# Patient Record
Sex: MALE | Race: BLACK OR AFRICAN AMERICAN | Employment: OTHER | URBAN - METROPOLITAN AREA
[De-identification: names, ages, dates, MRNs, and addresses within clinical notes are randomized per-mention and may not be internally consistent; named-entity substitution may affect disease eponyms.]

---

## 2018-03-27 ENCOUNTER — OFFICE VISIT (OUTPATIENT)
Dept: FAMILY MEDICINE CLINIC | Age: 73
End: 2018-03-27

## 2018-03-27 VITALS
WEIGHT: 157 LBS | SYSTOLIC BLOOD PRESSURE: 160 MMHG | BODY MASS INDEX: 25.23 KG/M2 | OXYGEN SATURATION: 98 % | TEMPERATURE: 98.2 F | RESPIRATION RATE: 16 BRPM | HEART RATE: 82 BPM | HEIGHT: 66 IN | DIASTOLIC BLOOD PRESSURE: 98 MMHG

## 2018-03-27 DIAGNOSIS — Z72.0 TOBACCO USE: ICD-10-CM

## 2018-03-27 DIAGNOSIS — I10 ESSENTIAL HYPERTENSION: Primary | ICD-10-CM

## 2018-03-27 DIAGNOSIS — M10.9 GOUT, UNSPECIFIED CAUSE, UNSPECIFIED CHRONICITY, UNSPECIFIED SITE: ICD-10-CM

## 2018-03-27 DIAGNOSIS — E03.9 ACQUIRED HYPOTHYROIDISM: ICD-10-CM

## 2018-03-27 DIAGNOSIS — Z11.59 ENCOUNTER FOR HEPATITIS C SCREENING TEST FOR LOW RISK PATIENT: ICD-10-CM

## 2018-03-27 RX ORDER — LISINOPRIL 10 MG/1
TABLET ORAL DAILY
COMMUNITY
End: 2019-01-22 | Stop reason: SDUPTHER

## 2018-03-27 RX ORDER — LEVOTHYROXINE SODIUM 125 UG/1
TABLET ORAL
COMMUNITY
End: 2018-04-17 | Stop reason: DRUGHIGH

## 2018-03-27 NOTE — PROGRESS NOTES
Shanita Jackson, 67 y.o.,  male    SUBJECTIVE  Establish care, switching PCP    HTN- long standing h/o, taking lisinopril, says prev PCP recently decreased lisinopril dose    He is a chronic smoker, used to be 2-3 ppd in his younger years, now about 5 cig/ day    H/o hypothyroidism, on lt4 replacement for years    H/o gout big toe flares abouts 2 times a year, takes nsaids with relief, does not know trigger. Remote h/o throat ca s/p radiation/chemotherapy 2003    Remote h/o etoh induced pancreatitis, medically managed. ROS:  See HPI, all others negative        Patient Active Problem List   Diagnosis Code    Essential hypertension I10    Acquired hypothyroidism E03.9    Tobacco use Z72.0       Current Outpatient Prescriptions   Medication Sig Dispense Refill    lisinopril (PRINIVIL, ZESTRIL) 10 mg tablet Take  by mouth daily.  levothyroxine (SYNTHROID) 125 mcg tablet Take  by mouth Daily (before breakfast). No Known Allergies    Past Medical History:   Diagnosis Date    Cancer ([facility] [address] 2012    throat    Gout     Hypertension     Pancreatitis 1989       Social History     Social History    Marital status:      Spouse name: N/A    Number of children: N/A    Years of education: N/A     Occupational History    Not on file.      Social History Main Topics    Smoking status: Current Every Day Smoker     Packs/day: 0.25     Years: 50.00    Smokeless tobacco: Never Used    Alcohol use Yes      Comment: 3    Drug use: No    Sexual activity: Yes     Partners: Female     Other Topics Concern    Not on file     Social History Narrative    No narrative on file       Family History   Problem Relation Age of Onset    Cancer Father          OBJECTIVE    Physical Exam:     Visit Vitals    BP (!) 160/98 (BP 1 Location: Left arm, BP Patient Position: Sitting)    Pulse 82    Temp 98.2 °F (36.8 °C) (Oral)    Resp 16    Ht 5' 6\" (1.676 m)    Wt 157 lb (71.2 kg)    SpO2 98%    BMI 25.34 kg/m2       General: alert, AA, in no apparent distress or pain  Head: atraumatic. Non-tender maxillary and frontal sinuses  Eyes: Lids with no discharge, no matting, conjunctivae clear and non injected, full EOMs, PERLLA  Ears: pinna non-tender, external auditory canal patent, TM intact  Mouth/throat:tonsils non enlarged, pharynx non erythematous and no lesion, nasal mucosa normal  Neck: supple, no adenopathy palpated  CVS: normal rate, regular rhythm, distinct S1 and S2  Lungs:clear to ausculation bilaterally, no crackles, wheezing or rhonchi noted  Abdomen: normoactive bowel sounds, soft, non-tender  Extremities: no edema, no cyanosis,  Skin: warm, no lesions, rashes noted  Psych:  mood and affect normal        ASSESSMENT/PLAN  Diagnoses and all orders for this visit:    1. Essential hypertension  Elevated today, on low dose lisinopril   For his age goal <667/06  -     METABOLIC PANEL, COMPREHENSIVE; Future  -     CBC WITH AUTOMATED DIFF; Future  -     LIPID PANEL; Future    2. Acquired hypothyroidism  On replacement   check-     TSH 3RD GENERATION; Future    3. Gout, unspecified cause, unspecified chronicity, unspecified site  2x a year flares  Check:  -     URIC ACID; Future    4. Tobacco use  Counseled on cessation  Plan to offer AAA screen/ LDCT lung screen if not done  Request records for vaccines    5. Encounter for hepatitis C screening test for low risk patient  -     HEPATITIS C AB; Future    Follow-up Disposition:  Return in about 3 weeks (around 4/17/2018), or if symptoms worsen or fail to improve. Patient understands plan of care. Patient has provided input and agrees with goals.

## 2018-03-27 NOTE — PATIENT INSTRUCTIONS

## 2018-03-27 NOTE — MR AVS SNAPSHOT
[facility] [address] [facility] 
[phone] Patient: Taco Allen MRN: [id number] :1945 Visit Information Date & Time Provider Department Dept. Phone Encounter #  
 3/27/2018  2:30 PM Bean Desai, [address] [place] [address] Road [id number] Follow-up Instructions Return in about 3 weeks (around 2018), or if symptoms worsen or fail to improve. Upcoming Health Maintenance Date Due Hepatitis C Screening 1945 DTaP/Tdap/Td series (1 - Tdap) 1966 FOBT Q 1 YEAR AGE 50-75 1995 ZOSTER VACCINE AGE 60> 2005 GLAUCOMA SCREENING Q2Y 2010 Pneumococcal 65+ Low/Medium Risk (1 of 2 - PCV13) 2010 Influenza Age 5 to Adult 2017 Allergies as of 3/27/2018  Review Complete On: 3/27/2018 By: Bean Desai MD  
 No Known Allergies Current Immunizations  Never Reviewed No immunizations on file. Not reviewed this visit You Were Diagnosed With   
  
 Codes Comments Essential hypertension    -  Primary ICD-10-CM: I10 
ICD-9-CM: 401.9 Acquired hypothyroidism     ICD-10-CM: E03.9 ICD-9-CM: 244.9 Gout, unspecified cause, unspecified chronicity, unspecified site     ICD-10-CM: M10.9 ICD-9-CM: 274.9 Tobacco use     ICD-10-CM: Z72.0 ICD-9-CM: 305.1 Encounter for hepatitis C screening test for low risk patient     ICD-10-CM: Z11.59 
ICD-9-CM: V73.89 Vitals BP Pulse Temp Resp Height(growth percentile) Weight(growth percentile) (!) 160/98 (BP 1 Location: Left arm, BP Patient Position: Sitting) 82 98.2 °F (36.8 °C) (Oral) 16 5' 6\" (1.676 m) 157 lb (71.2 kg) SpO2 BMI Smoking Status 98% 25.34 kg/m2 Current Every Day Smoker Vitals History BMI and BSA Data Body Mass Index Body Surface Area  
 25.34 kg/m 2 1.82 m 2 Preferred Pharmacy Pharmacy Name Phone [address], [address] [facility] Your Updated Medication List  
  
   
This list is accurate as of 3/27/18  3:34 PM.  Always use your most recent med list.  
  
  
  
  
 levothyroxine 125 mcg tablet Commonly known as:  SYNTHROID Take  by mouth Daily (before breakfast). lisinopril 10 mg tablet Commonly known as:  Sae Art Take  by mouth daily. Follow-up Instructions Return in about 3 weeks (around 4/17/2018), or if symptoms worsen or fail to improve. To-Do List   
 03/27/2018 Lab:  CBC WITH AUTOMATED DIFF   
  
 03/27/2018 Lab:  HEPATITIS C AB   
  
 03/27/2018 Lab:  LIPID PANEL   
  
 03/27/2018 Lab:  METABOLIC PANEL, COMPREHENSIVE   
  
 03/27/2018 Lab:  TSH 3RD GENERATION   
  
 03/27/2018 Lab:  URIC ACID Patient Instructions DASH Diet: Care Instructions Your Care Instructions The DASH diet is an eating plan that can help lower your blood pressure. DASH stands for Dietary Approaches to Stop Hypertension. Hypertension is high blood pressure. The DASH diet focuses on eating foods that are high in calcium, potassium, and magnesium. These nutrients can lower blood pressure. The foods that are highest in these nutrients are fruits, vegetables, low-fat dairy products, nuts, seeds, and legumes. But taking calcium, potassium, and magnesium supplements instead of eating foods that are high in those nutrients does not have the same effect. The DASH diet also includes whole grains, fish, and poultry. The DASH diet is one of several lifestyle changes your doctor may recommend to lower your high blood pressure. Your doctor may also want you to decrease the amount of sodium in your diet. Lowering sodium while following the DASH diet can lower blood pressure even further than just the DASH diet alone. Follow-up care is a key part of your treatment and safety.  Be sure to make and go to all appointments, and call your doctor if you are having problems. It's also a good idea to know your test results and keep a list of the medicines you take. How can you care for yourself at home? Following the DASH diet · Eat 4 to 5 servings of fruit each day. A serving is 1 medium-sized piece of fruit, ½ cup chopped or canned fruit, 1/4 cup dried fruit, or 4 ounces (½ cup) of fruit juice. Choose fruit more often than fruit juice. · Eat 4 to 5 servings of vegetables each day. A serving is 1 cup of lettuce or raw leafy vegetables, ½ cup of chopped or cooked vegetables, or 4 ounces (½ cup) of vegetable juice. Choose vegetables more often than vegetable juice. · Get 2 to 3 servings of low-fat and fat-free dairy each day. A serving is 8 ounces of milk, 1 cup of yogurt, or 1 ½ ounces of cheese. · Eat 6 to 8 servings of grains each day. A serving is 1 slice of bread, 1 ounce of dry cereal, or ½ cup of cooked rice, pasta, or cooked cereal. Try to choose whole-grain products as much as possible. · Limit lean meat, poultry, and fish to 2 servings each day. A serving is 3 ounces, about the size of a deck of cards. · Eat 4 to 5 servings of nuts, seeds, and legumes (cooked dried beans, lentils, and split peas) each week. A serving is 1/3 cup of nuts, 2 tablespoons of seeds, or ½ cup of cooked beans or peas. · Limit fats and oils to 2 to 3 servings each day. A serving is 1 teaspoon of vegetable oil or 2 tablespoons of salad dressing. · Limit sweets and added sugars to 5 servings or less a week. A serving is 1 tablespoon jelly or jam, ½ cup sorbet, or 1 cup of lemonade. · Eat less than 2,300 milligrams (mg) of sodium a day. If you limit your sodium to 1,500 mg a day, you can lower your blood pressure even more. Tips for success · Start small. Do not try to make dramatic changes to your diet all at once.  You might feel that you are missing out on your favorite foods and then be more likely to not follow the plan. Make small changes, and stick with them. Once those changes become habit, add a few more changes. · Try some of the following: ¨ Make it a goal to eat a fruit or vegetable at every meal and at snacks. This will make it easy to get the recommended amount of fruits and vegetables each day. ¨ Try yogurt topped with fruit and nuts for a snack or healthy dessert. ¨ Add lettuce, tomato, cucumber, and onion to sandwiches. ¨ Combine a ready-made pizza crust with low-fat mozzarella cheese and lots of vegetable toppings. Try using tomatoes, squash, spinach, broccoli, carrots, cauliflower, and onions. ¨ Have a variety of cut-up vegetables with a low-fat dip as an appetizer instead of chips and dip. ¨ Sprinkle sunflower seeds or chopped almonds over salads. Or try adding chopped walnuts or almonds to cooked vegetables. ¨ Try some vegetarian meals using beans and peas. Add garbanzo or kidney beans to salads. Make burritos and tacos with mashed rodriguez beans or black beans. Where can you learn more? Go to http://mike-marcos.info/. Enter Y180 in the search box to learn more about \"DASH Diet: Care Instructions. \" Current as of: September 21, 2016 Content Version: 11.4 © 9488-2433 [employer]. Care instructions adapted under license by [employer] (which disclaims liability or warranty for this information). If you have questions about a medical condition or this instruction, always ask your healthcare professional. [place] [address] any warranty or liability for your use of this information. Introducing [facility] [place] [facility]! [facility] introduces [employer] patient portal. Now you can access parts of your medical record, email your doctor's office, and request medication refills online. 1. In your internet browser, go to [email or web address] 2. Click on the First Time User? Click Here link in the Sign In box. You will see the New Member Sign Up page. 3. Enter your [employer] Access Code exactly as it appears below. You will not need to use this code after youve completed the sign-up process. If you do not sign up before the expiration date, you must request a new code. · [employer] Access Code: [email or web address] Expires: 6/25/2018  3:34 PM 
 
4. Enter the last four digits of your Social Security Number (xxxx) and Date of Birth (mm/dd/yyyy) as indicated and click Submit. You will be taken to the next sign-up page. 5. Create a CloudMade ID. This will be your CloudMade login ID and cannot be changed, so think of one that is secure and easy to remember. 6. Create a Cloud[employer] password. You can change your password at any time. 7. Enter your Password Reset Question and Answer. This can be used at a later time if you forget your password. 8. Enter your e-mail address. You will receive e-mail notification when new information is available in [address] 9. Click Sign Up. You can now view and download portions of your medical record. 10. Click the Download Summary menu link to download a portable copy of your medical information. If you have questions, please visit the Frequently Asked Questions section of the [employer] website. Remember, [employer] is NOT to be used for urgent needs. For medical emergencies, dial 911. Now available from your iPhone and Android! Please provide this summary of care documentation to your next provider. If you have any questions after today's visit, please call [phone].

## 2018-03-27 NOTE — PROGRESS NOTES
1. Have you been to the ER, urgent care clinic since your last visit? Hospitalized since your last visit? No    2. Have you seen or consulted any other health care providers outside of the [address] [place] [address] since your last visit? Include any pap smears or colon screening.  No

## 2018-04-09 ENCOUNTER — HOSPITAL ENCOUNTER (OUTPATIENT)
Dept: LAB | Age: 73
Discharge: HOME | End: 2018-04-09
Payer: MEDICARE

## 2018-04-09 LAB
ALBUMIN SERPL-MCNC: 3.3 G/DL (ref 3.4–5)
ALBUMIN/GLOB SERPL: 0.9 {RATIO} (ref 0.8–1.7)
ALP SERPL-CCNC: 105 U/L (ref 45–117)
ALT SERPL-CCNC: 16 U/L (ref 16–61)
ANION GAP SERPL CALC-SCNC: 7 MMOL/L (ref 3–18)
AST SERPL-CCNC: 16 U/L (ref 15–37)
BASOPHILS # BLD: 0 K/UL (ref 0–0.06)
BASOPHILS NFR BLD: 0 % (ref 0–2)
BILIRUB SERPL-MCNC: 0.4 MG/DL (ref 0.2–1)
BUN SERPL-MCNC: 15 MG/DL (ref 7–18)
BUN/CREAT SERPL: 10 (ref 12–20)
CALCIUM SERPL-MCNC: 8.9 MG/DL (ref 8.5–10.1)
CHLORIDE SERPL-SCNC: 106 MMOL/L (ref 100–108)
CHOLEST SERPL-MCNC: 184 MG/DL
CO2 SERPL-SCNC: 28 MMOL/L (ref 21–32)
CREAT SERPL-MCNC: 1.48 MG/DL (ref 0.6–1.3)
DIFFERENTIAL METHOD BLD: ABNORMAL
EOSINOPHIL # BLD: 0.4 K/UL (ref 0–0.4)
EOSINOPHIL NFR BLD: 8 % (ref 0–5)
ERYTHROCYTE [DISTWIDTH] IN BLOOD BY AUTOMATED COUNT: 13.5 % (ref 11.6–14.5)
GLOBULIN SER CALC-MCNC: 3.7 G/DL (ref 2–4)
GLUCOSE SERPL-MCNC: 99 MG/DL (ref 74–99)
HCT VFR BLD AUTO: 47 % (ref 36–48)
HCV AB SER IA-ACNC: 0.05 INDEX
HCV AB SERPL QL IA: NEGATIVE
HCV COMMENT,HCGAC: NORMAL
HDLC SERPL-MCNC: 63 MG/DL (ref 40–60)
HDLC SERPL: 2.9 {RATIO} (ref 0–5)
HGB BLD-MCNC: 16 G/DL (ref 13–16)
LDLC SERPL CALC-MCNC: 101.4 MG/DL (ref 0–100)
LIPID PROFILE,FLP: ABNORMAL
LYMPHOCYTES # BLD: 2 K/UL (ref 0.9–3.6)
LYMPHOCYTES NFR BLD: 40 % (ref 21–52)
MCH RBC QN AUTO: 29.6 PG (ref 24–34)
MCHC RBC AUTO-ENTMCNC: 34 G/DL (ref 31–37)
MCV RBC AUTO: 86.9 FL (ref 74–97)
MONOCYTES # BLD: 0.5 K/UL (ref 0.05–1.2)
MONOCYTES NFR BLD: 10 % (ref 3–10)
NEUTS SEG # BLD: 2 K/UL (ref 1.8–8)
NEUTS SEG NFR BLD: 42 % (ref 40–73)
PLATELET # BLD AUTO: 267 K/UL (ref 135–420)
PMV BLD AUTO: 10.3 FL (ref 9.2–11.8)
POTASSIUM SERPL-SCNC: 4.5 MMOL/L (ref 3.5–5.5)
PROT SERPL-MCNC: 7 G/DL (ref 6.4–8.2)
RBC # BLD AUTO: 5.41 M/UL (ref 4.7–5.5)
SODIUM SERPL-SCNC: 141 MMOL/L (ref 136–145)
TRIGL SERPL-MCNC: 98 MG/DL (ref ?–150)
TSH SERPL DL<=0.05 MIU/L-ACNC: 6.37 UIU/ML (ref 0.36–3.74)
URATE SERPL-MCNC: 6.7 MG/DL (ref 2.6–7.2)
VLDLC SERPL CALC-MCNC: 19.6 MG/DL
WBC # BLD AUTO: 5 K/UL (ref 4.6–13.2)

## 2018-04-09 PROCEDURE — 84550 ASSAY OF BLOOD/URIC ACID: CPT | Performed by: FAMILY MEDICINE

## 2018-04-09 PROCEDURE — 36415 COLL VENOUS BLD VENIPUNCTURE: CPT | Performed by: FAMILY MEDICINE

## 2018-04-09 PROCEDURE — 84443 ASSAY THYROID STIM HORMONE: CPT | Performed by: FAMILY MEDICINE

## 2018-04-09 PROCEDURE — 86803 HEPATITIS C AB TEST: CPT | Performed by: FAMILY MEDICINE

## 2018-04-09 PROCEDURE — 80061 LIPID PANEL: CPT | Performed by: FAMILY MEDICINE

## 2018-04-09 PROCEDURE — 85025 COMPLETE CBC W/AUTO DIFF WBC: CPT | Performed by: FAMILY MEDICINE

## 2018-04-09 PROCEDURE — 80053 COMPREHEN METABOLIC PANEL: CPT | Performed by: FAMILY MEDICINE

## 2018-04-17 ENCOUNTER — OFFICE VISIT (OUTPATIENT)
Dept: FAMILY MEDICINE CLINIC | Age: 73
End: 2018-04-17

## 2018-04-17 VITALS
RESPIRATION RATE: 16 BRPM | WEIGHT: 159 LBS | TEMPERATURE: 97.9 F | HEIGHT: 66 IN | SYSTOLIC BLOOD PRESSURE: 136 MMHG | DIASTOLIC BLOOD PRESSURE: 82 MMHG | BODY MASS INDEX: 25.55 KG/M2 | HEART RATE: 54 BPM | OXYGEN SATURATION: 98 %

## 2018-04-17 DIAGNOSIS — I10 ESSENTIAL HYPERTENSION: Primary | ICD-10-CM

## 2018-04-17 DIAGNOSIS — Z13.6 ENCOUNTER FOR ABDOMINAL AORTIC ANEURYSM (AAA) SCREENING: ICD-10-CM

## 2018-04-17 DIAGNOSIS — E03.9 ACQUIRED HYPOTHYROIDISM: ICD-10-CM

## 2018-04-17 DIAGNOSIS — Z72.0 TOBACCO USE: ICD-10-CM

## 2018-04-17 DIAGNOSIS — N18.30 CKD (CHRONIC KIDNEY DISEASE) STAGE 3, GFR 30-59 ML/MIN (HCC): ICD-10-CM

## 2018-04-17 DIAGNOSIS — Z71.89 ADVANCE CARE PLANNING: ICD-10-CM

## 2018-04-17 DIAGNOSIS — Z13.5 GLAUCOMA SCREENING: ICD-10-CM

## 2018-04-17 PROBLEM — E89.0 POSTABLATIVE HYPOTHYROIDISM: Status: RESOLVED | Noted: 2018-04-17 | Resolved: 2018-04-17

## 2018-04-17 PROBLEM — E89.0 POSTABLATIVE HYPOTHYROIDISM: Status: ACTIVE | Noted: 2018-04-17

## 2018-04-17 RX ORDER — LOVASTATIN 20 MG/1
40 TABLET ORAL
Qty: 180 TAB | Refills: 0 | Status: SHIPPED | OUTPATIENT
Start: 2018-04-17 | End: 2019-01-22 | Stop reason: SDUPTHER

## 2018-04-17 RX ORDER — LEVOTHYROXINE SODIUM 137 UG/1
137 TABLET ORAL
Qty: 60 TAB | Refills: 0 | Status: SHIPPED | OUTPATIENT
Start: 2018-04-17 | End: 2018-06-05 | Stop reason: DRUGHIGH

## 2018-04-17 NOTE — PROGRESS NOTES
1. Have you been to the ER, urgent care clinic since your last visit? Hospitalized since your last visit? No    2. Have you seen or consulted any other health care providers outside of the [address] [place] [address] since your last visit? Include any pap smears or colon screening.  No

## 2018-04-17 NOTE — ACP (ADVANCE CARE PLANNING)
Advance Care Planning (ACP) Provider Conversation Snapshot    Date of ACP Conversation: 04/17/18  Persons included in Conversation:  patient  Length of ACP Conversation in minutes:  16 minutes    Authorized Decision Maker (if patient is incapable of making informed decisions):    This person is:   Has yet to decide        For Patients with Decision Making Capacity:   Values/Goals: Exploration of values, goals, and preferences if recovery is not expected, even with continued medical treatment in the event of:  Imminent death  Severe, permanent brain injury    Conversation Outcomes / Follow-Up Plan:   Recommended completion of Advance Directive form after review of ACP materials and conversation with prospective healthcare agent   Recommended communicating the plan and making copies for the healthcare agent, personal physician, and others as appropriate (e.g., health system)  Recommended review of completed ACP document annually or upon change in health status

## 2018-04-17 NOTE — MR AVS SNAPSHOT
[address] [address] [facility] 
[phone] Patient: Cami Gonzalez MRN: [id number] :1945 Visit Information Date & Time Provider Department Dept. Phone Encounter #  
 2018 11:30 AM Megan Shin, [address] [place] [address] [id number] Follow-up Instructions Return in about 3 months (around 2018), or if symptoms worsen or fail to improve. Upcoming Health Maintenance Date Due DTaP/Tdap/Td series (1 - Tdap) 1966 FOBT Q 1 YEAR AGE 50-75 1995 ZOSTER VACCINE AGE 60> 2005 GLAUCOMA SCREENING Q2Y 2010 Influenza Age 5 to Adult 2017 MEDICARE YEARLY EXAM 3/27/2018 Allergies as of 2018  Review Complete On: 2018 By: Megan Shin MD  
 No Known Allergies Current Immunizations  Reviewed on 2018 Name Date Pneumococcal Conjugate (PCV-13) 10/21/2016 Pneumococcal Polysaccharide (PPSV-23) 2014 Not reviewed this visit You Were Diagnosed With   
  
 Codes Comments Essential hypertension    -  Primary ICD-10-CM: I10 
ICD-9-CM: 401.9 CKD (chronic kidney disease) stage 3, GFR 30-59 ml/min     ICD-10-CM: N18.3 ICD-9-CM: 342. 3 Acquired hypothyroidism     ICD-10-CM: E03.9 ICD-9-CM: 244.9 Tobacco use     ICD-10-CM: Z72.0 ICD-9-CM: 305.1 Encounter for abdominal aortic aneurysm (AAA) screening     ICD-10-CM: Z13.6 ICD-9-CM: V81.2 Glaucoma screening     ICD-10-CM: Z13.5 ICD-9-CM: V80.1 Vitals BP Pulse Temp Resp Height(growth percentile) Weight(growth percentile) 136/82 (BP 1 Location: Left arm, BP Patient Position: Sitting) (!) 54 97.9 °F (36.6 °C) (Oral) 16 5' 6\" (1.676 m) 159 lb (72.1 kg) SpO2 BMI Smoking Status 98% 25.66 kg/m2 Current Every Day Smoker Vitals History BMI and BSA Data  Body Mass Index Body Surface Area  
 25.66 kg/m 2 1.83 m 2  
 Preferred Pharmacy Pharmacy Name Phone [address], [address] [facility] Your Updated Medication List  
  
   
This list is accurate as of 4/17/18 12:21 PM.  Always use your most recent med list.  
  
  
  
  
 levothyroxine 137 mcg tablet Commonly known as:  SYNTHROID Take 137 mcg by mouth Daily (before breakfast). lisinopril 10 mg tablet Commonly known as:  Mechele Livings Take  by mouth daily. lovastatin 20 mg tablet Commonly known as:  MEVACOR Take 2 Tabs by mouth nightly. Prescriptions Sent to Pharmacy Refills  
 levothyroxine (SYNTHROID) 137 mcg tablet 0 Sig: Take 137 mcg by mouth Daily (before breakfast). Class: Normal  
 [facility] [address] [place] Ph #: [phone] Route: Oral  
 lovastatin (MEVACOR) 20 mg tablet 0 Sig: Take 2 Tabs by mouth nightly. Class: Normal  
 [facility] [address] Ph #: [phone] Route: Oral  
  
We Performed the Following REFERRAL TO OPHTHALMOLOGY [REF57 Custom] Follow-up Instructions Return in about 3 months (around 7/17/2018), or if symptoms worsen or fail to improve. To-Do List   
 04/17/2018 Lab:  LIPID PANEL   
  
 04/17/2018 Lab:  METABOLIC PANEL, COMPREHENSIVE   
  
 04/17/2018 Lab:  TSH 3RD GENERATION   
  
 04/17/2018 Imaging:  US EXAM SCREENING AAA Referral Information Referral ID Referred By Referred To  
  
 [id number] Elsi Swartz MD   
   [address] [place] [address] Phone: [phone] Fax: [phone] Visits Status Start Date End Date 1 New Request 4/17/18 4/17/19 If your referral has a status of pending review or denied, additional information will be sent to support the outcome of this decision. Patient Instructions The best way to stay healthy is to live a healthy lifestyle. A healthy lifestyle includes regular exercise, eating a well-balanced diet, keeping a healthy weight and not smoking. Regular physical exams and screening tests are another important way to take care of yourself. Preventive exams provided by health care providers can find health problems early when treatment works best and can keep you from getting certain diseases or illnesses. Preventive services include exams, lab tests, screenings, shots, monitoring and information to help you take care of your own health. All people over 65 should have a pneumonia shot. Pneumonia shots are usually only needed once in a lifetime unless your doctor decides differently. In addition to your physical exam, some screening tests are recommended: 
 
All people over 65 should have a yearly flu shot. People over 65 are at medium to high risk for Hepatitis B. Three shots are needed for complete protection. Bone mass measurement (dexa scan) is recommended every two years. Diabetes Mellitus screening is recommended every year. Glaucoma is an eye disease caused by high pressure in the eye. An eye exam is recommended every year. Cardiovascular screening tests that check your cholesterol and other blood fat (lipid) levels are recommended every five years. Colorectal Cancer screening tests help to find pre-cancerous polyps (growths in the colon) so they can be removed before they turn into cancer. Tests ordered for screening depend on your personal and family history risk factors. Prostate Cancer Screening (annually up to age 76) Screening for breast cancer is recommended yearly with a Mammogram. 
 
Screening for cervical and vaginal cancer is recommended with a pelvic and Pap test every two years.  However if you have had an abnormal pap in the past  three years or at high risk for cervical or vaginal cancer Medicare will cover a pap test and a pelvic exam every year. Here is a list of your current Health Maintenance items with a due date: 
Health Maintenance Due Topic Date Due  
 DTaP/Tdap/Td  (1 - Tdap) 11/08/1966  Stool testing for trace blood  11/08/1995  Shingles Vaccine  09/08/2005  Glaucoma Screening   11/08/2010  Flu Vaccine  08/01/2017 Lisa Onofre Annual Well Visit  03/27/2018 Introducing [facility] [place] [facility]! [facility] introduces [employer] patient portal. Now you can access parts of your medical record, email your doctor's office, and request medication refills online. 1. In your internet browser, go to [email or web address] 2. Click on the First Time User? Click Here link in the Sign In box. You will see the New Member Sign Up page. 3. Enter your [employer] Access Code exactly as it appears below. You will not need to use this code after youve completed the sign-up process. If you do not sign up before the expiration date, you must request a new code. · [employer] Access Code: [email or web address] Expires: 6/25/2018  3:34 PM 
 
4. Enter the last four digits of your Social Security Number (xxxx) and Date of Birth (mm/dd/yyyy) as indicated and click Submit. You will be taken to the next sign-up page. 5. Create a [employer] ID. This will be your [employer] login ID and cannot be changed, so think of one that is secure and easy to remember. 6. Create a [employer] password. You can change your password at any time. 7. Enter your Password Reset Question and Answer. This can be used at a later time if you forget your password. 8. Enter your e-mail address. You will receive e-mail notification when new information is available in [address] 9. Click Sign Up. You can now view and download portions of your medical record. 10. Click the Download Summary menu link to download a portable copy of your medical information.  
 
If you have questions, please visit the Frequently Asked Questions section of the [employer]. Remember, [employer] is NOT to be used for urgent needs. For medical emergencies, dial 911. Now available from your iPhone and Android! Please provide this summary of care documentation to your next provider. Your primary care clinician is listed as Daniel Traore. If you have any questions after today's visit, please call [phone].

## 2018-04-17 NOTE — PROGRESS NOTES
This is the Subsequent Medicare Annual Wellness Exam, performed 12 months or more after the Initial AWV or the last Subsequent AWV    I have reviewed the patient's medical history in detail and updated the computerized patient record. History     Past Medical History:   Diagnosis Date    Cancer [facility]) 2012    throat    Gout     Hypertension     Pancreatitis 1989      Past Surgical History:   Procedure Laterality Date    ABDOMEN SURGERY PROC UNLISTED       Current Outpatient Prescriptions   Medication Sig Dispense Refill    levothyroxine (SYNTHROID) 137 mcg tablet Take 137 mcg by mouth Daily (before breakfast). 60 Tab 0    lovastatin (MEVACOR) 20 mg tablet Take 2 Tabs by mouth nightly. 180 Tab 0    lisinopril (PRINIVIL, ZESTRIL) 10 mg tablet Take  by mouth daily. No Known Allergies  Family History   Problem Relation Age of Onset    Cancer Father      Social History   Substance Use Topics    Smoking status: Current Every Day Smoker     Packs/day: 0.25     Years: 50.00    Smokeless tobacco: Never Used    Alcohol use Yes      Comment: 3     Patient Active Problem List   Diagnosis Code    Essential hypertension I10    Acquired hypothyroidism E03.9    Tobacco use Z72.0    CKD (chronic kidney disease) stage 3, GFR 30-59 ml/min N18.3       Depression Risk Factor Screening:     PHQ over the last two weeks 3/27/2018   Little interest or pleasure in doing things Not at all   Feeling down, depressed or hopeless Not at all   Total Score PHQ 2 0     Alcohol Risk Factor Screening: You do not drink alcohol or very rarely. Functional Ability and Level of Safety:   Hearing Loss  Hearing is good. Activities of Daily Living  The home contains: no safety equipment. Patient does total self care    Fall Risk  Fall Risk Assessment, last 12 mths 3/27/2018   Able to walk? Yes   Fall in past 12 months?  No       Abuse Screen  Patient is not abused    Cognitive Screening   Evaluation of Cognitive Function:  Has your family/caregiver stated any concerns about your memory: no      Patient Care Team   Patient Care Team:  Yenny Roger MD as PCP - General (Family Practice)    Assessment/Plan   Education and counseling provided:  Are appropriate based on today's review and evaluation  End-of-Life planning (with patient's consent)-discussed, provided form  Pneumococcal Vaccine- completed 13/23  Influenza Vaccine- annually  Cardiovascular screening blood test- utd  Screening for glaucoma- due, referral order placed  AAA screen- order placed  Colon ca screen- [place] notes requested    Iris Yanez, 67 y.o.,  male    SUBJECTIVE  Ff-up    HTN- long standing h/o, taking lisinopril. Reviewed labs, pt reports known CKD    He is a chronic smoker, used to be 2-3 ppd in his younger years, now about 5 cig/ day    H/o hypothyroidism, on lt4 replacement for years. Reviewed TSH elevated, pt reports med compliance    H/o gout big toe flares abouts 2 times a year, takes nsaids with relief, does not know trigger. Uric acid level is  normal    Remote h/o throat ca s/p radiation/chemotherapy 2003    ROS:  See HPI, all others negative        Patient Active Problem List   Diagnosis Code    Essential hypertension I10    Acquired hypothyroidism E03.9    Tobacco use Z72.0    CKD (chronic kidney disease) stage 3, GFR 30-59 ml/min N18.3       Current Outpatient Prescriptions   Medication Sig Dispense Refill    levothyroxine (SYNTHROID) 137 mcg tablet Take 137 mcg by mouth Daily (before breakfast). 60 Tab 0    lovastatin (MEVACOR) 20 mg tablet Take 2 Tabs by mouth nightly. 180 Tab 0    lisinopril (PRINIVIL, ZESTRIL) 10 mg tablet Take  by mouth daily.          No Known Allergies    Past Medical History:   Diagnosis Date    Cancer ([facility] [address] 2012    throat    Gout     Hypertension     Pancreatitis 1989       Social History     Social History    Marital status:      Spouse name: N/A    Number of children: N/A    Years of education: N/A     Occupational History    Not on file. Social History Main Topics    Smoking status: Current Every Day Smoker     Packs/day: 0.25     Years: 50.00    Smokeless tobacco: Never Used    Alcohol use Yes      Comment: 3    Drug use: No    Sexual activity: Yes     Partners: Female     Other Topics Concern    Not on file     Social History Narrative       Family History   Problem Relation Age of Onset    Cancer Father          OBJECTIVE    Physical Exam:     Visit Vitals    /82 (BP 1 Location: Left arm, BP Patient Position: Sitting)    Pulse (!) 54    Temp 97.9 °F (36.6 °C) (Oral)    Resp 16    Ht 5' 6\" (1.676 m)    Wt 159 lb (72.1 kg)    SpO2 98%    BMI 25.66 kg/m2       General: alert, AA, in no apparent distress or pain  Neck: supple, no adenopathy palpated  CVS: normal rate, regular rhythm, distinct S1 and S2  Lungs:clear to ausculation bilaterally, no crackles, wheezing or rhonchi noted  Extremities: no edema, no cyanosis,  Skin: warm, no lesions, rashes noted  Psych:  mood and affect normal    Results for orders placed or performed during the hospital encounter of 04/09/18   CBC WITH AUTOMATED DIFF   Result Value Ref Range    WBC 5.0 4.6 - 13.2 K/uL    RBC 5.41 4.70 - 5.50 M/uL    HGB 16.0 13.0 - 16.0 g/dL    HCT 47.0 36.0 - 48.0 %    MCV 86.9 74.0 - 97.0 FL    MCH 29.6 24.0 - 34.0 PG    MCHC 34.0 31.0 - 37.0 g/dL    RDW 13.5 11.6 - 14.5 %    PLATELET 875 774 - 004 K/uL    MPV 10.3 9.2 - 11.8 FL    NEUTROPHILS 42 40 - 73 %    LYMPHOCYTES 40 21 - 52 %    MONOCYTES 10 3 - 10 %    EOSINOPHILS 8 (H) 0 - 5 %    BASOPHILS 0 0 - 2 %    ABS. NEUTROPHILS 2.0 1.8 - 8.0 K/UL    ABS. LYMPHOCYTES 2.0 0.9 - 3.6 K/UL    ABS. MONOCYTES 0.5 0.05 - 1.2 K/UL    ABS. EOSINOPHILS 0.4 0.0 - 0.4 K/UL    ABS. BASOPHILS 0.0 0.0 - 0.06 K/UL    DF AUTOMATED     HEPATITIS C AB   Result Value Ref Range    Hepatitis C virus Ab 0.05 <0.80 Index    Hep C  virus Ab Interp.  NEGATIVE  NEG      Hep C  virus Ab comment         LIPID PANEL   Result Value Ref Range    LIPID PROFILE          Cholesterol, total 184 <200 MG/DL    Triglyceride 98 <150 MG/DL    HDL Cholesterol 63 (H) 40 - 60 MG/DL    LDL, calculated 101.4 (H) 0 - 100 MG/DL    VLDL, calculated 19.6 MG/DL    CHOL/HDL Ratio 2.9 0 - 5.0     METABOLIC PANEL, COMPREHENSIVE   Result Value Ref Range    Sodium 141 136 - 145 mmol/L    Potassium 4.5 3.5 - 5.5 mmol/L    Chloride 106 100 - 108 mmol/L    CO2 28 21 - 32 mmol/L    Anion gap 7 3.0 - 18 mmol/L    Glucose 99 74 - 99 mg/dL    BUN 15 7.0 - 18 MG/DL    Creatinine 1.48 (H) 0.6 - 1.3 MG/DL    BUN/Creatinine ratio 10 (L) 12 - 20      GFR est AA 57 (L) >60 ml/min/1.73m2    GFR est non-AA 47 (L) >60 ml/min/1.73m2    Calcium 8.9 8.5 - 10.1 MG/DL    Bilirubin, total 0.4 0.2 - 1.0 MG/DL    ALT (SGPT) 16 16 - 61 U/L    AST (SGOT) 16 15 - 37 U/L    Alk. phosphatase 105 45 - 117 U/L    Protein, total 7.0 6.4 - 8.2 g/dL    Albumin 3.3 (L) 3.4 - 5.0 g/dL    Globulin 3.7 2.0 - 4.0 g/dL    A-G Ratio 0.9 0.8 - 1.7     TSH 3RD GENERATION   Result Value Ref Range    TSH 6.37 (H) 0.36 - 3.74 uIU/mL   URIC ACID   Result Value Ref Range    Uric acid 6.7 2.6 - 7.2 MG/DL         ASSESSMENT/PLAN  Diagnoses and all orders for this visit:    1. Essential hypertension  Controlled, cont lisinopril  Calc CV risk 30 % vs 9 % for age  Advised starting statin, pt concerned about cost  Try lovastatin 40 mg qhs  Advised smoking cessation    2. Acquired hypothyroidism  Increase synthroid to 137 mcgs/day  Recheck TSH in 6 weeks    3. Gout, unspecified cause, unspecified chronicity, unspecified site  2x a year flares  Normal uric acid level  Will monitor    4. Tobacco use  Counseled on cessation  AAA screen ordered    5. CKD stage 3  Avoid nsaids  Monitoring, cont lisinopril  Optimize cv risk reduction  Check CMP/lipid panel in 3 months      Follow-up Disposition:  Return in about 3 months (around 7/17/2018), or if symptoms worsen or fail to improve.

## 2018-04-17 NOTE — PATIENT INSTRUCTIONS
The best way to stay healthy is to live a healthy lifestyle. A healthy lifestyle includes regular exercise, eating a well-balanced diet, keeping a healthy weight and not smoking. Regular physical exams and screening tests are another important way to take care of yourself. Preventive exams provided by health care providers can find health problems early when treatment works best and can keep you from getting certain diseases or illnesses. Preventive services include exams, lab tests, screenings, shots, monitoring and information to help you take care of your own health. All people over 65 should have a pneumonia shot. Pneumonia shots are usually only needed once in a lifetime unless your doctor decides differently. In addition to your physical exam, some screening tests are recommended:    All people over 65 should have a yearly flu shot. People over 65 are at medium to high risk for Hepatitis B. Three shots are needed for complete protection. Bone mass measurement (dexa scan) is recommended every two years. Diabetes Mellitus screening is recommended every year. Glaucoma is an eye disease caused by high pressure in the eye. An eye exam is recommended every year. Cardiovascular screening tests that check your cholesterol and other blood fat (lipid) levels are recommended every five years. Colorectal Cancer screening tests help to find pre-cancerous polyps (growths in the colon) so they can be removed before they turn into cancer. Tests ordered for screening depend on your personal and family history risk factors. Prostate Cancer Screening (annually up to age 76)    Screening for breast cancer is recommended yearly with a Mammogram.    Screening for cervical and vaginal cancer is recommended with a pelvic and Pap test every two years.  However if you have had an abnormal pap in the past  three years or at high risk for cervical or vaginal cancer Medicare will cover a pap test and a pelvic exam every year.      Here is a list of your current Health Maintenance items with a due date:  Health Maintenance Due   Topic Date Due    DTaP/Tdap/Td  (1 - Tdap) 11/08/1966    Stool testing for trace blood  11/08/1995    Shingles Vaccine  09/08/2005    Glaucoma Screening   11/08/2010    Flu Vaccine  08/01/2017    Annual Well Visit  03/27/2018

## 2018-04-26 ENCOUNTER — HOSPITAL ENCOUNTER (OUTPATIENT)
Dept: ULTRASOUND IMAGING | Age: 73
Discharge: HOME | End: 2018-04-26
Attending: FAMILY MEDICINE
Payer: MEDICARE

## 2018-04-26 DIAGNOSIS — Z13.6 ENCOUNTER FOR ABDOMINAL AORTIC ANEURYSM (AAA) SCREENING: ICD-10-CM

## 2018-04-26 PROCEDURE — 76706 US ABDL AORTA SCREEN AAA: CPT

## 2018-04-26 NOTE — PROGRESS NOTES
Contacted patient and verified identity using name and date of birth (2- identifiers). Patient advised normal AAA screen. Patient voiced understanding.

## 2018-05-30 ENCOUNTER — HOSPITAL ENCOUNTER (OUTPATIENT)
Dept: LAB | Age: 73
Discharge: HOME | End: 2018-05-30
Payer: MEDICARE

## 2018-05-30 DIAGNOSIS — E03.9 ACQUIRED HYPOTHYROIDISM: ICD-10-CM

## 2018-05-30 LAB — TSH SERPL DL<=0.05 MIU/L-ACNC: 10 UIU/ML (ref 0.36–3.74)

## 2018-05-30 PROCEDURE — 36415 COLL VENOUS BLD VENIPUNCTURE: CPT | Performed by: FAMILY MEDICINE

## 2018-05-30 PROCEDURE — 84443 ASSAY THYROID STIM HORMONE: CPT | Performed by: FAMILY MEDICINE

## 2018-05-31 NOTE — PROGRESS NOTES
Did he increase synthroid dose to 137 mcgs? Has he been taking regularly on an empty stomach? Paradoxically more hypothyroid state on recheck (TSH higher)  If he has, then will increase to 150 mcgs  If not, then keep taking 137 mcgs more regularly and will recheck TSH again in 6 weeks  pls notify pt.

## 2018-06-05 DIAGNOSIS — E03.9 ACQUIRED HYPOTHYROIDISM: Primary | ICD-10-CM

## 2018-06-05 RX ORDER — LEVOTHYROXINE SODIUM 150 UG/1
150 TABLET ORAL
Qty: 60 TAB | Refills: 0 | Status: SHIPPED | OUTPATIENT
Start: 2018-06-05 | End: 2018-08-23 | Stop reason: SDUPTHER

## 2018-06-05 NOTE — PROGRESS NOTES
Patient returned call. Verified identity using name and date of birth (2- identifiers). Patient states he has increased his synthroid dose to 137 mcgs. He also states he has been taking it regularly on an empty stomach. Patient informed TSH level still high and will send message to Dr. Geno Thorne to increase to 150 mcgs. Patient voiced understanding.

## 2018-06-05 NOTE — PROGRESS NOTES
Patient identified with 2 identifiers (name and ).   Patient aware that synthroid has been increase to 150 mcg and to repeat in 6 weeks

## 2018-07-11 ENCOUNTER — HOSPITAL ENCOUNTER (OUTPATIENT)
Dept: LAB | Age: 73
Discharge: HOME | End: 2018-07-11
Payer: MEDICARE

## 2018-07-11 DIAGNOSIS — N18.30 CKD (CHRONIC KIDNEY DISEASE) STAGE 3, GFR 30-59 ML/MIN (HCC): ICD-10-CM

## 2018-07-11 DIAGNOSIS — I10 ESSENTIAL HYPERTENSION: ICD-10-CM

## 2018-07-11 DIAGNOSIS — E03.9 ACQUIRED HYPOTHYROIDISM: ICD-10-CM

## 2018-07-11 LAB
ALBUMIN SERPL-MCNC: 2.9 G/DL (ref 3.4–5)
ALBUMIN/GLOB SERPL: 0.9 {RATIO} (ref 0.8–1.7)
ALP SERPL-CCNC: 88 U/L (ref 45–117)
ALT SERPL-CCNC: 14 U/L (ref 16–61)
ANION GAP SERPL CALC-SCNC: 4 MMOL/L (ref 3–18)
AST SERPL-CCNC: 17 U/L (ref 15–37)
BILIRUB SERPL-MCNC: 0.3 MG/DL (ref 0.2–1)
BUN SERPL-MCNC: 16 MG/DL (ref 7–18)
BUN/CREAT SERPL: 11 (ref 12–20)
CALCIUM SERPL-MCNC: 8.2 MG/DL (ref 8.5–10.1)
CHLORIDE SERPL-SCNC: 111 MMOL/L (ref 100–108)
CHOLEST SERPL-MCNC: 140 MG/DL
CO2 SERPL-SCNC: 29 MMOL/L (ref 21–32)
CREAT SERPL-MCNC: 1.42 MG/DL (ref 0.6–1.3)
GLOBULIN SER CALC-MCNC: 3.1 G/DL (ref 2–4)
GLUCOSE SERPL-MCNC: 89 MG/DL (ref 74–99)
HDLC SERPL-MCNC: 59 MG/DL (ref 40–60)
HDLC SERPL: 2.4 {RATIO} (ref 0–5)
LDLC SERPL CALC-MCNC: 65.6 MG/DL (ref 0–100)
LIPID PROFILE,FLP: NORMAL
POTASSIUM SERPL-SCNC: 3.9 MMOL/L (ref 3.5–5.5)
PROT SERPL-MCNC: 6 G/DL (ref 6.4–8.2)
SODIUM SERPL-SCNC: 144 MMOL/L (ref 136–145)
TRIGL SERPL-MCNC: 77 MG/DL (ref ?–150)
TSH SERPL DL<=0.05 MIU/L-ACNC: 1.54 UIU/ML (ref 0.36–3.74)
VLDLC SERPL CALC-MCNC: 15.4 MG/DL

## 2018-07-11 PROCEDURE — 84443 ASSAY THYROID STIM HORMONE: CPT | Performed by: FAMILY MEDICINE

## 2018-07-11 PROCEDURE — 80061 LIPID PANEL: CPT | Performed by: FAMILY MEDICINE

## 2018-07-11 PROCEDURE — 80053 COMPREHEN METABOLIC PANEL: CPT | Performed by: FAMILY MEDICINE

## 2018-07-11 PROCEDURE — 36415 COLL VENOUS BLD VENIPUNCTURE: CPT | Performed by: FAMILY MEDICINE

## 2018-07-17 ENCOUNTER — OFFICE VISIT (OUTPATIENT)
Dept: FAMILY MEDICINE CLINIC | Age: 73
End: 2018-07-17

## 2018-07-17 VITALS
SYSTOLIC BLOOD PRESSURE: 130 MMHG | RESPIRATION RATE: 13 BRPM | WEIGHT: 158.2 LBS | TEMPERATURE: 98 F | BODY MASS INDEX: 25.43 KG/M2 | HEIGHT: 66 IN | HEART RATE: 54 BPM | DIASTOLIC BLOOD PRESSURE: 82 MMHG | OXYGEN SATURATION: 99 %

## 2018-07-17 DIAGNOSIS — N18.30 CKD (CHRONIC KIDNEY DISEASE) STAGE 3, GFR 30-59 ML/MIN (HCC): ICD-10-CM

## 2018-07-17 DIAGNOSIS — I10 ESSENTIAL HYPERTENSION: Primary | ICD-10-CM

## 2018-07-17 DIAGNOSIS — Z72.0 TOBACCO USE: ICD-10-CM

## 2018-07-17 DIAGNOSIS — E03.9 ACQUIRED HYPOTHYROIDISM: ICD-10-CM

## 2018-07-17 NOTE — PROGRESS NOTES
1. Have you been to the ER, urgent care clinic since your last visit? Hospitalized since your last visit? No    2. Have you seen or consulted any other health care providers outside of the [address] [place] [address] since your last visit? Include any pap smears or colon screening.  No    Chief Complaint   Patient presents with    Hypertension    Hypothyroidism    Chronic Kidney Disease

## 2018-07-17 NOTE — PROGRESS NOTES
Mohit Toscano, 67 y.o.,  male    SUBJECTIVE  Ff-up    HTN- taking lisinopril . Pt with h/o CKD  HL- started mevacor on last visit, no reported side effects. Reviewed labs, good response. He is a chronic smoker, used to be 2-3 ppd in his younger years, now about 5 cig/ day    Hypothyroidism- improved med compliance, tsh now wnl    Remote h/o throat ca s/p radiation/chemotherapy 2003    ROS:  See HPI, all others negative        Patient Active Problem List   Diagnosis Code    Essential hypertension I10    Acquired hypothyroidism E03.9    Tobacco use Z72.0    CKD (chronic kidney disease) stage 3, GFR 30-59 ml/min N18.3       Current Outpatient Prescriptions   Medication Sig Dispense Refill    levothyroxine (SYNTHROID) 150 mcg tablet Take 1 Tab by mouth Daily (before breakfast). 60 Tab 0    lovastatin (MEVACOR) 20 mg tablet Take 2 Tabs by mouth nightly. 180 Tab 0    lisinopril (PRINIVIL, ZESTRIL) 10 mg tablet Take  by mouth daily. No Known Allergies    Past Medical History:   Diagnosis Date    Cancer ([facility] [address] 2012    throat    Gout     Hx of colonoscopy with polypectomy 11/15/2016    rpt 5 years 2021, dr. Kym Pandya Hypertension     Pancreatitis 1989       Social History     Social History    Marital status:      Spouse name: N/A    Number of children: N/A    Years of education: N/A     Occupational History    Not on file.      Social History Main Topics    Smoking status: Current Every Day Smoker     Packs/day: 0.25     Years: 50.00    Smokeless tobacco: Never Used    Alcohol use Yes      Comment: 3    Drug use: No    Sexual activity: Yes     Partners: Female     Other Topics Concern    Not on file     Social History Narrative       Family History   Problem Relation Age of Onset    Cancer Father          OBJECTIVE    Physical Exam:     Visit Vitals    /82 (BP 1 Location: Left arm, BP Patient Position: Sitting)    Pulse (!) 54    Temp 98 °F (36.7 °C) (Oral)    Resp 13  Ht 5' 6\" (1.676 m)    Wt 158 lb 3.2 oz (71.8 kg)    SpO2 99%    BMI 25.53 kg/m2       General: alert, AA, in no apparent distress or pain  Neck: supple, no adenopathy palpated  CVS: normal rate, regular rhythm, distinct S1 and S2  Lungs:clear to ausculation bilaterally, no crackles, wheezing or rhonchi noted  Extremities: no edema, no cyanosis,  Skin: warm, no lesions, rashes noted  Psych:  mood and affect normal    Results for orders placed or performed during the hospital encounter of 07/11/18   LIPID PANEL   Result Value Ref Range    LIPID PROFILE          Cholesterol, total 140 <200 MG/DL    Triglyceride 77 <150 MG/DL    HDL Cholesterol 59 40 - 60 MG/DL    LDL, calculated 65.6 0 - 100 MG/DL    VLDL, calculated 15.4 MG/DL    CHOL/HDL Ratio 2.4 0 - 5.0     METABOLIC PANEL, COMPREHENSIVE   Result Value Ref Range    Sodium 144 136 - 145 mmol/L    Potassium 3.9 3.5 - 5.5 mmol/L    Chloride 111 (H) 100 - 108 mmol/L    CO2 29 21 - 32 mmol/L    Anion gap 4 3.0 - 18 mmol/L    Glucose 89 74 - 99 mg/dL    BUN 16 7.0 - 18 MG/DL    Creatinine 1.42 (H) 0.6 - 1.3 MG/DL    BUN/Creatinine ratio 11 (L) 12 - 20      GFR est AA 59 (L) >60 ml/min/1.73m2    GFR est non-AA 49 (L) >60 ml/min/1.73m2    Calcium 8.2 (L) 8.5 - 10.1 MG/DL    Bilirubin, total 0.3 0.2 - 1.0 MG/DL    ALT (SGPT) 14 (L) 16 - 61 U/L    AST (SGOT) 17 15 - 37 U/L    Alk.  phosphatase 88 45 - 117 U/L    Protein, total 6.0 (L) 6.4 - 8.2 g/dL    Albumin 2.9 (L) 3.4 - 5.0 g/dL    Globulin 3.1 2.0 - 4.0 g/dL    A-G Ratio 0.9 0.8 - 1.7     TSH 3RD GENERATION   Result Value Ref Range    TSH 1.54 0.36 - 3.74 uIU/mL         ASSESSMENT/PLAN  Diagnoses and all orders for this visit:     Essential hypertension  Controlled, cont lisinopril    Hyperlipidemia  Good range on mevacor, to cont    Acquired hypothyroidism  tsh now wnl, commended on med compliance  Cont synthroid to 137 mcgs/day  Recheck tsh in 3 months     Tobacco use  Counseled on cessation  AAA screen negative 2018  pcv 23 completed  Annual flu vaccine     CKD stage 3  Avoid nsaids  Monitoring, cont lisinopril  Optimize cv risk reduction  BMP in 3 months    Follow-up Disposition:  Return in about 3 months (around 10/17/2018), or if symptoms worsen or fail to improve. Patient/guardian understands plan of care. Patient has provided input and agrees with goals. Future labs to be discussed on next visit.

## 2018-07-17 NOTE — PATIENT INSTRUCTIONS

## 2018-07-17 NOTE — MR AVS SNAPSHOT
[address] 
[phone] Patient: Bhavik Harry MRN: [id number] :1945 Visit Information Date & Time Provider Department Dept. Phone Encounter #  
 2018  9:30 AM Danni Izaguirre, [address] [place] [address] [id number] Follow-up Instructions Return in about 3 months (around 10/17/2018), or if symptoms worsen or fail to improve. Upcoming Health Maintenance Date Due DTaP/Tdap/Td series (1 - Tdap) 1966 ZOSTER VACCINE AGE 60> 2005 GLAUCOMA SCREENING Q2Y 2010 Influenza Age 5 to Adult 2018 MEDICARE YEARLY EXAM 2019 COLONOSCOPY 11/15/2021 Allergies as of 2018  Review Complete On: 2018 By: Danni Izaguirre MD  
 No Known Allergies Current Immunizations  Reviewed on 2018 Name Date Pneumococcal Conjugate (PCV-13) 10/21/2016 Pneumococcal Polysaccharide (PPSV-23) 2014 Not reviewed this visit You Were Diagnosed With   
  
 Codes Comments Essential hypertension    -  Primary ICD-10-CM: I10 
ICD-9-CM: 401.9 Acquired hypothyroidism     ICD-10-CM: E03.9 ICD-9-CM: 244.9 Tobacco use     ICD-10-CM: Z72.0 ICD-9-CM: 305.1 CKD (chronic kidney disease) stage 3, GFR 30-59 ml/min     ICD-10-CM: N18.3 ICD-9-CM: 719. 3 Vitals BP Pulse Temp Resp Height(growth percentile) Weight(growth percentile) 130/82 (BP 1 Location: Left arm, BP Patient Position: Sitting) (!) 54 98 °F (36.7 °C) (Oral) 13 5' 6\" (1.676 m) 158 lb 3.2 oz (71.8 kg) SpO2 BMI Smoking Status 99% 25.53 kg/m2 Current Every Day Smoker Vitals History BMI and BSA Data Body Mass Index Body Surface Area 25.53 kg/m 2 1.83 m 2 Preferred Pharmacy Pharmacy Name Phone [address], [address] [facility] Your Updated Medication List  
  
 This list is accurate as of 7/17/18 10:04 AM.  Always use your most recent med list.  
  
  
  
  
 levothyroxine 150 mcg tablet Commonly known as:  SYNTHROID Take 1 Tab by mouth Daily (before breakfast). lisinopril 10 mg tablet Commonly known as:  Edgar Hu Take  by mouth daily. lovastatin 20 mg tablet Commonly known as:  MEVACOR Take 2 Tabs by mouth nightly. Follow-up Instructions Return in about 3 months (around 10/17/2018), or if symptoms worsen or fail to improve. To-Do List   
 10/17/2018 Lab:  METABOLIC PANEL, BASIC   
  
 10/17/2018 Lab:  TSH 3RD GENERATION Patient Instructions DASH Diet: Care Instructions Your Care Instructions The DASH diet is an eating plan that can help lower your blood pressure. DASH stands for Dietary Approaches to Stop Hypertension. Hypertension is high blood pressure. The DASH diet focuses on eating foods that are high in calcium, potassium, and magnesium. These nutrients can lower blood pressure. The foods that are highest in these nutrients are fruits, vegetables, low-fat dairy products, nuts, seeds, and legumes. But taking calcium, potassium, and magnesium supplements instead of eating foods that are high in those nutrients does not have the same effect. The DASH diet also includes whole grains, fish, and poultry. The DASH diet is one of several lifestyle changes your doctor may recommend to lower your high blood pressure. Your doctor may also want you to decrease the amount of sodium in your diet. Lowering sodium while following the DASH diet can lower blood pressure even further than just the DASH diet alone. Follow-up care is a key part of your treatment and safety. Be sure to make and go to all appointments, and call your doctor if you are having problems. It's also a good idea to know your test results and keep a list of the medicines you take. How can you care for yourself at home? Following the DASH diet · Eat 4 to 5 servings of fruit each day. A serving is 1 medium-sized piece of fruit, ½ cup chopped or canned fruit, 1/4 cup dried fruit, or 4 ounces (½ cup) of fruit juice. Choose fruit more often than fruit juice. · Eat 4 to 5 servings of vegetables each day. A serving is 1 cup of lettuce or raw leafy vegetables, ½ cup of chopped or cooked vegetables, or 4 ounces (½ cup) of vegetable juice. Choose vegetables more often than vegetable juice. · Get 2 to 3 servings of low-fat and fat-free dairy each day. A serving is 8 ounces of milk, 1 cup of yogurt, or 1 ½ ounces of cheese. · Eat 6 to 8 servings of grains each day. A serving is 1 slice of bread, 1 ounce of dry cereal, or ½ cup of cooked rice, pasta, or cooked cereal. Try to choose whole-grain products as much as possible. · Limit lean meat, poultry, and fish to 2 servings each day. A serving is 3 ounces, about the size of a deck of cards. · Eat 4 to 5 servings of nuts, seeds, and legumes (cooked dried beans, lentils, and split peas) each week. A serving is 1/3 cup of nuts, 2 tablespoons of seeds, or ½ cup of cooked beans or peas. · Limit fats and oils to 2 to 3 servings each day. A serving is 1 teaspoon of vegetable oil or 2 tablespoons of salad dressing. · Limit sweets and added sugars to 5 servings or less a week. A serving is 1 tablespoon jelly or jam, ½ cup sorbet, or 1 cup of lemonade. · Eat less than 2,300 milligrams (mg) of sodium a day. If you limit your sodium to 1,500 mg a day, you can lower your blood pressure even more. Tips for success · Start small. Do not try to make dramatic changes to your diet all at once. You might feel that you are missing out on your favorite foods and then be more likely to not follow the plan. Make small changes, and stick with them. Once those changes become habit, add a few more changes. · Try some of the following: ¨ Make it a goal to eat a fruit or vegetable at every meal and at snacks. This will make it easy to get the recommended amount of fruits and vegetables each day. ¨ Try yogurt topped with fruit and nuts for a snack or healthy dessert. ¨ Add lettuce, tomato, cucumber, and onion to sandwiches. ¨ Combine a ready-made pizza crust with low-fat mozzarella cheese and lots of vegetable toppings. Try using tomatoes, squash, spinach, broccoli, carrots, cauliflower, and onions. ¨ Have a variety of cut-up vegetables with a low-fat dip as an appetizer instead of chips and dip. ¨ Sprinkle sunflower seeds or chopped almonds over salads. Or try adding chopped walnuts or almonds to cooked vegetables. ¨ Try some vegetarian meals using beans and peas. Add garbanzo or kidney beans to salads. Make burritos and tacos with mashed rodriguez beans or black beans. Where can you learn more? Go to http://mike-marcos.info/. Enter R009 in the search box to learn more about \"DASH Diet: Care Instructions. \" Current as of: December 6, 2017 Content Version: 11.7 © 9381-8571 [employer]. Care instructions adapted under license by [employer] (which disclaims liability or warranty for this information). If you have questions about a medical condition or this instruction, always ask your healthcare professional. [place] [address] any warranty or liability for your use of this information. Introducing [facility]! [employer] introduces Sell My Timeshare NOW patient portal. Now you can access parts of your medical record, email your doctor's office, and request medication refills online. 1. In your internet browser, go to [email or web address] 2. Click on the First Time User? Click Here link in the Sign In box. You will see the New Member Sign Up page. 3. Enter your Sell My Timeshare NOW Access Code exactly as it appears below. You will not need to use this code after youve completed the sign-up process.  If you do not sign up before the expiration date, you must request a new code. · [employer] Access Code: [email or web address] Expires: 10/15/2018 10:03 AM 
 
4. Enter the last four digits of your Social Security Number (xxxx) and Date of Birth (mm/dd/yyyy) as indicated and click Submit. You will be taken to the next sign-up page. 5. Create a [employer] ID. This will be your [employer] login ID and cannot be changed, so think of one that is secure and easy to remember. 6. Create a [employer] password. You can change your password at any time. 7. Enter your Password Reset Question and Answer. This can be used at a later time if you forget your password. 8. Enter your e-mail address. You will receive e-mail notification when new information is available in [address] 9. Click Sign Up. You can now view and download portions of your medical record. 10. Click the Download Summary menu link to download a portable copy of your medical information. If you have questions, please visit the Frequently Asked Questions section of the [employer] website. Remember, [employer] is NOT to be used for urgent needs. For medical emergencies, dial 911. Now available from your iPhone and Android! Please provide this summary of care documentation to your next provider. Your primary care clinician is listed as Cas Feliciano. If you have any questions after today's visit, please call [phone].

## 2018-08-23 RX ORDER — LEVOTHYROXINE SODIUM 150 UG/1
TABLET ORAL
Qty: 90 TAB | Refills: 2 | Status: SHIPPED | OUTPATIENT
Start: 2018-08-23 | End: 2019-06-26 | Stop reason: SDUPTHER

## 2018-08-23 NOTE — TELEPHONE ENCOUNTER
This patient contacted office for the following prescriptions to be filled:    Medication requested :   Requested Prescriptions     Pending Prescriptions Disp Refills    levothyroxine (SYNTHROID) 150 mcg tablet [Pharmacy Med Name: LEVOTHYROXIN 150MCG TAB] 60 Tab 0     Sig: TAKE 1 TABLET BY MOUTH ONCE DAILY BEFORE BREAKFAST     PCP: [address] [place] [address] or Print: [facility]  Mail order or Local pharmacy [address] [place] [address]    Scheduled appointment if not seen by current providers in office: LOV 7/17/2018 f/u 10/18/2018

## 2018-10-17 ENCOUNTER — HOSPITAL ENCOUNTER (OUTPATIENT)
Dept: LAB | Age: 73
Discharge: HOME | End: 2018-10-17
Payer: MEDICARE

## 2018-10-17 DIAGNOSIS — E03.9 ACQUIRED HYPOTHYROIDISM: ICD-10-CM

## 2018-10-17 DIAGNOSIS — I10 ESSENTIAL HYPERTENSION: ICD-10-CM

## 2018-10-17 DIAGNOSIS — N18.30 CKD (CHRONIC KIDNEY DISEASE) STAGE 3, GFR 30-59 ML/MIN (HCC): ICD-10-CM

## 2018-10-17 LAB
ANION GAP SERPL CALC-SCNC: 6 MMOL/L (ref 3–18)
BUN SERPL-MCNC: 15 MG/DL (ref 7–18)
BUN/CREAT SERPL: 11 (ref 12–20)
CALCIUM SERPL-MCNC: 8.4 MG/DL (ref 8.5–10.1)
CHLORIDE SERPL-SCNC: 107 MMOL/L (ref 100–108)
CO2 SERPL-SCNC: 29 MMOL/L (ref 21–32)
CREAT SERPL-MCNC: 1.37 MG/DL (ref 0.6–1.3)
GLUCOSE SERPL-MCNC: 87 MG/DL (ref 74–99)
POTASSIUM SERPL-SCNC: 3.5 MMOL/L (ref 3.5–5.5)
SODIUM SERPL-SCNC: 142 MMOL/L (ref 136–145)
TSH SERPL DL<=0.05 MIU/L-ACNC: 38 UIU/ML (ref 0.36–3.74)

## 2018-10-17 PROCEDURE — 36415 COLL VENOUS BLD VENIPUNCTURE: CPT | Performed by: FAMILY MEDICINE

## 2018-10-17 PROCEDURE — 80048 BASIC METABOLIC PNL TOTAL CA: CPT | Performed by: FAMILY MEDICINE

## 2018-10-17 PROCEDURE — 84443 ASSAY THYROID STIM HORMONE: CPT | Performed by: FAMILY MEDICINE

## 2018-10-18 ENCOUNTER — OFFICE VISIT (OUTPATIENT)
Dept: FAMILY MEDICINE CLINIC | Age: 73
End: 2018-10-18

## 2018-10-18 ENCOUNTER — HOSPITAL ENCOUNTER (OUTPATIENT)
Dept: LAB | Age: 73
Discharge: HOME | End: 2018-10-18
Payer: MEDICARE

## 2018-10-18 VITALS
WEIGHT: 160.6 LBS | TEMPERATURE: 97.9 F | OXYGEN SATURATION: 98 % | DIASTOLIC BLOOD PRESSURE: 110 MMHG | BODY MASS INDEX: 25.81 KG/M2 | SYSTOLIC BLOOD PRESSURE: 174 MMHG | RESPIRATION RATE: 17 BRPM | HEART RATE: 55 BPM | HEIGHT: 66 IN

## 2018-10-18 DIAGNOSIS — E03.9 HYPOTHYROIDISM, UNSPECIFIED TYPE: Primary | ICD-10-CM

## 2018-10-18 DIAGNOSIS — E03.9 HYPOTHYROIDISM, UNSPECIFIED TYPE: ICD-10-CM

## 2018-10-18 LAB — TSH SERPL DL<=0.05 MIU/L-ACNC: 35.4 UIU/ML (ref 0.36–3.74)

## 2018-10-18 PROCEDURE — 84443 ASSAY THYROID STIM HORMONE: CPT | Performed by: FAMILY MEDICINE

## 2018-10-18 PROCEDURE — 36415 COLL VENOUS BLD VENIPUNCTURE: CPT | Performed by: FAMILY MEDICINE

## 2018-10-18 RX ORDER — AMLODIPINE BESYLATE 2.5 MG/1
2.5 TABLET ORAL DAILY
Qty: 90 TAB | Refills: 0 | Status: SHIPPED | OUTPATIENT
Start: 2018-10-18 | End: 2018-12-26

## 2018-10-18 NOTE — PROGRESS NOTES
Blanca Monet, 67 y.o.,  male    SUBJECTIVE  Ff-up    HTN- taking lisinopril . Pt with h/o CKD. He reports lisinopril dose was lowered earlier this year. Has had normotensive BP from past few visits with me. He reports increased salted herring consumptions    HL- Taking mevacor. good response. He is a chronic smoker, used to be 2-3 ppd in his younger years, now about 5 cig/ day    Hypothyroidism- reports good synthroid compliance. tsh level is elevated, will recheck and adjust accordingly. Remote h/o throat ca s/p radiation/chemotherapy 2003    ROS:  See HPI, all others negative        Patient Active Problem List   Diagnosis Code    Essential hypertension I10    Acquired hypothyroidism E03.9    Tobacco use Z72.0    CKD (chronic kidney disease) stage 3, GFR 30-59 ml/min (HCC) N18.3       Current Outpatient Medications   Medication Sig Dispense Refill    amLODIPine (NORVASC) 2.5 mg tablet Take 1 Tab by mouth daily. 90 Tab 0    levothyroxine (SYNTHROID) 150 mcg tablet TAKE 1 TABLET BY MOUTH ONCE DAILY BEFORE BREAKFAST 90 Tab 2    lovastatin (MEVACOR) 20 mg tablet Take 2 Tabs by mouth nightly. 180 Tab 0    lisinopril (PRINIVIL, ZESTRIL) 10 mg tablet Take  by mouth daily.          No Known Allergies    Past Medical History:   Diagnosis Date    Cancer ([facility] [address] 2012    throat    Gout     Hx of colonoscopy with polypectomy 11/15/2016    rpt 5 years 2021, dr. Lucie Medley Hypertension     Pancreatitis 1989       Social History     Socioeconomic History    Marital status:      Spouse name: Not on file    Number of children: Not on file    Years of education: Not on file    Highest education level: Not on file   Social Needs    Financial resource strain: Not on file    Food insecurity - worry: Not on file    Food insecurity - inability: Not on file   Sencha needs - medical: Not on file   Sencha needs - non-medical: Not on file   Occupational History    Not on file Tobacco Use    Smoking status: Current Every Day Smoker     Packs/day: 0.25     Years: 50.00     Pack years: 12.50    Smokeless tobacco: Never Used   Substance and Sexual Activity    Alcohol use: Yes     Comment: 3    Drug use: No    Sexual activity: Yes     Partners: Female   Other Topics Concern    Not on file   Social History Narrative    Not on file       Family History   Problem Relation Age of Onset    Cancer Father          OBJECTIVE    Physical Exam:     Visit Vitals  BP (!) 174/110 (BP 1 Location: Left arm, BP Patient Position: Sitting)   Pulse (!) 55   Temp 97.9 °F (36.6 °C) (Oral)   Resp 17   Ht 5' 6\" (1.676 m)   Wt 160 lb 9.6 oz (72.8 kg)   SpO2 98%   BMI 25.92 kg/m²       General: alert, AA, in no apparent distress or pain  Neck: supple, no adenopathy palpated  CVS: normal rate, regular rhythm, distinct S1 and S2  Lungs:clear to ausculation bilaterally, no crackles, wheezing or rhonchi noted  Extremities: no edema, no cyanosis,  Skin: warm, no lesions, rashes noted  Psych:  mood and affect normal    Results for orders placed or performed during the hospital encounter of 10/17/18   TSH 3RD GENERATION   Result Value Ref Range    TSH 38.00 (H) 0.36 - 6.41 uIU/mL   METABOLIC PANEL, BASIC   Result Value Ref Range    Sodium 142 136 - 145 mmol/L    Potassium 3.5 3.5 - 5.5 mmol/L    Chloride 107 100 - 108 mmol/L    CO2 29 21 - 32 mmol/L    Anion gap 6 3.0 - 18 mmol/L    Glucose 87 74 - 99 mg/dL    BUN 15 7.0 - 18 MG/DL    Creatinine 1.37 (H) 0.6 - 1.3 MG/DL    BUN/Creatinine ratio 11 (L) 12 - 20      GFR est AA >60 >60 ml/min/1.73m2    GFR est non-AA 51 (L) >60 ml/min/1.73m2    Calcium 8.4 (L) 8.5 - 10.1 MG/DL         ASSESSMENT/PLAN  Diagnoses and all orders for this visit:     Essential hypertension  Elevated today  Advised DASH diet  Start amlodipine 2.5 mg   cont lisinopril    Hyperlipidemia  Good range on mevacor, to cont    Acquired hypothyroidism  tsh elevated, pt reports med compliance  Will recheck TSH today and adjust accordingly  Currently  On synthroid to 137 mcgs/day     Tobacco use  Counseled on cessation  AAA screen negative 2018  pcv 23 completed  Annual flu vaccine     CKD stage 3  Avoid nsaids  Monitoring, cont lisinopril  Optimize cv risk reduction      Follow-up Disposition:  Return in about 4 weeks (around 11/15/2018), or if symptoms worsen or fail to improve. Patient/guardian understands plan of care. Patient has provided input and agrees with goals. Future labs to be discussed on next visit.

## 2018-10-18 NOTE — PROGRESS NOTES
1. Have you been to the ER, urgent care clinic since your last visit? Hospitalized since your last visit? No    2. Have you seen or consulted any other health care providers outside of the [address] [place] [address] since your last visit? Include any pap smears or colon screening. No     Chief Complaint   Patient presents with    Hypertension    Cholesterol Problem    Hypothyroidism    Chronic Kidney Disease       Patient declined seasonal influenza vaccine today.

## 2018-10-18 NOTE — PATIENT INSTRUCTIONS
DASH Diet: Care Instructions  Your Care Instructions    The DASH diet is an eating plan that can help lower your blood pressure. DASH stands for Dietary Approaches to Stop Hypertension. Hypertension is high blood pressure. The DASH diet focuses on eating foods that are high in calcium, potassium, and magnesium. These nutrients can lower blood pressure. The foods that are highest in these nutrients are fruits, vegetables, low-fat dairy products, nuts, seeds, and legumes. But taking calcium, potassium, and magnesium supplements instead of eating foods that are high in those nutrients does not have the same effect. The DASH diet also includes whole grains, fish, and poultry. The DASH diet is one of several lifestyle changes your doctor may recommend to lower your high blood pressure. Your doctor may also want you to decrease the amount of sodium in your diet. Lowering sodium while following the DASH diet can lower blood pressure even further than just the DASH diet alone. Follow-up care is a key part of your treatment and safety. Be sure to make and go to all appointments, and call your doctor if you are having problems. It's also a good idea to know your test results and keep a list of the medicines you take. How can you care for yourself at home? Following the DASH diet  · Eat 4 to 5 servings of fruit each day. A serving is 1 medium-sized piece of fruit, ½ cup chopped or canned fruit, 1/4 cup dried fruit, or 4 ounces (½ cup) of fruit juice. Choose fruit more often than fruit juice. · Eat 4 to 5 servings of vegetables each day. A serving is 1 cup of lettuce or raw leafy vegetables, ½ cup of chopped or cooked vegetables, or 4 ounces (½ cup) of vegetable juice. Choose vegetables more often than vegetable juice. · Get 2 to 3 servings of low-fat and fat-free dairy each day. A serving is 8 ounces of milk, 1 cup of yogurt, or 1 ½ ounces of cheese. · Eat 6 to 8 servings of grains each day.  A serving is 1 slice of bread, 1 ounce of dry cereal, or ½ cup of cooked rice, pasta, or cooked cereal. Try to choose whole-grain products as much as possible. · Limit lean meat, poultry, and fish to 2 servings each day. A serving is 3 ounces, about the size of a deck of cards. · Eat 4 to 5 servings of nuts, seeds, and legumes (cooked dried beans, lentils, and split peas) each week. A serving is 1/3 cup of nuts, 2 tablespoons of seeds, or ½ cup of cooked beans or peas. · Limit fats and oils to 2 to 3 servings each day. A serving is 1 teaspoon of vegetable oil or 2 tablespoons of salad dressing. · Limit sweets and added sugars to 5 servings or less a week. A serving is 1 tablespoon jelly or jam, ½ cup sorbet, or 1 cup of lemonade. · Eat less than 2,300 milligrams (mg) of sodium a day. If you limit your sodium to 1,500 mg a day, you can lower your blood pressure even more. Tips for success  · Start small. Do not try to make dramatic changes to your diet all at once. You might feel that you are missing out on your favorite foods and then be more likely to not follow the plan. Make small changes, and stick with them. Once those changes become habit, add a few more changes. · Try some of the following:  ? Make it a goal to eat a fruit or vegetable at every meal and at snacks. This will make it easy to get the recommended amount of fruits and vegetables each day. ? Try yogurt topped with fruit and nuts for a snack or healthy dessert. ? Add lettuce, tomato, cucumber, and onion to sandwiches. ? Combine a ready-made pizza crust with low-fat mozzarella cheese and lots of vegetable toppings. Try using tomatoes, squash, spinach, broccoli, carrots, cauliflower, and onions. ? Have a variety of cut-up vegetables with a low-fat dip as an appetizer instead of chips and dip. ? Sprinkle sunflower seeds or chopped almonds over salads. Or try adding chopped walnuts or almonds to cooked vegetables.   ? Try some vegetarian meals using beans and peas. Add garbanzo or kidney beans to salads. Make burritos and tacos with mashed rodriguez beans or black beans. Where can you learn more? Go to http://mike-marcos.info/. Enter T795 in the search box to learn more about \"DASH Diet: Care Instructions. \"  Current as of: December 6, 2017  Content Version: 11.8  © 8030-9357 [employer]. Care instructions adapted under license by [employer] (which disclaims liability or warranty for this information). If you have questions about a medical condition or this instruction, always ask your healthcare professional. [address] any warranty or liability for your use of this information.

## 2018-10-19 DIAGNOSIS — E03.9 ACQUIRED HYPOTHYROIDISM: Primary | ICD-10-CM

## 2018-10-19 NOTE — PROGRESS NOTES
Patient identified with 2 identifiers (name and ). Patient he has 62 pills left. Patient states that he does miss dose for 2 or 3 days then goes back and starts taking again. Patient advised on importance of not missing doses. Patient was advise to take medication daily.

## 2018-10-23 NOTE — PROGRESS NOTES
Patient identified with 2 identifiers (name and ).   Patient aware to take synthroid daily set alarms and to recheck labs in 6 weeks

## 2018-11-15 ENCOUNTER — OFFICE VISIT (OUTPATIENT)
Dept: FAMILY MEDICINE CLINIC | Age: 73
End: 2018-11-15

## 2018-11-15 ENCOUNTER — HOSPITAL ENCOUNTER (OUTPATIENT)
Dept: LAB | Age: 73
Discharge: HOME | End: 2018-11-15
Payer: MEDICARE

## 2018-11-15 VITALS
HEIGHT: 66 IN | WEIGHT: 157.8 LBS | HEART RATE: 62 BPM | SYSTOLIC BLOOD PRESSURE: 168 MMHG | DIASTOLIC BLOOD PRESSURE: 100 MMHG | TEMPERATURE: 98.1 F | RESPIRATION RATE: 16 BRPM | BODY MASS INDEX: 25.36 KG/M2 | OXYGEN SATURATION: 99 %

## 2018-11-15 DIAGNOSIS — E03.9 ACQUIRED HYPOTHYROIDISM: ICD-10-CM

## 2018-11-15 DIAGNOSIS — N18.30 CKD (CHRONIC KIDNEY DISEASE) STAGE 3, GFR 30-59 ML/MIN (HCC): ICD-10-CM

## 2018-11-15 DIAGNOSIS — I10 ESSENTIAL HYPERTENSION: Primary | ICD-10-CM

## 2018-11-15 DIAGNOSIS — Z72.0 TOBACCO USE: ICD-10-CM

## 2018-11-15 LAB — TSH SERPL DL<=0.05 MIU/L-ACNC: 0.68 UIU/ML (ref 0.36–3.74)

## 2018-11-15 PROCEDURE — 84443 ASSAY THYROID STIM HORMONE: CPT

## 2018-11-15 PROCEDURE — 36415 COLL VENOUS BLD VENIPUNCTURE: CPT

## 2018-11-15 RX ORDER — CARVEDILOL 6.25 MG/1
6.25 TABLET ORAL 2 TIMES DAILY WITH MEALS
Qty: 90 TAB | Refills: 0 | Status: SHIPPED | OUTPATIENT
Start: 2018-11-15 | End: 2018-12-31 | Stop reason: SDUPTHER

## 2018-11-15 NOTE — PROGRESS NOTES
1. Have you been to the ER, urgent care clinic since your last visit? Hospitalized since your last visit? No    2. Have you seen or consulted any other health care providers outside of the [address] [place] [address] since your last visit? Include any pap smears or colon screening.  No    Chief Complaint   Patient presents with    Hypertension    Cholesterol Problem    Hypothyroidism

## 2018-11-15 NOTE — PROGRESS NOTES
Reshma Barnes, 68 y.o.,  male    SUBJECTIVE  Ff-up    HTN-added norvasc on last visit with elevatedc BP. Says not able to fill due to cost.  taking lisinopril . Pt with h/o CKD. He reports lisinopril dose was lowered earlier this year. He reports decreasing salt intake. HL- Taking mevacor. good response. He is a chronic smoker, used to be 2-3 ppd in his younger years, now about 5 cig/ day    Hypothyroidism- reports previous inconsistent use. We verified pill count and likely cause of elevated . tsh level. He reports improvement with compliance. and plan for recheck TSH today. Remote h/o throat ca s/p radiation/chemotherapy 2003    ROS:  See HPI, all others negative        Patient Active Problem List   Diagnosis Code    Essential hypertension I10    Acquired hypothyroidism E03.9    Tobacco use Z72.0    CKD (chronic kidney disease) stage 3, GFR 30-59 ml/min ([facility]) N18.3       Current Outpatient Medications   Medication Sig Dispense Refill    carvedilol (COREG) 6.25 mg tablet Take 1 Tab by mouth two (2) times daily (with meals). 90 Tab 0    levothyroxine (SYNTHROID) 150 mcg tablet TAKE 1 TABLET BY MOUTH ONCE DAILY BEFORE BREAKFAST 90 Tab 2    lovastatin (MEVACOR) 20 mg tablet Take 2 Tabs by mouth nightly. 180 Tab 0    lisinopril (PRINIVIL, ZESTRIL) 10 mg tablet Take  by mouth daily.  amLODIPine (NORVASC) 2.5 mg tablet Take 1 Tab by mouth daily.  90 Tab 0       No Known Allergies    Past Medical History:   Diagnosis Date    Cancer ([facility] [address] 2012    throat    Gout     Hx of colonoscopy with polypectomy 11/15/2016    rpt 5 years 2021, dr. Adelita Santamaria Hypertension     Pancreatitis 1989       Social History     Socioeconomic History    Marital status:      Spouse name: Not on file    Number of children: Not on file    Years of education: Not on file    Highest education level: Not on file   Social Needs    Financial resource strain: Not on file    Food insecurity - worry: Not on file  Food insecurity - inability: Not on file    Transportation needs - medical: Not on file   [email or web address] needs - non-medical: Not on file   Occupational History    Not on file   Tobacco Use    Smoking status: Current Every Day Smoker     Packs/day: 0.25     Years: 50.00     Pack years: 12.50    Smokeless tobacco: Never Used   Substance and Sexual Activity    Alcohol use: Yes     Comment: 3    Drug use: No    Sexual activity: Yes     Partners: Female   Other Topics Concern    Not on file   Social History Narrative    Not on file       Family History   Problem Relation Age of Onset    Cancer Father          OBJECTIVE    Physical Exam:     Visit Vitals  BP (!) 168/100 (BP 1 Location: Left arm, BP Patient Position: Sitting)   Pulse 62   Temp 98.1 °F (36.7 °C) (Oral)   Resp 16   Ht 5' 6\" (1.676 m)   Wt 157 lb 12.8 oz (71.6 kg)   SpO2 99%   BMI 25.47 kg/m²       General: alert, AA, in no apparent distress or pain  Neck: supple, no adenopathy palpated  CVS: normal rate, regular rhythm, distinct S1 and S2  Lungs:clear to ausculation bilaterally, no crackles, wheezing or rhonchi noted  Extremities: no edema, no cyanosis,  Skin: warm, no lesions, rashes noted  Psych:  mood and affect normal    Results for orders placed or performed during the hospital encounter of 10/18/18   TSH 3RD GENERATION   Result Value Ref Range    TSH 35.40 (H) 0.36 - 3.74 uIU/mL         ASSESSMENT/PLAN  Diagnoses and all orders for this visit:     Essential hypertension  Elevated today  Advised DASH diet  Start coreg 6.25 mg, norvasc too expensive.  H/o CKD   cont lisinopril    Hyperlipidemia  Good range on mevacor, to cont    Acquired hypothyroidism  tsh elevated, pt reports med compliance  Will recheck TSH today and adjust accordingly  Currently  On synthroid to 137 mcgs/day     Tobacco use  Counseled on cessation  AAA screen negative 2018  pcv 23 completed  Annual flu vaccine     CKD stage 3  Avoid nsaids  Monitoring, cont lisinopril  Optimize cv risk reduction      Follow-up Disposition:  Return in about 4 weeks (around 12/13/2018), or if symptoms worsen or fail to improve. Patient/guardian understands plan of care. Patient has provided input and agrees with goals. Future labs to be discussed on next visit.

## 2018-11-15 NOTE — PROGRESS NOTES
Thyroid check now  back to normal range, cont current dose. Remind on med compliance. pls notify pt.

## 2018-11-15 NOTE — PATIENT INSTRUCTIONS
DASH Diet: Care Instructions  Your Care Instructions    The DASH diet is an eating plan that can help lower your blood pressure. DASH stands for Dietary Approaches to Stop Hypertension. Hypertension is high blood pressure. The DASH diet focuses on eating foods that are high in calcium, potassium, and magnesium. These nutrients can lower blood pressure. The foods that are highest in these nutrients are fruits, vegetables, low-fat dairy products, nuts, seeds, and legumes. But taking calcium, potassium, and magnesium supplements instead of eating foods that are high in those nutrients does not have the same effect. The DASH diet also includes whole grains, fish, and poultry. The DASH diet is one of several lifestyle changes your doctor may recommend to lower your high blood pressure. Your doctor may also want you to decrease the amount of sodium in your diet. Lowering sodium while following the DASH diet can lower blood pressure even further than just the DASH diet alone. Follow-up care is a key part of your treatment and safety. Be sure to make and go to all appointments, and call your doctor if you are having problems. It's also a good idea to know your test results and keep a list of the medicines you take. How can you care for yourself at home? Following the DASH diet  · Eat 4 to 5 servings of fruit each day. A serving is 1 medium-sized piece of fruit, ½ cup chopped or canned fruit, 1/4 cup dried fruit, or 4 ounces (½ cup) of fruit juice. Choose fruit more often than fruit juice. · Eat 4 to 5 servings of vegetables each day. A serving is 1 cup of lettuce or raw leafy vegetables, ½ cup of chopped or cooked vegetables, or 4 ounces (½ cup) of vegetable juice. Choose vegetables more often than vegetable juice. · Get 2 to 3 servings of low-fat and fat-free dairy each day. A serving is 8 ounces of milk, 1 cup of yogurt, or 1 ½ ounces of cheese. · Eat 6 to 8 servings of grains each day.  A serving is 1 slice of bread, 1 ounce of dry cereal, or ½ cup of cooked rice, pasta, or cooked cereal. Try to choose whole-grain products as much as possible. · Limit lean meat, poultry, and fish to 2 servings each day. A serving is 3 ounces, about the size of a deck of cards. · Eat 4 to 5 servings of nuts, seeds, and legumes (cooked dried beans, lentils, and split peas) each week. A serving is 1/3 cup of nuts, 2 tablespoons of seeds, or ½ cup of cooked beans or peas. · Limit fats and oils to 2 to 3 servings each day. A serving is 1 teaspoon of vegetable oil or 2 tablespoons of salad dressing. · Limit sweets and added sugars to 5 servings or less a week. A serving is 1 tablespoon jelly or jam, ½ cup sorbet, or 1 cup of lemonade. · Eat less than 2,300 milligrams (mg) of sodium a day. If you limit your sodium to 1,500 mg a day, you can lower your blood pressure even more. Tips for success  · Start small. Do not try to make dramatic changes to your diet all at once. You might feel that you are missing out on your favorite foods and then be more likely to not follow the plan. Make small changes, and stick with them. Once those changes become habit, add a few more changes. · Try some of the following:  ? Make it a goal to eat a fruit or vegetable at every meal and at snacks. This will make it easy to get the recommended amount of fruits and vegetables each day. ? Try yogurt topped with fruit and nuts for a snack or healthy dessert. ? Add lettuce, tomato, cucumber, and onion to sandwiches. ? Combine a ready-made pizza crust with low-fat mozzarella cheese and lots of vegetable toppings. Try using tomatoes, squash, spinach, broccoli, carrots, cauliflower, and onions. ? Have a variety of cut-up vegetables with a low-fat dip as an appetizer instead of chips and dip. ? Sprinkle sunflower seeds or chopped almonds over salads. Or try adding chopped walnuts or almonds to cooked vegetables.   ? Try some vegetarian meals using beans and peas. Add garbanzo or kidney beans to salads. Make burritos and tacos with mashed rodriguez beans or black beans. Where can you learn more? Go to http://mike-marcos.info/. Enter P270 in the search box to learn more about \"DASH Diet: Care Instructions. \"  Current as of: December 6, 2017  Content Version: 11.8  © 5605-8741 [employer]. Care instructions adapted under license by [employer]â„¢ (which disclaims liability or warranty for this information). If you have questions about a medical condition or this instruction, always ask your healthcare professional. [address] any warranty or liability for your use of this information.

## 2018-11-26 NOTE — PROGRESS NOTES
Patient identified with 2 identifiers (name and ).   Patient aware of normal TSH and to continue current doise

## 2018-12-26 ENCOUNTER — HOSPITAL ENCOUNTER (OUTPATIENT)
Dept: LAB | Age: 73
Discharge: HOME | End: 2018-12-26
Payer: MEDICARE

## 2018-12-26 ENCOUNTER — OFFICE VISIT (OUTPATIENT)
Dept: FAMILY MEDICINE CLINIC | Age: 73
End: 2018-12-26

## 2018-12-26 VITALS
TEMPERATURE: 98.2 F | DIASTOLIC BLOOD PRESSURE: 90 MMHG | BODY MASS INDEX: 26.13 KG/M2 | WEIGHT: 162.6 LBS | HEIGHT: 66 IN | RESPIRATION RATE: 15 BRPM | SYSTOLIC BLOOD PRESSURE: 150 MMHG | HEART RATE: 58 BPM | OXYGEN SATURATION: 98 %

## 2018-12-26 DIAGNOSIS — E03.9 ACQUIRED HYPOTHYROIDISM: ICD-10-CM

## 2018-12-26 DIAGNOSIS — I10 ESSENTIAL HYPERTENSION: Primary | ICD-10-CM

## 2018-12-26 DIAGNOSIS — N18.30 CKD (CHRONIC KIDNEY DISEASE) STAGE 3, GFR 30-59 ML/MIN (HCC): ICD-10-CM

## 2018-12-26 DIAGNOSIS — I10 ESSENTIAL HYPERTENSION: ICD-10-CM

## 2018-12-26 DIAGNOSIS — Z72.0 TOBACCO USE: ICD-10-CM

## 2018-12-26 LAB
ANION GAP SERPL CALC-SCNC: 6 MMOL/L (ref 3–18)
BUN SERPL-MCNC: 16 MG/DL (ref 7–18)
BUN/CREAT SERPL: 12 (ref 12–20)
CALCIUM SERPL-MCNC: 8.8 MG/DL (ref 8.5–10.1)
CHLORIDE SERPL-SCNC: 109 MMOL/L (ref 100–108)
CO2 SERPL-SCNC: 27 MMOL/L (ref 21–32)
CREAT SERPL-MCNC: 1.36 MG/DL (ref 0.6–1.3)
GLUCOSE SERPL-MCNC: 74 MG/DL (ref 74–99)
POTASSIUM SERPL-SCNC: 4.3 MMOL/L (ref 3.5–5.5)
SODIUM SERPL-SCNC: 142 MMOL/L (ref 136–145)
TSH SERPL DL<=0.05 MIU/L-ACNC: 1.13 UIU/ML (ref 0.36–3.74)

## 2018-12-26 PROCEDURE — 36415 COLL VENOUS BLD VENIPUNCTURE: CPT

## 2018-12-26 PROCEDURE — 84443 ASSAY THYROID STIM HORMONE: CPT

## 2018-12-26 PROCEDURE — 80048 BASIC METABOLIC PNL TOTAL CA: CPT

## 2018-12-26 NOTE — PATIENT INSTRUCTIONS
DASH Diet: Care Instructions  Your Care Instructions    The DASH diet is an eating plan that can help lower your blood pressure. DASH stands for Dietary Approaches to Stop Hypertension. Hypertension is high blood pressure. The DASH diet focuses on eating foods that are high in calcium, potassium, and magnesium. These nutrients can lower blood pressure. The foods that are highest in these nutrients are fruits, vegetables, low-fat dairy products, nuts, seeds, and legumes. But taking calcium, potassium, and magnesium supplements instead of eating foods that are high in those nutrients does not have the same effect. The DASH diet also includes whole grains, fish, and poultry. The DASH diet is one of several lifestyle changes your doctor may recommend to lower your high blood pressure. Your doctor may also want you to decrease the amount of sodium in your diet. Lowering sodium while following the DASH diet can lower blood pressure even further than just the DASH diet alone. Follow-up care is a key part of your treatment and safety. Be sure to make and go to all appointments, and call your doctor if you are having problems. It's also a good idea to know your test results and keep a list of the medicines you take. How can you care for yourself at home? Following the DASH diet  · Eat 4 to 5 servings of fruit each day. A serving is 1 medium-sized piece of fruit, ½ cup chopped or canned fruit, 1/4 cup dried fruit, or 4 ounces (½ cup) of fruit juice. Choose fruit more often than fruit juice. · Eat 4 to 5 servings of vegetables each day. A serving is 1 cup of lettuce or raw leafy vegetables, ½ cup of chopped or cooked vegetables, or 4 ounces (½ cup) of vegetable juice. Choose vegetables more often than vegetable juice. · Get 2 to 3 servings of low-fat and fat-free dairy each day. A serving is 8 ounces of milk, 1 cup of yogurt, or 1 ½ ounces of cheese. · Eat 6 to 8 servings of grains each day.  A serving is 1 slice of bread, 1 ounce of dry cereal, or ½ cup of cooked rice, pasta, or cooked cereal. Try to choose whole-grain products as much as possible. · Limit lean meat, poultry, and fish to 2 servings each day. A serving is 3 ounces, about the size of a deck of cards. · Eat 4 to 5 servings of nuts, seeds, and legumes (cooked dried beans, lentils, and split peas) each week. A serving is 1/3 cup of nuts, 2 tablespoons of seeds, or ½ cup of cooked beans or peas. · Limit fats and oils to 2 to 3 servings each day. A serving is 1 teaspoon of vegetable oil or 2 tablespoons of salad dressing. · Limit sweets and added sugars to 5 servings or less a week. A serving is 1 tablespoon jelly or jam, ½ cup sorbet, or 1 cup of lemonade. · Eat less than 2,300 milligrams (mg) of sodium a day. If you limit your sodium to 1,500 mg a day, you can lower your blood pressure even more. Tips for success  · Start small. Do not try to make dramatic changes to your diet all at once. You might feel that you are missing out on your favorite foods and then be more likely to not follow the plan. Make small changes, and stick with them. Once those changes become habit, add a few more changes. · Try some of the following:  ? Make it a goal to eat a fruit or vegetable at every meal and at snacks. This will make it easy to get the recommended amount of fruits and vegetables each day. ? Try yogurt topped with fruit and nuts for a snack or healthy dessert. ? Add lettuce, tomato, cucumber, and onion to sandwiches. ? Combine a ready-made pizza crust with low-fat mozzarella cheese and lots of vegetable toppings. Try using tomatoes, squash, spinach, broccoli, carrots, cauliflower, and onions. ? Have a variety of cut-up vegetables with a low-fat dip as an appetizer instead of chips and dip. ? Sprinkle sunflower seeds or chopped almonds over salads. Or try adding chopped walnuts or almonds to cooked vegetables.   ? Try some vegetarian meals using beans and peas. Add garbanzo or kidney beans to salads. Make burritos and tacos with mashed rodriguez beans or black beans. Where can you learn more? Go to http://mike-marcos.info/. Enter F649 in the search box to learn more about \"DASH Diet: Care Instructions. \"  Current as of: December 6, 2017  Content Version: 11.8  © 4349-5456 [employer]. Care instructions adapted under license by [employer] (which disclaims liability or warranty for this information). If you have questions about a medical condition or this instruction, always ask your healthcare professional. [address] any warranty or liability for your use of this information.

## 2018-12-26 NOTE — PROGRESS NOTES
1. Have you been to the ER, urgent care clinic since your last visit? Hospitalized since your last visit? No    2. Have you seen or consulted any other health care providers outside of the [address] [place] [address] since your last visit? Include any pap smears or colon screening.  No    Chief Complaint   Patient presents with    Hypertension    Chronic Kidney Disease

## 2018-12-27 NOTE — PROGRESS NOTES
Contacted patient and verified identity using name and date of birth (2- identifiers). Patient advised labs wnl, cont current synthroid dose. Kidney function about the same, not worse. Patient voiced understanding.

## 2018-12-27 NOTE — PROGRESS NOTES
Danie Durbin, 68 y.o.,  male    SUBJECTIVE  Ff-up    HTN-added coreg on last visit, continues to take lisinopril . Pt with h/o CKD. (norvasc too costly)    HL- Taking mevacor without problems. He is a chronic smoker, used to be 2-3 ppd in his younger years, now about 5 cig/ day    Hypothyroidism-better compliance with lt4 replacement. Remote h/o throat ca s/p radiation/chemotherapy 2003    ROS:  See HPI, all others negative        Patient Active Problem List   Diagnosis Code    Essential hypertension I10    Acquired hypothyroidism E03.9    Tobacco use Z72.0    CKD (chronic kidney disease) stage 3, GFR 30-59 ml/min ([facility]) N18.3       Current Outpatient Medications   Medication Sig Dispense Refill    carvedilol (COREG) 6.25 mg tablet Take 1 Tab by mouth two (2) times daily (with meals). 90 Tab 0    levothyroxine (SYNTHROID) 150 mcg tablet TAKE 1 TABLET BY MOUTH ONCE DAILY BEFORE BREAKFAST 90 Tab 2    lovastatin (MEVACOR) 20 mg tablet Take 2 Tabs by mouth nightly. 180 Tab 0    lisinopril (PRINIVIL, ZESTRIL) 10 mg tablet Take  by mouth daily.          No Known Allergies    Past Medical History:   Diagnosis Date    Cancer ([facility] [address] 2012    throat    Gout     Hx of colonoscopy with polypectomy 11/15/2016    rpt 5 years 2021, dr. Daniel Ferrell Hypertension     Pancreatitis 1989       Social History     Socioeconomic History    Marital status:      Spouse name: Not on file    Number of children: Not on file    Years of education: Not on file    Highest education level: Not on file   Social Needs    Financial resource strain: Not on file    Food insecurity - worry: Not on file    Food insecurity - inability: Not on file   [employer] needs - medical: Not on file   [employer] needs - non-medical: Not on file   Occupational History    Not on file   Tobacco Use    Smoking status: Current Every Day Smoker     Packs/day: 0.25     Years: 50.00     Pack years: 12.50    Smokeless tobacco: Never Used   Substance and Sexual Activity    Alcohol use: Yes     Comment: 3    Drug use: No    Sexual activity: Yes     Partners: Female   Other Topics Concern    Not on file   Social History Narrative    Not on file       Family History   Problem Relation Age of Onset    Cancer Father          OBJECTIVE    Physical Exam:     Visit Vitals  /90 (BP 1 Location: Left arm, BP Patient Position: Sitting)   Pulse (!) 58   Temp 98.2 °F (36.8 °C) (Oral)   Resp 15   Ht 5' 6\" (1.676 m)   Wt 162 lb 9.6 oz (73.8 kg)   SpO2 98%   BMI 26.24 kg/m²       General: alert, AA, in no apparent distress or pain  Neck: supple, no adenopathy palpated  CVS: normal rate, regular rhythm, distinct S1 and S2  Lungs:clear to ausculation bilaterally, no crackles, wheezing or rhonchi noted  Extremities: no edema, no cyanosis,  Skin: warm, no lesions, rashes noted  Psych:  mood and affect normal    Results for orders placed or performed during the hospital encounter of 11/15/18   TSH 3RD GENERATION   Result Value Ref Range    TSH 0.68 0.36 - 3.74 uIU/mL         ASSESSMENT/PLAN  Diagnoses and all orders for this visit:     Essential hypertension  improved  Advised DASH diet  cont coreg 6.25 mg   cont lisinopril    Hyperlipidemia  Good range on mevacor, to cont    Acquired hypothyroidism  Commended on better compliance  Currently  On synthroid to 137 mcgs/day  Recheck TSH     Tobacco use  Counseled on cessation  AAA screen negative 2018  pcv 23 completed  Annual flu vaccine     CKD stage 3  Avoid nsaids  Monitoring, cont lisinopril  Optimize cv risk reduction  BMP soon    Follow-up Disposition:  Return in about 3 months (around 3/26/2019), or if symptoms worsen or fail to improve. Patient/guardian understands plan of care. Patient has provided input and agrees with goals. Future labs to be discussed on next visit.

## 2018-12-31 NOTE — TELEPHONE ENCOUNTER
This patient contacted office for the following prescriptions to be filled:    Medication requested :   Requested Prescriptions     Pending Prescriptions Disp Refills    carvedilol (COREG) 6.25 mg tablet 90 Tab 0     Sig: Take 1 Tab by mouth two (2) times daily (with meals).        PCP: [email or web address]  Pharmacy or Print: [facility]   Mail order or Local pharmacy [phone]    Scheduled appointment if not seen by current providers in office:  lov 12/26/18 matilda 3/26/19

## 2019-01-02 RX ORDER — CARVEDILOL 6.25 MG/1
6.25 TABLET ORAL 2 TIMES DAILY WITH MEALS
Qty: 180 TAB | Refills: 3 | Status: SHIPPED | OUTPATIENT
Start: 2019-01-02 | End: 2019-12-18 | Stop reason: SDUPTHER

## 2019-01-22 DIAGNOSIS — I10 ESSENTIAL HYPERTENSION: ICD-10-CM

## 2019-01-22 RX ORDER — LOVASTATIN 20 MG/1
40 TABLET ORAL
Qty: 180 TAB | Refills: 3 | Status: SHIPPED | OUTPATIENT
Start: 2019-01-22 | End: 2019-12-18 | Stop reason: SDUPTHER

## 2019-01-22 RX ORDER — LISINOPRIL 10 MG/1
10 TABLET ORAL DAILY
Qty: 90 TAB | Refills: 2 | Status: SHIPPED | OUTPATIENT
Start: 2019-01-22 | End: 2019-10-21 | Stop reason: SDUPTHER

## 2019-01-22 NOTE — TELEPHONE ENCOUNTER
This patient contacted office for the following prescriptions to be filled:    Medication requested :   Requested Prescriptions     Pending Prescriptions Disp Refills    lovastatin (MEVACOR) 20 mg tablet 180 Tab 0     Sig: Take 2 Tabs by mouth nightly.  lisinopril (PRINIVIL, ZESTRIL) 10 mg tablet       Sig: Take  by mouth daily.      PCP: clemente  Pharmacy or Print: [facility]  Mail order or Local pharmacy [phone]    Scheduled appointment if not seen by current providers in office: lov 12/26/18 ucv 3/26/19

## 2019-03-26 ENCOUNTER — OFFICE VISIT (OUTPATIENT)
Dept: FAMILY MEDICINE CLINIC | Age: 74
End: 2019-03-26

## 2019-03-26 VITALS
BODY MASS INDEX: 26.2 KG/M2 | SYSTOLIC BLOOD PRESSURE: 158 MMHG | OXYGEN SATURATION: 98 % | WEIGHT: 163 LBS | HEART RATE: 54 BPM | RESPIRATION RATE: 16 BRPM | HEIGHT: 66 IN | DIASTOLIC BLOOD PRESSURE: 92 MMHG | TEMPERATURE: 98.1 F

## 2019-03-26 DIAGNOSIS — E78.00 PURE HYPERCHOLESTEROLEMIA: ICD-10-CM

## 2019-03-26 DIAGNOSIS — I10 ESSENTIAL HYPERTENSION: Primary | ICD-10-CM

## 2019-03-26 DIAGNOSIS — E03.9 ACQUIRED HYPOTHYROIDISM: ICD-10-CM

## 2019-03-26 DIAGNOSIS — N18.30 CKD (CHRONIC KIDNEY DISEASE) STAGE 3, GFR 30-59 ML/MIN (HCC): ICD-10-CM

## 2019-03-26 DIAGNOSIS — Z72.0 TOBACCO USE: ICD-10-CM

## 2019-03-26 RX ORDER — HYDRALAZINE HYDROCHLORIDE 25 MG/1
25 TABLET, FILM COATED ORAL 3 TIMES DAILY
Qty: 270 TAB | Refills: 0 | Status: SHIPPED | OUTPATIENT
Start: 2019-03-26 | End: 2019-12-18 | Stop reason: SDUPTHER

## 2019-03-26 NOTE — PROGRESS NOTES
Jared Diez, 68 y.o.,  male    SUBJECTIVE  Ff-up    HTN-home readings 150/90's. Added coreg on last visit, continues to take lisinopril . Pt with h/o CKD. (norvasc too costly)    HL- Taking mevacor without problems. He is a chronic smoker, about 5 cig/ day. aaa screen neg    Hypothyroidism-better compliance with lt4 replacement. Remote h/o throat ca s/p radiation/chemotherapy 2003    ROS:  See HPI, all others negative        Patient Active Problem List   Diagnosis Code    Essential hypertension I10    Acquired hypothyroidism E03.9    Tobacco use Z72.0    CKD (chronic kidney disease) stage 3, GFR 30-59 ml/min ([facility] N18.3       Current Outpatient Medications   Medication Sig Dispense Refill    hydrALAZINE (APRESOLINE) 25 mg tablet Take 1 Tab by mouth three (3) times daily. 270 Tab 0    lovastatin (MEVACOR) 20 mg tablet Take 2 Tabs by mouth nightly. 180 Tab 3    lisinopril (PRINIVIL, ZESTRIL) 10 mg tablet Take 1 Tab by mouth daily. 90 Tab 2    carvedilol (COREG) 6.25 mg tablet Take 1 Tab by mouth two (2) times daily (with meals).  180 Tab 3    levothyroxine (SYNTHROID) 150 mcg tablet TAKE 1 TABLET BY MOUTH ONCE DAILY BEFORE BREAKFAST 90 Tab 2       No Known Allergies    Past Medical History:   Diagnosis Date    [facility] [address] 2012    throat    Gout     Hx of colonoscopy with polypectomy 11/15/2016    rpt 5 years 2021, dr. Garcia Pump    Hypertension     Pancreatitis 1989       Social History     Socioeconomic History    Marital status:      Spouse name: Not on file    Number of children: Not on file    Years of education: Not on file    Highest education level: Not on file   Occupational History    Not on file   Social Needs    Financial resource strain: Not on file    Food insecurity:     Worry: Not on file     Inability: Not on file    Transportation needs:     Medical: Not on file     Non-medical: Not on file   Tobacco Use    Smoking status: Current Every Day Smoker Packs/day: 0.25     Years: 50.00     Pack years: 12.50    Smokeless tobacco: Never Used   Substance and Sexual Activity    Alcohol use: Yes     Comment: 3    Drug use: No    Sexual activity: Yes     Partners: Female   Lifestyle    Physical activity:     Days per week: Not on file     Minutes per session: Not on file    Stress: Not on file   Relationships    Social connections:     Talks on phone: Not on file     Gets together: Not on file     Attends Rastafarian service: Not on file     Active member of club or organization: Not on file     Attends meetings of clubs or organizations: Not on file     Relationship status: Not on file    Intimate partner violence:     Fear of current or ex partner: Not on file     Emotionally abused: Not on file     Physically abused: Not on file     Forced sexual activity: Not on file   Other Topics Concern    Not on file   Social History Narrative    Not on file       Family History   Problem Relation Age of Onset    Cancer Father          OBJECTIVE    Physical Exam:     Visit Vitals  BP (!) 158/92 (BP 1 Location: Left arm, BP Patient Position: Sitting)   Pulse (!) 54   Temp 98.1 °F (36.7 °C) (Oral)   Resp 16   Ht 5' 6\" (1.676 m)   Wt 163 lb (73.9 kg)   SpO2 98%   BMI 26.31 kg/m²       General: alert, AA, in no apparent distress or pain  Neck: supple, no adenopathy palpated  CVS: normal rate, regular rhythm, distinct S1 and S2  Lungs:clear to ausculation bilaterally, no crackles, wheezing or rhonchi noted  Extremities: no edema, no cyanosis,  Skin: warm, no lesions, rashes noted  Psych:  mood and affect normal        ASSESSMENT/PLAN  Diagnoses and all orders for this visit:     Essential hypertension  Needs better control  Advised DASH diet  Start hydralazine 25 mg tid  cont coreg 6.25 mg   cont lisinopril 10   Check bmp prior to next visit    Hyperlipidemia  Good range on mevacor, to cont  Update 7/19    Acquired hypothyroidism  Commended on better compliance  Currently  On synthroid to 137 mcgs/day  Recheck TSH prior to next visit     Tobacco use  Counseled on cessation  AAA screen negative 2018  pcv 23 completed  Annual flu vaccine     CKD stage 3  Avoid nsaids  Monitoring, cont lisinopril  Optimize cv risk reduction  BMP  Prior to next visit    Follow-up and Dispositions    · Return in about 1 month (around 4/23/2019), or if symptoms worsen or fail to improve. Patient/guardian understands plan of care. Patient has provided input and agrees with goals. Future labs to be discussed on next visit.

## 2019-03-26 NOTE — PATIENT INSTRUCTIONS
DASH Diet: Care Instructions  Your Care Instructions    The DASH diet is an eating plan that can help lower your blood pressure. DASH stands for Dietary Approaches to Stop Hypertension. Hypertension is high blood pressure. The DASH diet focuses on eating foods that are high in calcium, potassium, and magnesium. These nutrients can lower blood pressure. The foods that are highest in these nutrients are fruits, vegetables, low-fat dairy products, nuts, seeds, and legumes. But taking calcium, potassium, and magnesium supplements instead of eating foods that are high in those nutrients does not have the same effect. The DASH diet also includes whole grains, fish, and poultry. The DASH diet is one of several lifestyle changes your doctor may recommend to lower your high blood pressure. Your doctor may also want you to decrease the amount of sodium in your diet. Lowering sodium while following the DASH diet can lower blood pressure even further than just the DASH diet alone. Follow-up care is a key part of your treatment and safety. Be sure to make and go to all appointments, and call your doctor if you are having problems. It's also a good idea to know your test results and keep a list of the medicines you take. How can you care for yourself at home? Following the DASH diet  · Eat 4 to 5 servings of fruit each day. A serving is 1 medium-sized piece of fruit, ½ cup chopped or canned fruit, 1/4 cup dried fruit, or 4 ounces (½ cup) of fruit juice. Choose fruit more often than fruit juice. · Eat 4 to 5 servings of vegetables each day. A serving is 1 cup of lettuce or raw leafy vegetables, ½ cup of chopped or cooked vegetables, or 4 ounces (½ cup) of vegetable juice. Choose vegetables more often than vegetable juice. · Get 2 to 3 servings of low-fat and fat-free dairy each day. A serving is 8 ounces of milk, 1 cup of yogurt, or 1 ½ ounces of cheese. · Eat 6 to 8 servings of grains each day.  A serving is 1 slice of bread, 1 ounce of dry cereal, or ½ cup of cooked rice, pasta, or cooked cereal. Try to choose whole-grain products as much as possible. · Limit lean meat, poultry, and fish to 2 servings each day. A serving is 3 ounces, about the size of a deck of cards. · Eat 4 to 5 servings of nuts, seeds, and legumes (cooked dried beans, lentils, and split peas) each week. A serving is 1/3 cup of nuts, 2 tablespoons of seeds, or ½ cup of cooked beans or peas. · Limit fats and oils to 2 to 3 servings each day. A serving is 1 teaspoon of vegetable oil or 2 tablespoons of salad dressing. · Limit sweets and added sugars to 5 servings or less a week. A serving is 1 tablespoon jelly or jam, ½ cup sorbet, or 1 cup of lemonade. · Eat less than 2,300 milligrams (mg) of sodium a day. If you limit your sodium to 1,500 mg a day, you can lower your blood pressure even more. Tips for success  · Start small. Do not try to make dramatic changes to your diet all at once. You might feel that you are missing out on your favorite foods and then be more likely to not follow the plan. Make small changes, and stick with them. Once those changes become habit, add a few more changes. · Try some of the following:  ? Make it a goal to eat a fruit or vegetable at every meal and at snacks. This will make it easy to get the recommended amount of fruits and vegetables each day. ? Try yogurt topped with fruit and nuts for a snack or healthy dessert. ? Add lettuce, tomato, cucumber, and onion to sandwiches. ? Combine a ready-made pizza crust with low-fat mozzarella cheese and lots of vegetable toppings. Try using tomatoes, squash, spinach, broccoli, carrots, cauliflower, and onions. ? Have a variety of cut-up vegetables with a low-fat dip as an appetizer instead of chips and dip. ? Sprinkle sunflower seeds or chopped almonds over salads. Or try adding chopped walnuts or almonds to cooked vegetables.   ? Try some vegetarian meals using beans and peas. Add garbanzo or kidney beans to salads. Make burritos and tacos with mashed rodriguez beans or black beans. Where can you learn more? Go to http://mike-marcos.info/. Enter P416 in the search box to learn more about \"DASH Diet: Care Instructions. \"  Current as of: July 22, 2018  Content Version: 11.9  © 0780-7866 [employer]. Care instructions adapted under license by [employer] (which disclaims liability or warranty for this information). If you have questions about a medical condition or this instruction, always ask your healthcare professional. [address] any warranty or liability for your use of this information.

## 2019-04-22 ENCOUNTER — HOSPITAL ENCOUNTER (OUTPATIENT)
Dept: LAB | Age: 74
Discharge: HOME | End: 2019-04-22
Payer: MEDICARE

## 2019-04-22 DIAGNOSIS — E03.9 ACQUIRED HYPOTHYROIDISM: ICD-10-CM

## 2019-04-22 DIAGNOSIS — I10 ESSENTIAL HYPERTENSION: ICD-10-CM

## 2019-04-22 LAB
ANION GAP SERPL CALC-SCNC: 6 MMOL/L (ref 3–18)
BUN SERPL-MCNC: 11 MG/DL (ref 7–18)
BUN/CREAT SERPL: 8 (ref 12–20)
CALCIUM SERPL-MCNC: 8.7 MG/DL (ref 8.5–10.1)
CHLORIDE SERPL-SCNC: 111 MMOL/L (ref 100–108)
CO2 SERPL-SCNC: 27 MMOL/L (ref 21–32)
CREAT SERPL-MCNC: 1.35 MG/DL (ref 0.6–1.3)
GLUCOSE SERPL-MCNC: 80 MG/DL (ref 74–99)
POTASSIUM SERPL-SCNC: 3.4 MMOL/L (ref 3.5–5.5)
SODIUM SERPL-SCNC: 144 MMOL/L (ref 136–145)
TSH SERPL DL<=0.05 MIU/L-ACNC: 8.35 UIU/ML (ref 0.36–3.74)

## 2019-04-22 PROCEDURE — 84443 ASSAY THYROID STIM HORMONE: CPT

## 2019-04-22 PROCEDURE — 36415 COLL VENOUS BLD VENIPUNCTURE: CPT

## 2019-04-22 PROCEDURE — 80048 BASIC METABOLIC PNL TOTAL CA: CPT

## 2019-05-03 ENCOUNTER — OFFICE VISIT (OUTPATIENT)
Dept: FAMILY MEDICINE CLINIC | Age: 74
End: 2019-05-03

## 2019-05-03 VITALS
HEART RATE: 56 BPM | RESPIRATION RATE: 16 BRPM | SYSTOLIC BLOOD PRESSURE: 134 MMHG | TEMPERATURE: 98 F | HEIGHT: 66 IN | OXYGEN SATURATION: 97 % | BODY MASS INDEX: 25.23 KG/M2 | WEIGHT: 157 LBS | DIASTOLIC BLOOD PRESSURE: 82 MMHG

## 2019-05-03 DIAGNOSIS — E03.9 ACQUIRED HYPOTHYROIDISM: ICD-10-CM

## 2019-05-03 DIAGNOSIS — Z00.00 MEDICARE ANNUAL WELLNESS VISIT, SUBSEQUENT: Primary | ICD-10-CM

## 2019-05-03 DIAGNOSIS — I10 ESSENTIAL HYPERTENSION: ICD-10-CM

## 2019-05-03 DIAGNOSIS — Z72.0 TOBACCO USE: ICD-10-CM

## 2019-05-03 DIAGNOSIS — N18.30 CKD (CHRONIC KIDNEY DISEASE) STAGE 3, GFR 30-59 ML/MIN (HCC): ICD-10-CM

## 2019-05-03 DIAGNOSIS — Z71.89 ADVANCE CARE PLANNING: ICD-10-CM

## 2019-05-03 RX ORDER — LEVOTHYROXINE SODIUM 25 UG/1
25 TABLET ORAL
Qty: 60 TAB | Refills: 0 | Status: SHIPPED | OUTPATIENT
Start: 2019-05-03 | End: 2019-06-27 | Stop reason: DRUGHIGH

## 2019-05-03 NOTE — PATIENT INSTRUCTIONS
Medicare Wellness Visit, Male The best way to live healthy is to have a lifestyle where you eat a well-balanced diet, exercise regularly, limit alcohol use, and quit all forms of tobacco/nicotine, if applicable. Regular preventive services are another way to keep healthy. Preventive services (vaccines, screening tests, monitoring & exams) can help personalize your care plan, which helps you manage your own care. Screening tests can find health problems at the earliest stages, when they are easiest to treat. [facility] follows the current, evidence-based guidelines published by the [facility] ([facility]) when recommending preventive services for our patients. Because we follow these guidelines, sometimes recommendations change over time as research supports it. (For example, a prostate screening blood test is no longer routinely recommended for men with no symptoms.) Of course, you and your doctor may decide to screen more often for some diseases, based on your risk and co-morbidities (chronic disease you are already diagnosed with). Preventive services for you include: - Medicare offers their members a free annual wellness visit, which is time for you and your primary care provider to discuss and plan for your preventive service needs. Take advantage of this benefit every year! 
-All adults over age 72 should receive the recommended pneumonia vaccines. Current USPSTF guidelines recommend a series of two vaccines for the best pneumonia protection.  
-All adults should have a flu vaccine yearly and an ECG.  All adults age 61 and older should receive a shingles vaccine once in their lifetime.   
-All adults age 38-68 who are overweight should have a diabetes screening test once every three years.  
-Other screening tests & preventive services for persons with diabetes include: an eye exam to screen for diabetic retinopathy, a kidney function test, a foot exam, and stricter control over your cholesterol.  
-Cardiovascular screening for adults with routine risk involves an electrocardiogram (ECG) at intervals determined by the provider.  
-Colorectal cancer screening should be done for adults age 54-65 with no increased risk factors for colorectal cancer. There are a number of acceptable methods of screening for this type of cancer. Each test has its own benefits and drawbacks. Discuss with your provider what is most appropriate for you during your annual wellness visit. The different tests include: colonoscopy (considered the best screening method), a fecal occult blood test, a fecal DNA test, and sigmoidoscopy. 
-All adults born between [facility] should be screened once for Hepatitis C. 
-An Abdominal Aortic Aneurysm (AAA) Screening is recommended for men age 73-68 who has ever smoked in their lifetime. Here is a list of your current Health Maintenance items (your personalized list of preventive services) with a due date: 
Health Maintenance Due Topic Date Due  
 DTaP/Tdap/Td  (1 - Tdap) 11/08/1966  Shingles Vaccine (1 of 2) 11/08/1995 [facility] Annual Well Visit  04/18/2019

## 2019-05-03 NOTE — PROGRESS NOTES
1. Have you been to the ER, urgent care clinic since your last visit? Hospitalized since your last visit? No 
 
2. Have you seen or consulted any other health care providers outside of the [address] [place] [address] since your last visit? Include any pap smears or colon screening. No 
 
This is the Subsequent Medicare Annual Wellness Exam, performed 12 months or more after the Initial AWV or the last Subsequent AWV I have reviewed the patient's medical history in detail and updated the computerized patient record. History Past Medical History:  
Diagnosis Date  Cancer ([facility] [address] 2012  
 throat  Gout  Hx of colonoscopy with polypectomy 11/15/2016  
 rpt 5 years 2021, dr. Ivelisse Johnson  Hypertension  Pancreatitis 1989 Past Surgical History:  
Procedure Laterality Date [address] UNLISTED  HX COLONOSCOPY  11/15/2016  
 repeat 5 years 2021, Dr Christofer Smalls polyp Current Outpatient Medications Medication Sig Dispense Refill  hydrALAZINE (APRESOLINE) 25 mg tablet Take 1 Tab by mouth three (3) times daily. 270 Tab 0  
 lovastatin (MEVACOR) 20 mg tablet Take 2 Tabs by mouth nightly. 180 Tab 3  
 lisinopril (PRINIVIL, ZESTRIL) 10 mg tablet Take 1 Tab by mouth daily. 90 Tab 2  carvedilol (COREG) 6.25 mg tablet Take 1 Tab by mouth two (2) times daily (with meals). 180 Tab 3  
 levothyroxine (SYNTHROID) 150 mcg tablet TAKE 1 TABLET BY MOUTH ONCE DAILY BEFORE BREAKFAST 90 Tab 2 No Known Allergies Family History Problem Relation Age of Onset  Cancer Father Social History Tobacco Use  Smoking status: Current Every Day Smoker Packs/day: 0.25 Years: 50.00 Pack years: 12.50  Smokeless tobacco: Never Used Substance Use Topics  Alcohol use: Yes Comment: 3 Patient Active Problem List  
Diagnosis Code  Essential hypertension I10  
 Acquired hypothyroidism E03.9  Tobacco use Z72.0  CKD (chronic kidney disease) stage 3, GFR 30-59 ml/min ([facility]) N18.3 Depression Risk Factor Screening:  
 
3 most recent PHQ Screens 5/3/2019 Little interest or pleasure in doing things Not at all Feeling down, depressed, irritable, or hopeless Not at all Total Score PHQ 2 0 Alcohol Risk Factor Screening: You do not drink alcohol or very rarely. Functional Ability and Level of Safety:  
Hearing Loss Hearing is good. Activities of Daily Living The home contains: no safety equipment. Patient does total self care Fall Risk Fall Risk Assessment, last 12 mths 5/3/2019 Able to walk? Yes Fall in past 12 months? No  
 
 
Abuse Screen Patient is not abused Cognitive Screening Evaluation of Cognitive Function: 
Has your family/caregiver stated any concerns about your memory: no 
 
 
Patient Care Team  
Patient Care Team: 
Donny Kelly MD as PCP - General (Family Practice) Lorel Goldberg, MD (Gastroenterology) DEBRA Kim (Optometry) Assessment/Plan Education and counseling provided: 
Are appropriate based on today's review and evaluation End-of-Life planning (with patient's consent)- discussed, provided form Pneumococcal Vaccine- 13 and 23 completed Influenza Vaccine- annually Colorectal cancer screening tests- 2016 Cardiovascular screening blood test- utd Screening for glaucoma- 4/2018 Diagnoses and all orders for this visit: 
 
1. Medicare annual wellness visit, subsequent Health Maintenance Due Topic Date Due  
 DTaP/Tdap/Td series (1 - Tdap) 11/08/1966  Shingrix Vaccine Age 50> (1 of 2) 11/08/1995  MEDICARE YEARLY EXAM  04/18/2019 Miguel Villalbaudder, 68 y.o.,  male SUBJECTIVE Ff-up HTN-added hydralazine on last visit, reports improved home readings. Continues to take coreg and lisinopril . Pt with h/o CKD. (norvasc too costly) HL- Taking mevacor without problems. He is a chronic smoker, about 5 cig/ day. aaa screen neg Hypothyroidism-reports  compliance with lt4 replacement. Reviewed labs elevated TSH Remote h/o throat ca s/p radiation/chemotherapy 2003 ROS: 
See HPI, all others negative Patient Active Problem List  
Diagnosis Code  Essential hypertension I10  
 Acquired hypothyroidism E03.9  Tobacco use Z72.0  
 CKD (chronic kidney disease) stage 3, GFR 30-59 ml/min (H[facility]) N18.3 Current Outpatient Medications Medication Sig Dispense Refill  levothyroxine (SYNTHROID) 25 mcg tablet Take 1 Tab by mouth Daily (before breakfast). 60 Tab 0  
 hydrALAZINE (APRESOLINE) 25 mg tablet Take 1 Tab by mouth three (3) times daily. 270 Tab 0  
 lovastatin (MEVACOR) 20 mg tablet Take 2 Tabs by mouth nightly. 180 Tab 3  
 lisinopril (PRINIVIL, ZESTRIL) 10 mg tablet Take 1 Tab by mouth daily. 90 Tab 2  carvedilol (COREG) 6.25 mg tablet Take 1 Tab by mouth two (2) times daily (with meals). 180 Tab 3  
 levothyroxine (SYNTHROID) 150 mcg tablet TAKE 1 TABLET BY MOUTH ONCE DAILY BEFORE BREAKFAST 90 Tab 2 No Known Allergies Past Medical History:  
Diagnosis Date  Cancer ([facility] [address] 2012  
 throat  Gout  Hx of colonoscopy with polypectomy 11/15/2016  
 rpt 5 years 2021, dr. Alanis Nearing  Hypertension  Pancreatitis 1989 Social History Socioeconomic History  Marital status:  Spouse name: Not on file  Number of children: Not on file  Years of education: Not on file  Highest education level: Not on file Occupational History  Not on file Social Needs  Financial resource strain: Not on file  Food insecurity:  
  Worry: Not on file Inability: Not on file  Transportation needs:  
  Medical: Not on file Non-medical: Not on file Tobacco Use  Smoking status: Current Every Day Smoker Packs/day: 0.25 Years: 50.00 Pack years: 12.50  Smokeless tobacco: Never Used Substance and Sexual Activity  Alcohol use: Yes Comment: 3  
 Drug use: No  
 Sexual activity: Yes  
  Partners: Female Lifestyle  Physical activity:  
  Days per week: Not on file Minutes per session: Not on file  Stress: Not on file Relationships  Social connections:  
  Talks on phone: Not on file Gets together: Not on file Attends Congregational service: Not on file Active member of club or organization: Not on file Attends meetings of clubs or organizations: Not on file Relationship status: Not on file  Intimate partner violence:  
  Fear of current or ex partner: Not on file Emotionally abused: Not on file Physically abused: Not on file Forced sexual activity: Not on file Other Topics Concern  Not on file Social History Narrative  Not on file Family History Problem Relation Age of Onset  Cancer Father OBJECTIVE Physical Exam:  
 
Visit Vitals /82 (BP 1 Location: Left arm, BP Patient Position: Sitting) Pulse (!) 56 Temp 98 °F (36.7 °C) (Oral) Resp 16 Ht 5' 6\" (1.676 m) Wt 157 lb (71.2 kg) SpO2 97% BMI 25.34 kg/m² General: alert, AA, in no apparent distress or pain Neck: supple, no adenopathy palpated CVS: normal rate, regular rhythm, distinct S1 and S2 Lungs:clear to ausculation bilaterally, no crackles, wheezing or rhonchi noted Extremities: no edema, no cyanosis, 
Skin: warm, no lesions, rashes noted Psych:  mood and affect normal 
 
 
 
ASSESSMENT/PLAN Diagnoses and all orders for this visit: 
 
 Essential hypertension Improved, now controlled Advised DASH diet Cont hydralazine 25 mg tid 
cont coreg 6.25 mg 
 cont lisinopril 10 Recheck BMP/TSH in 6 weeks. Hyperlipidemia Good range on mevacor, to cont Update 7/19 Acquired hypothyroidism Reports good compliance Will increase synthroid to 175 mcgs/day (150 + 25 mg pills) Tobacco use Counseled on cessation AAA screen negative 2018 
pcv 23 completed Annual flu vaccine CKD stage 3 Avoid nsaids Monitoring, cont lisinopril Optimize cv risk reduction Follow-up and Dispositions · Return in about 3 months (around 8/3/2019), or if symptoms worsen or fail to improve. Patient/guardian understands plan of care. Patient has provided input and agrees with goals. Future labs to be discussed on next visit.

## 2019-05-03 NOTE — ACP (ADVANCE CARE PLANNING)
Advance Care Planning (ACP) Provider Note - Comprehensive     Date of ACP Conversation: 05/03/19  Persons included in Conversation:  patient  Length of ACP Conversation in minutes:  16 minutes    Authorized Decision Maker (if patient is incapable of making informed decisions): This person is:  has yet to appoint, thinking wife and one of his daughters            General ACP for ALL Patients with Decision Making Capacity:   Importance of advance care planning, including choosing a healthcare agent to communicate patient's healthcare decisions if patient lost the ability to make decisions, such as after a sudden illness or accident  Understanding of the healthcare agent role was assessed and information provided  Exploration of values, goals, and preferences if recovery is not expected, even with continued medical treatment in the event of: Imminent death  Severe, permanent brain injury  \"In these circumstances, what matters most to you? \"  Care focused more on comfort or quality of life.       Interventions Provided:  Recommended completion of Advance Directive form after review of ACP materials and conversation with prospective healthcare agent   Recommended communicating the plan and making copies for the healthcare agent, personal physician, and others as appropriate (e.g., health system)  Recommended review of completed ACP document annually or upon change in health status

## 2019-06-17 ENCOUNTER — HOSPITAL ENCOUNTER (OUTPATIENT)
Dept: LAB | Age: 74
Discharge: HOME | End: 2019-06-17
Payer: MEDICARE

## 2019-06-17 DIAGNOSIS — E03.9 ACQUIRED HYPOTHYROIDISM: ICD-10-CM

## 2019-06-17 DIAGNOSIS — I10 ESSENTIAL HYPERTENSION: ICD-10-CM

## 2019-06-17 DIAGNOSIS — N18.30 CKD (CHRONIC KIDNEY DISEASE) STAGE 3, GFR 30-59 ML/MIN (HCC): ICD-10-CM

## 2019-06-17 LAB
ANION GAP SERPL CALC-SCNC: 6 MMOL/L (ref 3–18)
BUN SERPL-MCNC: 11 MG/DL (ref 7–18)
BUN/CREAT SERPL: 8 (ref 12–20)
CALCIUM SERPL-MCNC: 8.6 MG/DL (ref 8.5–10.1)
CHLORIDE SERPL-SCNC: 112 MMOL/L (ref 100–108)
CO2 SERPL-SCNC: 27 MMOL/L (ref 21–32)
CREAT SERPL-MCNC: 1.31 MG/DL (ref 0.6–1.3)
GLUCOSE SERPL-MCNC: 83 MG/DL (ref 74–99)
POTASSIUM SERPL-SCNC: 3.3 MMOL/L (ref 3.5–5.5)
SODIUM SERPL-SCNC: 145 MMOL/L (ref 136–145)
TSH SERPL DL<=0.05 MIU/L-ACNC: 0.24 UIU/ML (ref 0.36–3.74)

## 2019-06-17 PROCEDURE — 80048 BASIC METABOLIC PNL TOTAL CA: CPT

## 2019-06-17 PROCEDURE — 84443 ASSAY THYROID STIM HORMONE: CPT

## 2019-06-17 PROCEDURE — 36415 COLL VENOUS BLD VENIPUNCTURE: CPT

## 2019-06-20 NOTE — PROGRESS NOTES
Appears to be hyperthyroid. Go back to taking 150 mcgs synthroid/day ( no additional 25 mcg/day tab)  does he need more refills?   Kidney function test is about the same  pls notify pt

## 2019-06-26 NOTE — PROGRESS NOTES
Patient identified with 2 identifiers (name and ). Patient aware of Appears to be hyperthyroid. Go back to taking 150 mcgs synthroid/day ( no additional 25 mcg/day tab). Patient aware kidney function is about the same.  Patient needs refill of the 150 mcg to go to [facility]

## 2019-06-27 RX ORDER — LEVOTHYROXINE SODIUM 150 UG/1
TABLET ORAL
Qty: 90 TAB | Refills: 0 | Status: SHIPPED | OUTPATIENT
Start: 2019-06-27 | End: 2019-10-21 | Stop reason: SDUPTHER

## 2019-08-05 ENCOUNTER — HOSPITAL ENCOUNTER (OUTPATIENT)
Dept: LAB | Age: 74
Discharge: HOME | End: 2019-08-05
Payer: MEDICARE

## 2019-08-05 ENCOUNTER — OFFICE VISIT (OUTPATIENT)
Dept: FAMILY MEDICINE CLINIC | Age: 74
End: 2019-08-05

## 2019-08-05 VITALS
WEIGHT: 153 LBS | HEIGHT: 66 IN | SYSTOLIC BLOOD PRESSURE: 126 MMHG | DIASTOLIC BLOOD PRESSURE: 60 MMHG | BODY MASS INDEX: 24.59 KG/M2 | TEMPERATURE: 98.4 F | HEART RATE: 61 BPM | OXYGEN SATURATION: 98 % | RESPIRATION RATE: 16 BRPM

## 2019-08-05 DIAGNOSIS — E03.9 ACQUIRED HYPOTHYROIDISM: ICD-10-CM

## 2019-08-05 DIAGNOSIS — R31.9 HEMATURIA, UNSPECIFIED TYPE: ICD-10-CM

## 2019-08-05 DIAGNOSIS — Z72.0 TOBACCO USE: ICD-10-CM

## 2019-08-05 DIAGNOSIS — N18.30 CKD (CHRONIC KIDNEY DISEASE) STAGE 3, GFR 30-59 ML/MIN (HCC): ICD-10-CM

## 2019-08-05 DIAGNOSIS — I10 ESSENTIAL HYPERTENSION: Primary | ICD-10-CM

## 2019-08-05 DIAGNOSIS — R35.0 INCREASED URINARY FREQUENCY: ICD-10-CM

## 2019-08-05 LAB
BILIRUB UR QL STRIP: NEGATIVE
GLUCOSE UR-MCNC: NEGATIVE MG/DL
KETONES P FAST UR STRIP-MCNC: NEGATIVE MG/DL
PH UR STRIP: 5.5 [PH] (ref 4.6–8)
PROT UR QL STRIP: ABNORMAL
SP GR UR STRIP: 1.01 (ref 1–1.03)
UA UROBILINOGEN AMB POC: ABNORMAL (ref 0.2–1)
URINALYSIS CLARITY POC: ABNORMAL
URINALYSIS COLOR POC: YELLOW
URINE BLOOD POC: ABNORMAL
URINE LEUKOCYTES POC: ABNORMAL
URINE NITRITES POC: NEGATIVE

## 2019-08-05 PROCEDURE — 87077 CULTURE AEROBIC IDENTIFY: CPT

## 2019-08-05 PROCEDURE — 87086 URINE CULTURE/COLONY COUNT: CPT

## 2019-08-05 PROCEDURE — 87186 SC STD MICRODIL/AGAR DIL: CPT

## 2019-08-05 RX ORDER — SULFAMETHOXAZOLE AND TRIMETHOPRIM 800; 160 MG/1; MG/1
1 TABLET ORAL 2 TIMES DAILY
Qty: 6 TAB | Refills: 0 | Status: SHIPPED | OUTPATIENT
Start: 2019-08-05 | End: 2019-08-08

## 2019-08-05 NOTE — PATIENT INSTRUCTIONS
DASH Diet: Care Instructions  Your Care Instructions    The DASH diet is an eating plan that can help lower your blood pressure. DASH stands for Dietary Approaches to Stop Hypertension. Hypertension is high blood pressure. The DASH diet focuses on eating foods that are high in calcium, potassium, and magnesium. These nutrients can lower blood pressure. The foods that are highest in these nutrients are fruits, vegetables, low-fat dairy products, nuts, seeds, and legumes. But taking calcium, potassium, and magnesium supplements instead of eating foods that are high in those nutrients does not have the same effect. The DASH diet also includes whole grains, fish, and poultry. The DASH diet is one of several lifestyle changes your doctor may recommend to lower your high blood pressure. Your doctor may also want you to decrease the amount of sodium in your diet. Lowering sodium while following the DASH diet can lower blood pressure even further than just the DASH diet alone. Follow-up care is a key part of your treatment and safety. Be sure to make and go to all appointments, and call your doctor if you are having problems. It's also a good idea to know your test results and keep a list of the medicines you take. How can you care for yourself at home? Following the DASH diet  · Eat 4 to 5 servings of fruit each day. A serving is 1 medium-sized piece of fruit, ½ cup chopped or canned fruit, 1/4 cup dried fruit, or 4 ounces (½ cup) of fruit juice. Choose fruit more often than fruit juice. · Eat 4 to 5 servings of vegetables each day. A serving is 1 cup of lettuce or raw leafy vegetables, ½ cup of chopped or cooked vegetables, or 4 ounces (½ cup) of vegetable juice. Choose vegetables more often than vegetable juice. · Get 2 to 3 servings of low-fat and fat-free dairy each day. A serving is 8 ounces of milk, 1 cup of yogurt, or 1 ½ ounces of cheese. · Eat 6 to 8 servings of grains each day.  A serving is 1 slice of bread, 1 ounce of dry cereal, or ½ cup of cooked rice, pasta, or cooked cereal. Try to choose whole-grain products as much as possible. · Limit lean meat, poultry, and fish to 2 servings each day. A serving is 3 ounces, about the size of a deck of cards. · Eat 4 to 5 servings of nuts, seeds, and legumes (cooked dried beans, lentils, and split peas) each week. A serving is 1/3 cup of nuts, 2 tablespoons of seeds, or ½ cup of cooked beans or peas. · Limit fats and oils to 2 to 3 servings each day. A serving is 1 teaspoon of vegetable oil or 2 tablespoons of salad dressing. · Limit sweets and added sugars to 5 servings or less a week. A serving is 1 tablespoon jelly or jam, ½ cup sorbet, or 1 cup of lemonade. · Eat less than 2,300 milligrams (mg) of sodium a day. If you limit your sodium to 1,500 mg a day, you can lower your blood pressure even more. Tips for success  · Start small. Do not try to make dramatic changes to your diet all at once. You might feel that you are missing out on your favorite foods and then be more likely to not follow the plan. Make small changes, and stick with them. Once those changes become habit, add a few more changes. · Try some of the following:  ? Make it a goal to eat a fruit or vegetable at every meal and at snacks. This will make it easy to get the recommended amount of fruits and vegetables each day. ? Try yogurt topped with fruit and nuts for a snack or healthy dessert. ? Add lettuce, tomato, cucumber, and onion to sandwiches. ? Combine a ready-made pizza crust with low-fat mozzarella cheese and lots of vegetable toppings. Try using tomatoes, squash, spinach, broccoli, carrots, cauliflower, and onions. ? Have a variety of cut-up vegetables with a low-fat dip as an appetizer instead of chips and dip. ? Sprinkle sunflower seeds or chopped almonds over salads. Or try adding chopped walnuts or almonds to cooked vegetables.   ? Try some vegetarian meals using beans and peas. Add garbanzo or kidney beans to salads. Make burritos and tacos with mashed rodriguez beans or black beans. Where can you learn more? Go to http://mike-marcos.info/. Enter O348 in the search box to learn more about \"DASH Diet: Care Instructions. \"  Current as of: July 22, 2018  Content Version: 12.1  © 3350-2898 [employer]. Care instructions adapted under license by [employer] (which disclaims liability or warranty for this information). If you have questions about a medical condition or this instruction, always ask your healthcare professional. [address] any warranty or liability for your use of this information.

## 2019-08-05 NOTE — PROGRESS NOTES
Chief Complaint   Patient presents with    Hypertension    Cholesterol Problem    Thyroid Problem    Chronic Kidney Disease    Results    Urinary Frequency     started yesterday    Dysuria     started yesterday     1. Have you been to the ER, urgent care clinic since your last visit? Hospitalized since your last visit? No    2. Have you seen or consulted any other health care providers outside of the [address] [place] [address] since your last visit? Include any pap smears or colon screening.  No

## 2019-08-05 NOTE — PROGRESS NOTES
Chante Lozanona, 68 y.o.,  male    SUBJECTIVE  Ff-up    HTN with CKD 3-. Taking medications without problems. (norvasc too costly)    HL- Taking mevacor without problems. He is a chronic smoker, about 5 cig/ day. aaa screen neg (2018)    Hypothyroidism-recent reduction of synthroid dose to 150 mcgs/day with low TSH. Remote h/o throat ca s/p radiation/chemotherapy 2003    C/o urinary freq, urgency, dysuria, incontinence past 3 days. Denies fever, penile discharge. ROS:  See HPI, all others negative        Patient Active Problem List   Diagnosis Code    Essential hypertension I10    Acquired hypothyroidism E03.9    Tobacco use Z72.0    CKD (chronic kidney disease) stage 3, GFR 30-59 ml/min ([facility]) N18.3       Current Outpatient Medications   Medication Sig Dispense Refill    levothyroxine (SYNTHROID) 25 mcg tablet Take 1 Tab by mouth Daily (before breakfast). 60 Tab 0    hydrALAZINE (APRESOLINE) 25 mg tablet Take 1 Tab by mouth three (3) times daily. 270 Tab 0    lovastatin (MEVACOR) 20 mg tablet Take 2 Tabs by mouth nightly. 180 Tab 3    lisinopril (PRINIVIL, ZESTRIL) 10 mg tablet Take 1 Tab by mouth daily. 90 Tab 2    carvedilol (COREG) 6.25 mg tablet Take 1 Tab by mouth two (2) times daily (with meals).  180 Tab 3    levothyroxine (SYNTHROID) 150 mcg tablet TAKE 1 TABLET BY MOUTH ONCE DAILY BEFORE BREAKFAST 90 Tab 2       No Known Allergies    Past Medical History:   Diagnosis Date    Cancer ([facility] [address] 2012    throat    Gout     Hx of colonoscopy with polypectomy 11/15/2016    rpt 5 years 2021, dr. Amarilys Samuels    Hypertension     Pancreatitis 1989       Social History     Socioeconomic History    Marital status:      Spouse name: Not on file    Number of children: Not on file    Years of education: Not on file    Highest education level: Not on file   Occupational History    Not on file   Social Needs    Financial resource strain: Not on file    Food insecurity:     Worry: Not on file Inability: Not on file    Transportation needs:     Medical: Not on file     Non-medical: Not on file   Tobacco Use    Smoking status: Current Every Day Smoker     Packs/day: 0.25     Years: 50.00     Pack years: 12.50    Smokeless tobacco: Never Used   Substance and Sexual Activity    Alcohol use: Yes     Comment: 3    Drug use: No    Sexual activity: Yes     Partners: Female   Lifestyle    Physical activity:     Days per week: Not on file     Minutes per session: Not on file    Stress: Not on file   Relationships    Social connections:     Talks on phone: Not on file     Gets together: Not on file     Attends Sabianist service: Not on file     Active member of club or organization: Not on file     Attends meetings of clubs or organizations: Not on file     Relationship status: Not on file    Intimate partner violence:     Fear of current or ex partner: Not on file     Emotionally abused: Not on file     Physically abused: Not on file     Forced sexual activity: Not on file   Other Topics Concern    Not on file   Social History Narrative    Not on file       Family History   Problem Relation Age of Onset    Cancer Father          OBJECTIVE    Physical Exam:     Visit Vitals  /82 (BP 1 Location: Left arm, BP Patient Position: Sitting)   Pulse (!) 56   Temp 98 °F (36.7 °C) (Oral)   Resp 16   Ht 5' 6\" (1.676 m)   Wt 157 lb (71.2 kg)   SpO2 97%   BMI 25.34 kg/m²       General: alert, AA, in no apparent distress or pain  Neck: supple, no adenopathy palpated  CVS: normal rate, regular rhythm, distinct S1 and S2  Lungs:clear to ausculation bilaterally, no crackles, wheezing or rhonchi noted  Extremities: no edema, no cyanosis,  Skin: warm, no lesions, rashes noted  Psych:  mood and affect normal    Results for orders placed or performed in visit on 08/05/19   AMB POC URINALYSIS DIP STICK AUTO W/O MICRO   Result Value Ref Range    Color (UA POC) Yellow     Clarity (UA POC) Slightly Cloudy     Glucose (UA POC) Negative Negative    Bilirubin (UA POC) Negative Negative    Ketones (UA POC) Negative Negative    Specific gravity (UA POC) 1.015 1.001 - 1.035    Blood (UA POC) Trace Negative    pH (UA POC) 5.5 4.6 - 8.0    Protein (UA POC) 2+ Negative    Urobilinogen (UA POC) 0.2 mg/dL 0.2 - 1    Nitrites (UA POC) Negative Negative    Leukocyte esterase (UA POC) 1+ Negative           ASSESSMENT/PLAN  Diagnoses and all orders for this visit:     Essential hypertension   controlled  Cont hydralazine 25 mg tid  cont coreg 6.25 mg   cont lisinopril 10   Check cmp/lipid panel/tsh prior to next visit next radha    Hyperlipidemia  Good range on mevacor, to cont      Acquired hypothyroidism  Recent dose reduction with low TSH  Currently 150 mcgs/day  Recheck labs     Tobacco use  Counseled on cessation  AAA screen negative 2018  pcv 23 completed  Annual flu vaccine     CKD stage 3  Avoid nsaids  Monitoring, cont lisinopril  Optimize cv risk reduction  Check CMP    Increased urinary frequency  Treat for ALAN, bactrim  + 2 blood, plan for repeat UA on next visit (pt is a smoker)  Sent for UCX    Hematuria, unspecified type  See above      · Ff-up in 4 weeks or sooner prn      Patient/guardian understands plan of care. Patient has provided input and agrees with goals. Future labs to be discussed on next visit.

## 2019-08-07 LAB
BACTERIA SPEC CULT: ABNORMAL
SERVICE CMNT-IMP: ABNORMAL

## 2019-09-18 ENCOUNTER — HOSPITAL ENCOUNTER (OUTPATIENT)
Dept: LAB | Age: 74
Discharge: HOME | End: 2019-09-18
Payer: MEDICARE

## 2019-09-18 DIAGNOSIS — N18.30 CKD (CHRONIC KIDNEY DISEASE) STAGE 3, GFR 30-59 ML/MIN (HCC): ICD-10-CM

## 2019-09-18 DIAGNOSIS — I10 ESSENTIAL HYPERTENSION: ICD-10-CM

## 2019-09-18 DIAGNOSIS — E03.9 ACQUIRED HYPOTHYROIDISM: ICD-10-CM

## 2019-09-18 LAB
ALBUMIN SERPL-MCNC: 3.1 G/DL (ref 3.4–5)
ALBUMIN/GLOB SERPL: 0.9 {RATIO} (ref 0.8–1.7)
ALP SERPL-CCNC: 117 U/L (ref 45–117)
ALT SERPL-CCNC: 11 U/L (ref 16–61)
ANION GAP SERPL CALC-SCNC: 5 MMOL/L (ref 3–18)
AST SERPL-CCNC: 13 U/L (ref 10–38)
BILIRUB SERPL-MCNC: 0.4 MG/DL (ref 0.2–1)
BUN SERPL-MCNC: 13 MG/DL (ref 7–18)
BUN/CREAT SERPL: 10 (ref 12–20)
CALCIUM SERPL-MCNC: 8.6 MG/DL (ref 8.5–10.1)
CHLORIDE SERPL-SCNC: 112 MMOL/L (ref 100–111)
CHOLEST SERPL-MCNC: 151 MG/DL
CO2 SERPL-SCNC: 27 MMOL/L (ref 21–32)
CREAT SERPL-MCNC: 1.28 MG/DL (ref 0.6–1.3)
GLOBULIN SER CALC-MCNC: 3.3 G/DL (ref 2–4)
GLUCOSE SERPL-MCNC: 87 MG/DL (ref 74–99)
HDLC SERPL-MCNC: 60 MG/DL (ref 40–60)
HDLC SERPL: 2.5 {RATIO} (ref 0–5)
LDLC SERPL CALC-MCNC: 73.8 MG/DL (ref 0–100)
LIPID PROFILE,FLP: NORMAL
POTASSIUM SERPL-SCNC: 3.8 MMOL/L (ref 3.5–5.5)
PROT SERPL-MCNC: 6.4 G/DL (ref 6.4–8.2)
SODIUM SERPL-SCNC: 144 MMOL/L (ref 136–145)
TRIGL SERPL-MCNC: 86 MG/DL (ref ?–150)
TSH SERPL DL<=0.05 MIU/L-ACNC: 0.28 UIU/ML (ref 0.36–3.74)
VLDLC SERPL CALC-MCNC: 17.2 MG/DL

## 2019-09-18 PROCEDURE — 80061 LIPID PANEL: CPT

## 2019-09-18 PROCEDURE — 80053 COMPREHEN METABOLIC PANEL: CPT

## 2019-09-18 PROCEDURE — 36415 COLL VENOUS BLD VENIPUNCTURE: CPT

## 2019-09-18 PROCEDURE — 84443 ASSAY THYROID STIM HORMONE: CPT

## 2019-09-20 ENCOUNTER — OFFICE VISIT (OUTPATIENT)
Dept: FAMILY MEDICINE CLINIC | Age: 74
End: 2019-09-20

## 2019-09-20 ENCOUNTER — HOSPITAL ENCOUNTER (OUTPATIENT)
Dept: LAB | Age: 74
Discharge: HOME | End: 2019-09-20
Payer: MEDICARE

## 2019-09-20 VITALS
BODY MASS INDEX: 24.59 KG/M2 | DIASTOLIC BLOOD PRESSURE: 94 MMHG | WEIGHT: 153 LBS | RESPIRATION RATE: 16 BRPM | HEIGHT: 66 IN | TEMPERATURE: 97.7 F | SYSTOLIC BLOOD PRESSURE: 140 MMHG | OXYGEN SATURATION: 97 % | HEART RATE: 61 BPM

## 2019-09-20 DIAGNOSIS — N18.30 CKD (CHRONIC KIDNEY DISEASE) STAGE 3, GFR 30-59 ML/MIN (HCC): ICD-10-CM

## 2019-09-20 DIAGNOSIS — Z72.0 TOBACCO USE: ICD-10-CM

## 2019-09-20 DIAGNOSIS — R31.29 MICROSCOPIC HEMATURIA: ICD-10-CM

## 2019-09-20 DIAGNOSIS — E03.9 ACQUIRED HYPOTHYROIDISM: ICD-10-CM

## 2019-09-20 DIAGNOSIS — E78.00 PURE HYPERCHOLESTEROLEMIA: ICD-10-CM

## 2019-09-20 DIAGNOSIS — I10 ESSENTIAL HYPERTENSION: ICD-10-CM

## 2019-09-20 DIAGNOSIS — E03.9 HYPOTHYROIDISM, UNSPECIFIED TYPE: ICD-10-CM

## 2019-09-20 DIAGNOSIS — R31.29 MICROSCOPIC HEMATURIA: Primary | ICD-10-CM

## 2019-09-20 LAB
APPEARANCE UR: CLEAR
BACTERIA URNS QL MICRO: NEGATIVE /HPF
BILIRUB UR QL STRIP: NEGATIVE
BILIRUB UR QL: NEGATIVE
COLOR UR: YELLOW
EPITH CASTS URNS QL MICRO: ABNORMAL /LPF (ref 0–5)
GLUCOSE UR STRIP.AUTO-MCNC: NEGATIVE MG/DL
GLUCOSE UR-MCNC: NEGATIVE MG/DL
HGB UR QL STRIP: NEGATIVE
KETONES P FAST UR STRIP-MCNC: NEGATIVE MG/DL
KETONES UR QL STRIP.AUTO: NEGATIVE MG/DL
LEUKOCYTE ESTERASE UR QL STRIP.AUTO: NEGATIVE
NITRITE UR QL STRIP.AUTO: NEGATIVE
PH UR STRIP: 5.5 [PH] (ref 4.6–8)
PH UR STRIP: 5.5 [PH] (ref 5–8)
PROT UR QL STRIP: NORMAL
PROT UR STRIP-MCNC: 100 MG/DL
RBC #/AREA URNS HPF: ABNORMAL /HPF (ref 0–5)
SP GR UR REFRACTOMETRY: 1.01 (ref 1–1.03)
SP GR UR STRIP: 1.01 (ref 1–1.03)
UA UROBILINOGEN AMB POC: NORMAL (ref 0.2–1)
URINALYSIS CLARITY POC: CLEAR
URINALYSIS COLOR POC: YELLOW
URINE BLOOD POC: NORMAL
URINE LEUKOCYTES POC: NEGATIVE
URINE NITRITES POC: NEGATIVE
UROBILINOGEN UR QL STRIP.AUTO: 0.2 EU/DL (ref 0.2–1)
WBC URNS QL MICRO: ABNORMAL /HPF (ref 0–4)

## 2019-09-20 PROCEDURE — 81001 URINALYSIS AUTO W/SCOPE: CPT

## 2019-09-20 NOTE — PATIENT INSTRUCTIONS
DASH Diet: Care Instructions Your Care Instructions The DASH diet is an eating plan that can help lower your blood pressure. DASH stands for Dietary Approaches to Stop Hypertension. Hypertension is high blood pressure. The DASH diet focuses on eating foods that are high in calcium, potassium, and magnesium. These nutrients can lower blood pressure. The foods that are highest in these nutrients are fruits, vegetables, low-fat dairy products, nuts, seeds, and legumes. But taking calcium, potassium, and magnesium supplements instead of eating foods that are high in those nutrients does not have the same effect. The DASH diet also includes whole grains, fish, and poultry. The DASH diet is one of several lifestyle changes your doctor may recommend to lower your high blood pressure. Your doctor may also want you to decrease the amount of sodium in your diet. Lowering sodium while following the DASH diet can lower blood pressure even further than just the DASH diet alone. Follow-up care is a key part of your treatment and safety. Be sure to make and go to all appointments, and call your doctor if you are having problems. It's also a good idea to know your test results and keep a list of the medicines you take. How can you care for yourself at home? Following the DASH diet · Eat 4 to 5 servings of fruit each day. A serving is 1 medium-sized piece of fruit, ½ cup chopped or canned fruit, 1/4 cup dried fruit, or 4 ounces (½ cup) of fruit juice. Choose fruit more often than fruit juice. · Eat 4 to 5 servings of vegetables each day. A serving is 1 cup of lettuce or raw leafy vegetables, ½ cup of chopped or cooked vegetables, or 4 ounces (½ cup) of vegetable juice. Choose vegetables more often than vegetable juice. · Get 2 to 3 servings of low-fat and fat-free dairy each day. A serving is 8 ounces of milk, 1 cup of yogurt, or 1 ½ ounces of cheese. · Eat 6 to 8 servings of grains each day. A serving is 1 slice of bread, 1 ounce of dry cereal, or ½ cup of cooked rice, pasta, or cooked cereal. Try to choose whole-grain products as much as possible. · Limit lean meat, poultry, and fish to 2 servings each day. A serving is 3 ounces, about the size of a deck of cards. · Eat 4 to 5 servings of nuts, seeds, and legumes (cooked dried beans, lentils, and split peas) each week. A serving is 1/3 cup of nuts, 2 tablespoons of seeds, or ½ cup of cooked beans or peas. · Limit fats and oils to 2 to 3 servings each day. A serving is 1 teaspoon of vegetable oil or 2 tablespoons of salad dressing. · Limit sweets and added sugars to 5 servings or less a week. A serving is 1 tablespoon jelly or jam, ½ cup sorbet, or 1 cup of lemonade. · Eat less than 2,300 milligrams (mg) of sodium a day. If you limit your sodium to 1,500 mg a day, you can lower your blood pressure even more. Tips for success · Start small. Do not try to make dramatic changes to your diet all at once. You might feel that you are missing out on your favorite foods and then be more likely to not follow the plan. Make small changes, and stick with them. Once those changes become habit, add a few more changes. · Try some of the following: ? Make it a goal to eat a fruit or vegetable at every meal and at snacks. This will make it easy to get the recommended amount of fruits and vegetables each day. ? Try yogurt topped with fruit and nuts for a snack or healthy dessert. ? Add lettuce, tomato, cucumber, and onion to sandwiches. ? Combine a ready-made pizza crust with low-fat mozzarella cheese and lots of vegetable toppings. Try using tomatoes, squash, spinach, broccoli, carrots, cauliflower, and onions. ? Have a variety of cut-up vegetables with a low-fat dip as an appetizer instead of chips and dip. ? Sprinkle sunflower seeds or chopped almonds over salads.  Or try adding chopped walnuts or almonds to cooked vegetables. ? Try some vegetarian meals using beans and peas. Add garbanzo or kidney beans to salads. Make burritos and tacos with mashed rodriguez beans or black beans. Where can you learn more? Go to http://mike-marcos.info/. Enter B371 in the search box to learn more about \"DASH Diet: Care Instructions. \" Current as of: April 9, 2019 Content Version: 12.2 © 7799-1285 Compliance [employer]. Care instructions adapted under license by [employer] (which disclaims liability or warranty for this information). If you have questions about a medical condition or this instruction, always ask your healthcare professional. [address] any warranty or liability for your use of this information.

## 2019-09-20 NOTE — PROGRESS NOTES
Cesario Galindo, 68 y.o.,  male    SUBJECTIVE  Ff-up    HTN with CKD 3-. Taking medications without problems. (norvasc too costly)    HL- Taking mevacor without problems. Reviewed labs     He is a chronic smoker, about 5 cig/ day. aaa screen neg (2018)    Hypothyroidism-recent reduction of synthroid dose to 150 mcgs/day with low TSH, this has improved, will keep on this dose and recheck in 3 months    Remote h/o throat ca s/p radiation/chemotherapy 2003    2 ED visits in august since last visit, had E coli cystitis treated with bactrim. Developed flank pain, UCx negative persistent trace blood on UA in the ED, CT abdomen on 2nd ED visit unrevealing, simple renal cysts noted. He was given flexeril with relief. He is now symptom free    ROS:  See HPI, all others negative        Patient Active Problem List   Diagnosis Code    Essential hypertension I10    Acquired hypothyroidism E03.9    Tobacco use Z72.0    CKD (chronic kidney disease) stage 3, GFR 30-59 ml/min ([facility]) N18.3       Current Outpatient Medications   Medication Sig Dispense Refill    levothyroxine (SYNTHROID) 25 mcg tablet Take 1 Tab by mouth Daily (before breakfast). 60 Tab 0    hydrALAZINE (APRESOLINE) 25 mg tablet Take 1 Tab by mouth three (3) times daily. 270 Tab 0    lovastatin (MEVACOR) 20 mg tablet Take 2 Tabs by mouth nightly. 180 Tab 3    lisinopril (PRINIVIL, ZESTRIL) 10 mg tablet Take 1 Tab by mouth daily. 90 Tab 2    carvedilol (COREG) 6.25 mg tablet Take 1 Tab by mouth two (2) times daily (with meals).  180 Tab 3    levothyroxine (SYNTHROID) 150 mcg tablet TAKE 1 TABLET BY MOUTH ONCE DAILY BEFORE BREAKFAST 90 Tab 2       No Known Allergies    Past Medical History:   Diagnosis Date    Cancer ([facility] [address] 2012    throat    Gout     Hx of colonoscopy with polypectomy 11/15/2016    rpt 5 years 2021, dr. Saul Barragan    Hypertension     Pancreatitis 1989       Social History     Socioeconomic History    Marital status:      Spouse name: Not on file    Number of children: Not on file    Years of education: Not on file    Highest education level: Not on file   Occupational History    Not on file   Social Needs    Financial resource strain: Not on file    Food insecurity:     Worry: Not on file     Inability: Not on file    Transportation needs:     Medical: Not on file     Non-medical: Not on file   Tobacco Use    Smoking status: Current Every Day Smoker     Packs/day: 0.25     Years: 50.00     Pack years: 12.50    Smokeless tobacco: Never Used   Substance and Sexual Activity    Alcohol use: Yes     Comment: 3    Drug use: No    Sexual activity: Yes     Partners: Female   Lifestyle    Physical activity:     Days per week: Not on file     Minutes per session: Not on file    Stress: Not on file   Relationships    Social connections:     Talks on phone: Not on file     Gets together: Not on file     Attends Sabianism service: Not on file     Active member of club or organization: Not on file     Attends meetings of clubs or organizations: Not on file     Relationship status: Not on file    Intimate partner violence:     Fear of current or ex partner: Not on file     Emotionally abused: Not on file     Physically abused: Not on file     Forced sexual activity: Not on file   Other Topics Concern    Not on file   Social History Narrative    Not on file       Family History   Problem Relation Age of Onset    Cancer Father          OBJECTIVE    Physical Exam:   Visit Vitals  BP (!) 140/94 (BP 1 Location: Left arm, BP Patient Position: Sitting)   Pulse 61   Temp 97.7 °F (36.5 °C) (Oral)   Resp 16   Ht 5' 6\" (1.676 m)   Wt 153 lb (69.4 kg)   SpO2 97%   BMI 24.69 kg/m²         General: alert, AA, in no apparent distress or pain  Neck: supple, no adenopathy palpated  CVS: normal rate, regular rhythm, distinct S1 and S2  Lungs:clear to ausculation bilaterally, no crackles, wheezing or rhonchi noted  Extremities: no edema, no cyanosis, no flank pain  Skin: warm, no lesions, rashes noted  Psych:  mood and affect normal    Results for orders placed or performed during the hospital encounter of 98/31/17   METABOLIC PANEL, COMPREHENSIVE   Result Value Ref Range    Sodium 144 136 - 145 mmol/L    Potassium 3.8 3.5 - 5.5 mmol/L    Chloride 112 (H) 100 - 111 mmol/L    CO2 27 21 - 32 mmol/L    Anion gap 5 3.0 - 18 mmol/L    Glucose 87 74 - 99 mg/dL    BUN 13 7.0 - 18 MG/DL    Creatinine 1.28 0.6 - 1.3 MG/DL    BUN/Creatinine ratio 10 (L) 12 - 20      GFR est AA >60 >60 ml/min/1.73m2    GFR est non-AA 55 (L) >60 ml/min/1.73m2    Calcium 8.6 8.5 - 10.1 MG/DL    Bilirubin, total 0.4 0.2 - 1.0 MG/DL    ALT (SGPT) 11 (L) 16 - 61 U/L    AST (SGOT) 13 10 - 38 U/L    Alk.  phosphatase 117 45 - 117 U/L    Protein, total 6.4 6.4 - 8.2 g/dL    Albumin 3.1 (L) 3.4 - 5.0 g/dL    Globulin 3.3 2.0 - 4.0 g/dL    A-G Ratio 0.9 0.8 - 1.7     LIPID PANEL   Result Value Ref Range    LIPID PROFILE          Cholesterol, total 151 <200 MG/DL    Triglyceride 86 <150 MG/DL    HDL Cholesterol 60 40 - 60 MG/DL    LDL, calculated 73.8 0 - 100 MG/DL    VLDL, calculated 17.2 MG/DL    CHOL/HDL Ratio 2.5 0 - 5.0     TSH 3RD GENERATION   Result Value Ref Range    TSH 0.28 (L) 0.36 - 3.74 uIU/mL     Results for orders placed or performed in visit on 09/20/19   AMB POC URINALYSIS DIP STICK AUTO W/O MICRO   Result Value Ref Range    Color (UA POC) Yellow     Clarity (UA POC) Clear     Glucose (UA POC) Negative Negative    Bilirubin (UA POC) Negative Negative    Ketones (UA POC) Negative Negative    Specific gravity (UA POC) 1.010 1.001 - 1.035    Blood (UA POC) Trace Negative    pH (UA POC) 5.5 4.6 - 8.0    Protein (UA POC) 3+ Negative    Urobilinogen (UA POC) 0.2 mg/dL 0.2 - 1    Nitrites (UA POC) Negative Negative    Leukocyte esterase (UA POC) Negative Negative       ASSESSMENT/PLAN  Diagnoses and all orders for this visit:  Microscopic hematuria  Persistent, w/ h/o smoking discussed concern for need for further evaluation, to r/o bladder malignancy  Referral to urology   Send for microscopic analysis     Essential hypertension  Mildly elevated today, advised to keep monitoring at home   previously controlled  Cont hydralazine 25 mg tid  cont coreg 6.25 mg   cont lisinopril 10     Hyperlipidemia  Good range on mevacor, to cont    Acquired hypothyroidism  Recent dose reduction with low TSH, this has improved  Currently 150 mcgs/day, will cont  Recheck TSH In 3 months  Recheck labs     Tobacco use  Counseled on cessation  AAA screen negative 2018  pcv 23 completed  Annual flu vaccine he declines     CKD stage 3  Avoid nsaids  Monitoring, cont lisinopril  Optimize cv risk reduction  Check BMP    · Ff-up in 12 weeks or sooner prn      Patient/guardian understands plan of care. Patient has provided input and agrees with goals. Future labs to be discussed on next visit.

## 2019-10-21 RX ORDER — LISINOPRIL 10 MG/1
10 TABLET ORAL DAILY
Qty: 90 TAB | Refills: 2 | Status: SHIPPED | OUTPATIENT
Start: 2019-10-21 | End: 2021-02-12

## 2019-10-21 RX ORDER — LEVOTHYROXINE SODIUM 150 UG/1
TABLET ORAL
Qty: 90 TAB | Refills: 0 | Status: SHIPPED | OUTPATIENT
Start: 2019-10-21 | End: 2020-01-09

## 2019-10-21 NOTE — TELEPHONE ENCOUNTER
This patient contacted office for the following prescriptions to be filled:    Medication requested :   Requested Prescriptions     Pending Prescriptions Disp Refills    lisinopril (PRINIVIL, ZESTRIL) 10 mg tablet 90 Tab 2     Sig: Take 1 Tab by mouth daily.     levothyroxine (SYNTHROID) 150 mcg tablet 90 Tab 0     Sig: TAKE 1 TABLET BY MOUTH ONCE DAILY BEFORE BREAKFAST     PCP: [address] [place] [address] or Print: [facility]   Mail order or Local pharmacy [address] [place] [address]    Scheduled appointment if not seen by current providers in office:  LOV 9/20/2019 f/u 12/20/2019

## 2019-11-14 ENCOUNTER — OFFICE VISIT (OUTPATIENT)
Dept: UROLOGY | Age: 74
End: 2019-11-14

## 2019-11-14 VITALS
WEIGHT: 154 LBS | OXYGEN SATURATION: 96 % | SYSTOLIC BLOOD PRESSURE: 206 MMHG | BODY MASS INDEX: 24.75 KG/M2 | HEART RATE: 62 BPM | HEIGHT: 66 IN | DIASTOLIC BLOOD PRESSURE: 96 MMHG

## 2019-11-14 DIAGNOSIS — N40.1 BPH WITH OBSTRUCTION/LOWER URINARY TRACT SYMPTOMS: ICD-10-CM

## 2019-11-14 DIAGNOSIS — R31.29 MICROSCOPIC HEMATURIA: Primary | ICD-10-CM

## 2019-11-14 DIAGNOSIS — N39.0 URINARY TRACT INFECTION WITHOUT HEMATURIA, SITE UNSPECIFIED: ICD-10-CM

## 2019-11-14 DIAGNOSIS — N13.8 BPH WITH OBSTRUCTION/LOWER URINARY TRACT SYMPTOMS: ICD-10-CM

## 2019-11-14 LAB
BILIRUB UR QL STRIP: NEGATIVE
GLUCOSE UR-MCNC: NEGATIVE MG/DL
KETONES P FAST UR STRIP-MCNC: NEGATIVE MG/DL
PH UR STRIP: 6 [PH] (ref 4.6–8)
PROT UR QL STRIP: NORMAL
SP GR UR STRIP: 1.03 (ref 1–1.03)
UA UROBILINOGEN AMB POC: NORMAL (ref 0.2–1)
URINALYSIS CLARITY POC: CLEAR
URINALYSIS COLOR POC: YELLOW
URINE BLOOD POC: NORMAL
URINE LEUKOCYTES POC: NEGATIVE
URINE NITRITES POC: NEGATIVE

## 2019-11-14 NOTE — PROGRESS NOTES
Mr. Vikki Chris has a reminder for a \"due or due soon\" health maintenance. I have asked that he contact his primary care provider for follow-up on this health maintenance.

## 2019-11-14 NOTE — PROGRESS NOTES
Taras Walsh 76 y.o. male     Mr. Walsh seen today for evaluation of microscopic hematuria found on office urinalysis-has history of presenting to the emergency room at [facility] in August 2019 complaining of left flank pain finding normal findings on CT scan imaging--felt to have passed a kidney stone spontaneously at that time. E. coli UTI on 7 August 2019 (treated?)  Patient has no history of gross hematuria no history of  tract disease, trauma, or surgery no family history of prostate malignancies    18 September 2019 [facility] ER  CT scan imaging of abdomen and pelvis  IMPRESSION  No abnormality to explain pain symptoms. No obstructive urolithiasis, and the appendix appears normal.  Signed By: Tania Burrell MD on 8/19/2019 12:32 PM    EMR review-urinalysis showed trace or+ heme in August and September-several UAs show negative heme    Review of Systems:   CNS: No seizure syncope headaches dizziness or visual changes  Respiratory: No wheezing shortness of breath chest pain or coughing  Cardiovascular: Hypertension  Intestinal: No dyspepsia diarrhea or constipation  Urinary: No urgency frequency dysuria or nocturia patient has no irritative or obstructive voiding symptoms-no nocturia  Skeletal: No bone or joint pain  Endocrine: No diabetes or thyroid disease  Other:                                                                            Retired /lifelong non-smoker    PVR 15 cc in November 2019    Allergies: No Known Allergies   Medications:    Current Outpatient Medications   Medication Sig Dispense Refill    lisinopril (PRINIVIL, ZESTRIL) 10 mg tablet Take 1 Tab by mouth daily. 90 Tab 2    levothyroxine (SYNTHROID) 150 mcg tablet TAKE 1 TABLET BY MOUTH ONCE DAILY BEFORE BREAKFAST 90 Tab 0    hydrALAZINE (APRESOLINE) 25 mg tablet Take 1 Tab by mouth three (3) times daily. 270 Tab 0    lovastatin (MEVACOR) 20 mg tablet Take 2 Tabs by mouth nightly.  180 Tab 3    carvedilol (COREG) 6.25 mg tablet Take 1 Tab by mouth two (2) times daily (with meals).  180 Tab 3       Past Medical History:   Diagnosis Date    Cancer ([address] 2012    throat    Gout     Hx of colonoscopy with polypectomy 11/15/2016    rpt 5 years 2021, dr. Pedro Rodriguez    Hypertension     Pancreatitis 1989    Thyroid disease       Past Surgical History:   Procedure Laterality Date    ABDOMEN SURGERY PROC UNLISTED      HX COLONOSCOPY  11/15/2016    repeat 5 years 2021, Dr Manuel Linder polyp     Social History     Socioeconomic History    Marital status:      Spouse name: Not on file    Number of children: Not on file    Years of education: Not on file    Highest education level: Not on file   Occupational History    Not on file   Social Needs    Financial resource strain: Not on file    Food insecurity:     Worry: Not on file     Inability: Not on file    Transportation needs:     Medical: Not on file     Non-medical: Not on file   Tobacco Use    Smoking status: Current Every Day Smoker     Packs/day: 0.25     Years: 50.00     Pack years: 12.50    Smokeless tobacco: Never Used   Substance and Sexual Activity    Alcohol use: Yes     Comment: 3    Drug use: No    Sexual activity: Yes     Partners: Female   Lifestyle    Physical activity:     Days per week: Not on file     Minutes per session: Not on file    Stress: Not on file   Relationships    Social connections:     Talks on phone: Not on file     Gets together: Not on file     Attends [employer] service: Not on file     Active member of club or organization: Not on file     Attends meetings of clubs or organizations: Not on file     Relationship status: Not on file    Intimate partner violence:     Fear of current or ex partner: Not on file     Emotionally abused: Not on file     Physically abused: Not on file     Forced sexual activity: Not on file   Other Topics Concern    Not on file   Social History Narrative    Not on file      Family History Problem Relation Age of Onset    Cancer Father         Physical Examination: Well-nourished trim and fit appearing adult male in no apparent distress    Abdomen is nontender no palpable masses no organomegaly  Back-no percussion CVA tenderness on either side  No inguinal hernia or adenopathy  Penis is normal  Testes are normal in size shape and consistency bilaterally-no epididymal induration or tenderness on either side  Spermatic cords are palpably normal bilaterally  Scrotum is normal  Prostate by CLIFFORD is rounded, smooth, benign in consistency and nontender-no induration no nodularity  No rectal masses tenderness or induration    Urinalysis: + Heme/negative nitrite/+++ protein    PVR today 15 cc    Impression: E. coli UTI x1-resolved                            Plan: PSA today    Microscopic hematuria in this case is not well established-no episodes of gross hematuria no voiding symptoms no history of  tract disease-lifelong non-smoker will not pursue further evaluation with cystoscopy or contrast imaging at this time-caution regarding gross hematuria or symptoms of urinary tract disease    RTC PRN gross hematuria or symptoms of urinary tract disease    RTC 1 year PSA CLIFFORD            Harish Villafana MD  -electronically signed-    PLEASE NOTE:  This document has been produced using voice recognition software. Unrecognized errors in transcription may be present.

## 2019-11-14 NOTE — PATIENT INSTRUCTIONS
Blood in the Urine: Care Instructions  Your Care Instructions    Blood in the urine, or hematuria, may make the urine look red, brown, or pink. There may be blood every time you urinate or just from time to time. You cannot always see blood in the urine, but it will show up in a urine test.  Blood in the urine may be serious. It should always be checked by a doctor. Your doctor may recommend more tests, including an X-ray, a CT scan, or a cystoscopy (which lets a doctor look inside the urethra and bladder). Blood in the urine can be a sign of another problem. Common causes are bladder infections and kidney stones. An injury to your groin or your genital area can also cause bleeding in the urinary tract. Very hard exercise--such as running a marathon--can cause blood in the urine. Blood in the urine can also be a sign of kidney disease or cancer in the bladder or kidney. Many cases of blood in the urine are caused by a harmless condition that runs in families. This is called benign familial hematuria. It does not need any treatment. Sometimes your urine may look red or brown even though it does not contain blood. For example, not getting enough fluids (dehydration), taking certain medicines, or having a liver problem can change the color of your urine. Eating foods such as beets, rhubarb, or blackberries or foods with red food coloring can make your urine look red or pink. Follow-up care is a key part of your treatment and safety. Be sure to make and go to all appointments, and call your doctor if you are having problems. It's also a good idea to know your test results and keep a list of the medicines you take. When should you call for help? Call your doctor now or seek immediate medical care if:    · You have symptoms of a urinary infection. For example:  ? You have pus in your urine. ? You have pain in your back just below your rib cage. This is called flank pain. ?  You have a fever, chills, or body aches.  ? It hurts to urinate. ? You have groin or belly pain.     · You have more blood in your urine.    Watch closely for changes in your health, and be sure to contact your doctor if:    · You have new urination problems.     · You do not get better as expected. Where can you learn more? Go to http://mike-marcos.info/. Enter V500 in the search box to learn more about \"Blood in the Urine: Care Instructions. \"  Current as of: December 19, 2018  Content Version: 12.2  © 6525-6834 [employer], [employer]. Care instructions adapted under license by [employer] (which disclaims liability or warranty for this information). If you have questions about a medical condition or this instruction, always ask your healthcare professional. [address] any warranty or liability for your use of this information.

## 2019-12-09 ENCOUNTER — DOCUMENTATION ONLY (OUTPATIENT)
Dept: UROLOGY | Age: 74
End: 2019-12-09

## 2019-12-10 ENCOUNTER — HOSPITAL ENCOUNTER (OUTPATIENT)
Dept: LAB | Age: 74
Discharge: HOME | End: 2019-12-10
Payer: MEDICARE

## 2019-12-10 DIAGNOSIS — N18.30 CKD (CHRONIC KIDNEY DISEASE) STAGE 3, GFR 30-59 ML/MIN (HCC): ICD-10-CM

## 2019-12-10 DIAGNOSIS — E03.9 ACQUIRED HYPOTHYROIDISM: ICD-10-CM

## 2019-12-10 LAB
ANION GAP SERPL CALC-SCNC: 5 MMOL/L (ref 3–18)
BUN SERPL-MCNC: 15 MG/DL (ref 7–18)
BUN/CREAT SERPL: 11 (ref 12–20)
CALCIUM SERPL-MCNC: 9.1 MG/DL (ref 8.5–10.1)
CHLORIDE SERPL-SCNC: 111 MMOL/L (ref 100–111)
CO2 SERPL-SCNC: 27 MMOL/L (ref 21–32)
CREAT SERPL-MCNC: 1.34 MG/DL (ref 0.6–1.3)
GLUCOSE SERPL-MCNC: 86 MG/DL (ref 74–99)
POTASSIUM SERPL-SCNC: 4.1 MMOL/L (ref 3.5–5.5)
SODIUM SERPL-SCNC: 143 MMOL/L (ref 136–145)
TSH SERPL DL<=0.05 MIU/L-ACNC: 0.19 UIU/ML (ref 0.36–3.74)

## 2019-12-10 PROCEDURE — 84443 ASSAY THYROID STIM HORMONE: CPT

## 2019-12-10 PROCEDURE — 84153 ASSAY OF PSA TOTAL: CPT

## 2019-12-10 PROCEDURE — 80048 BASIC METABOLIC PNL TOTAL CA: CPT

## 2019-12-10 PROCEDURE — 36415 COLL VENOUS BLD VENIPUNCTURE: CPT

## 2019-12-11 ENCOUNTER — DOCUMENTATION ONLY (OUTPATIENT)
Dept: UROLOGY | Age: 74
End: 2019-12-11

## 2019-12-11 ENCOUNTER — TELEPHONE (OUTPATIENT)
Dept: FAMILY MEDICINE CLINIC | Age: 74
End: 2019-12-11

## 2019-12-11 DIAGNOSIS — Z12.5 PROSTATE CANCER SCREENING: ICD-10-CM

## 2019-12-11 DIAGNOSIS — N40.0 BENIGN PROSTATIC HYPERPLASIA, UNSPECIFIED WHETHER LOWER URINARY TRACT SYMPTOMS PRESENT: Primary | ICD-10-CM

## 2019-12-11 NOTE — PROGRESS NOTES
Called Patient to see if he could have a PSA done at the lab. Patient said he was tired of running around all over the place that he had lab work done yesterday. I called Dr. Lim Forward office to see if they can add a PSA to the labs from yesterday. They were going to see if they can do it and call me back.

## 2019-12-11 NOTE — TELEPHONE ENCOUNTER
DR Parker's office is requesting a PSA  to be added to the labs that he had done yesterday for the DX N40.1/ BPH . Please let them know if it can be done.   Thank you

## 2019-12-13 LAB — PSA SERPL-MCNC: 0.6 NG/ML (ref 0–4)

## 2019-12-13 NOTE — TELEPHONE ENCOUNTER
Spoke with Vernon Mckeon at [facility] lab and PSA is okay to add on to labs drawn 12/10/2019.  Order has been faxed to [phone]

## 2019-12-18 ENCOUNTER — OFFICE VISIT (OUTPATIENT)
Dept: FAMILY MEDICINE CLINIC | Age: 74
End: 2019-12-18

## 2019-12-18 VITALS
RESPIRATION RATE: 16 BRPM | OXYGEN SATURATION: 99 % | WEIGHT: 157.6 LBS | SYSTOLIC BLOOD PRESSURE: 216 MMHG | TEMPERATURE: 97.6 F | BODY MASS INDEX: 25.33 KG/M2 | DIASTOLIC BLOOD PRESSURE: 100 MMHG | HEART RATE: 66 BPM | HEIGHT: 66 IN

## 2019-12-18 DIAGNOSIS — E03.9 ACQUIRED HYPOTHYROIDISM: ICD-10-CM

## 2019-12-18 DIAGNOSIS — Z72.0 TOBACCO USE: ICD-10-CM

## 2019-12-18 DIAGNOSIS — N18.30 CKD (CHRONIC KIDNEY DISEASE) STAGE 3, GFR 30-59 ML/MIN (HCC): ICD-10-CM

## 2019-12-18 DIAGNOSIS — E78.00 PURE HYPERCHOLESTEROLEMIA: ICD-10-CM

## 2019-12-18 DIAGNOSIS — I10 ESSENTIAL HYPERTENSION: Primary | ICD-10-CM

## 2019-12-18 RX ORDER — HYDRALAZINE HYDROCHLORIDE 25 MG/1
25 TABLET, FILM COATED ORAL 3 TIMES DAILY
Qty: 270 TAB | Refills: 0 | Status: SHIPPED | OUTPATIENT
Start: 2019-12-18 | End: 2021-02-11 | Stop reason: SDUPTHER

## 2019-12-18 RX ORDER — LOVASTATIN 20 MG/1
40 TABLET ORAL
Qty: 180 TAB | Refills: 3 | Status: SHIPPED | OUTPATIENT
Start: 2019-12-18 | End: 2021-02-11 | Stop reason: SDUPTHER

## 2019-12-18 RX ORDER — CARVEDILOL 6.25 MG/1
6.25 TABLET ORAL 2 TIMES DAILY WITH MEALS
Qty: 180 TAB | Refills: 3 | Status: CANCELLED | OUTPATIENT
Start: 2019-12-18

## 2019-12-18 RX ORDER — CARVEDILOL 6.25 MG/1
6.25 TABLET ORAL 2 TIMES DAILY WITH MEALS
Qty: 180 TAB | Refills: 3 | Status: SHIPPED | OUTPATIENT
Start: 2019-12-18 | End: 2021-02-11 | Stop reason: SDUPTHER

## 2019-12-18 RX ORDER — LEVOTHYROXINE SODIUM 125 UG/1
125 TABLET ORAL
Qty: 60 TAB | Refills: 0 | Status: SHIPPED | OUTPATIENT
Start: 2019-12-18 | End: 2020-02-14 | Stop reason: SDUPTHER

## 2019-12-18 RX ORDER — AMLODIPINE BESYLATE 5 MG/1
5 TABLET ORAL DAILY
Qty: 90 TAB | Refills: 0 | Status: SHIPPED | OUTPATIENT
Start: 2019-12-18 | End: 2020-01-09 | Stop reason: DRUGHIGH

## 2019-12-18 RX ORDER — LEVOTHYROXINE SODIUM 150 UG/1
TABLET ORAL
Qty: 90 TAB | Refills: 0 | Status: CANCELLED | OUTPATIENT
Start: 2019-12-18

## 2019-12-18 NOTE — PATIENT INSTRUCTIONS
DASH Diet: Care Instructions Your Care Instructions The DASH diet is an eating plan that can help lower your blood pressure. DASH stands for Dietary Approaches to Stop Hypertension. Hypertension is high blood pressure. The DASH diet focuses on eating foods that are high in calcium, potassium, and magnesium. These nutrients can lower blood pressure. The foods that are highest in these nutrients are fruits, vegetables, low-fat dairy products, nuts, seeds, and legumes. But taking calcium, potassium, and magnesium supplements instead of eating foods that are high in those nutrients does not have the same effect. The DASH diet also includes whole grains, fish, and poultry. The DASH diet is one of several lifestyle changes your doctor may recommend to lower your high blood pressure. Your doctor may also want you to decrease the amount of sodium in your diet. Lowering sodium while following the DASH diet can lower blood pressure even further than just the DASH diet alone. Follow-up care is a key part of your treatment and safety. Be sure to make and go to all appointments, and call your doctor if you are having problems. It's also a good idea to know your test results and keep a list of the medicines you take. How can you care for yourself at home? Following the DASH diet · Eat 4 to 5 servings of fruit each day. A serving is 1 medium-sized piece of fruit, ½ cup chopped or canned fruit, 1/4 cup dried fruit, or 4 ounces (½ cup) of fruit juice. Choose fruit more often than fruit juice. · Eat 4 to 5 servings of vegetables each day. A serving is 1 cup of lettuce or raw leafy vegetables, ½ cup of chopped or cooked vegetables, or 4 ounces (½ cup) of vegetable juice. Choose vegetables more often than vegetable juice. · Get 2 to 3 servings of low-fat and fat-free dairy each day. A serving is 8 ounces of milk, 1 cup of yogurt, or 1 ½ ounces of cheese. · Eat 6 to 8 servings of grains each day. A serving is 1 slice of bread, 1 ounce of dry cereal, or ½ cup of cooked rice, pasta, or cooked cereal. Try to choose whole-grain products as much as possible. · Limit lean meat, poultry, and fish to 2 servings each day. A serving is 3 ounces, about the size of a deck of cards. · Eat 4 to 5 servings of nuts, seeds, and legumes (cooked dried beans, lentils, and split peas) each week. A serving is 1/3 cup of nuts, 2 tablespoons of seeds, or ½ cup of cooked beans or peas. · Limit fats and oils to 2 to 3 servings each day. A serving is 1 teaspoon of vegetable oil or 2 tablespoons of salad dressing. · Limit sweets and added sugars to 5 servings or less a week. A serving is 1 tablespoon jelly or jam, ½ cup sorbet, or 1 cup of lemonade. · Eat less than 2,300 milligrams (mg) of sodium a day. If you limit your sodium to 1,500 mg a day, you can lower your blood pressure even more. Tips for success · Start small. Do not try to make dramatic changes to your diet all at once. You might feel that you are missing out on your favorite foods and then be more likely to not follow the plan. Make small changes, and stick with them. Once those changes become habit, add a few more changes. · Try some of the following: ? Make it a goal to eat a fruit or vegetable at every meal and at snacks. This will make it easy to get the recommended amount of fruits and vegetables each day. ? Try yogurt topped with fruit and nuts for a snack or healthy dessert. ? Add lettuce, tomato, cucumber, and onion to sandwiches. ? Combine a ready-made pizza crust with low-fat mozzarella cheese and lots of vegetable toppings. Try using tomatoes, squash, spinach, broccoli, carrots, cauliflower, and onions. ? Have a variety of cut-up vegetables with a low-fat dip as an appetizer instead of chips and dip. ? Sprinkle sunflower seeds or chopped almonds over salads.  Or try adding chopped walnuts or almonds to cooked vegetables. ? Try some vegetarian meals using beans and peas. Add garbanzo or kidney beans to salads. Make burritos and tacos with mashed rodriguez beans or black beans. Where can you learn more? Go to http://mike-marcos.info/. Enter A643 in the search box to learn more about \"DASH Diet: Care Instructions. \" Current as of: April 9, 2019 Content Version: 12.2 © 8254-9489 [employer]. Care instructions adapted under license by [employer] (which disclaims liability or warranty for this information). If you have questions about a medical condition or this instruction, always ask your healthcare professional. [address] any warranty or liability for your use of this information.

## 2019-12-18 NOTE — PROGRESS NOTES
Pedro Acuna, 68 y.o.,  male    SUBJECTIVE  Ff-up    HTN with CKD 3-. Reports past month noted elevated BP readings 's. He has not felt any different, denies headache, BOV, no  change to medication or diet. Reviewed labs creatinine level is about the same, TSH level is low. He has known hypothyroidism on LT4 replacement. He continues to smoke about 3 cig/day    HL- Taking mevacor without problems. Reviewed labs     He is a chronic smoker, about 5 cig/ day. aaa screen neg (2018)    Hypothyroidism-recent reduction of synthroid dose to 150 mcgs/day with low TSH, this has improved, will keep on this dose and recheck in 3 months    Remote h/o throat ca s/p radiation/chemotherapy 2003    Microscopic hematuria- reviewed note from Dr. Gertrudis Roberson did not find this concerning with neg UA on visit with him and no further evaluation warranted. He recommended PSA check    ROS:  See HPI, all others negative        Patient Active Problem List   Diagnosis Code    Essential hypertension I10    Acquired hypothyroidism E03.9    Tobacco use Z72.0    CKD (chronic kidney disease) stage 3, GFR 30-59 ml/min ([facility]) N18.3       Current Outpatient Medications   Medication Sig Dispense Refill    levothyroxine (SYNTHROID) 25 mcg tablet Take 1 Tab by mouth Daily (before breakfast). 60 Tab 0    hydrALAZINE (APRESOLINE) 25 mg tablet Take 1 Tab by mouth three (3) times daily. 270 Tab 0    lovastatin (MEVACOR) 20 mg tablet Take 2 Tabs by mouth nightly. 180 Tab 3    lisinopril (PRINIVIL, ZESTRIL) 10 mg tablet Take 1 Tab by mouth daily. 90 Tab 2    carvedilol (COREG) 6.25 mg tablet Take 1 Tab by mouth two (2) times daily (with meals).  180 Tab 3    levothyroxine (SYNTHROID) 150 mcg tablet TAKE 1 TABLET BY MOUTH ONCE DAILY BEFORE BREAKFAST 90 Tab 2       No Known Allergies    Past Medical History:   Diagnosis Date    Cancer ([facility] [address] 2012    throat    Gout     Hx of colonoscopy with polypectomy 11/15/2016    rpt 5 years 2021, dr. Pedro Rodriguez  Hypertension     Pancreatitis 1989       Social History     Socioeconomic History    Marital status:      Spouse name: Not on file    Number of children: Not on file    Years of education: Not on file    Highest education level: Not on file   Occupational History    Not on file   Social Needs    Financial resource strain: Not on file    Food insecurity:     Worry: Not on file     Inability: Not on file    Transportation needs:     Medical: Not on file     Non-medical: Not on file   Tobacco Use    Smoking status: Current Every Day Smoker     Packs/day: 0.25     Years: 50.00     Pack years: 12.50    Smokeless tobacco: Never Used   Substance and Sexual Activity    Alcohol use: Yes     Comment: 3    Drug use: No    Sexual activity: Yes     Partners: Female   Lifestyle    Physical activity:     Days per week: Not on file     Minutes per session: Not on file    Stress: Not on file   Relationships    Social connections:     Talks on phone: Not on file     Gets together: Not on file     Attends [employer] service: Not on file     Active member of club or organization: Not on file     Attends meetings of clubs or organizations: Not on file     Relationship status: Not on file    Intimate partner violence:     Fear of current or ex partner: Not on file     Emotionally abused: Not on file     Physically abused: Not on file     Forced sexual activity: Not on file   Other Topics Concern    Not on file   Social History Narrative    Not on file       Family History   Problem Relation Age of Onset    Cancer Father          OBJECTIVE    Physical Exam:   Visit Vitals  BP (!) 216/100 (BP 1 Location: Left arm, BP Patient Position: Sitting)   Pulse 66   Temp 97.6 °F (36.4 °C) (Oral)   Resp 16   Ht 5' 6\" (1.676 m)   Wt 157 lb 9.6 oz (71.5 kg)   SpO2 99%   BMI 25.44 kg/m²         General: alert, AA, in no apparent distress or pain  Neck: supple, no adenopathy palpated  CVS: normal rate, regular rhythm, distinct S1 and S2  Lungs:clear to ausculation bilaterally, no crackles, wheezing or rhonchi noted  Extremities: no edema, no cyanosis, no flank pain  Skin: warm, no lesions, rashes noted  Psych:  mood and affect normal    Results for orders placed or performed during the hospital encounter of 12/10/19   TSH 3RD GENERATION   Result Value Ref Range    TSH 0.19 (L) 0.36 - 4.78 uIU/mL   METABOLIC PANEL, BASIC   Result Value Ref Range    Sodium 143 136 - 145 mmol/L    Potassium 4.1 3.5 - 5.5 mmol/L    Chloride 111 100 - 111 mmol/L    CO2 27 21 - 32 mmol/L    Anion gap 5 3.0 - 18 mmol/L    Glucose 86 74 - 99 mg/dL    BUN 15 7.0 - 18 MG/DL    Creatinine 1.34 (H) 0.6 - 1.3 MG/DL    BUN/Creatinine ratio 11 (L) 12 - 20      GFR est AA >60 >60 ml/min/1.73m2    GFR est non-AA 52 (L) >60 ml/min/1.73m2    Calcium 9.1 8.5 - 10.1 MG/DL   PSA, DIAGNOSTIC (PROSTATE SPECIFIC AG)   Result Value Ref Range    Prostate Specific Ag 0.6 0.0 - 4.0 ng/mL     Results for orders placed or performed during the hospital encounter of 12/10/19   TSH 3RD GENERATION   Result Value Ref Range    TSH 0.19 (L) 0.36 - 2.15 uIU/mL   METABOLIC PANEL, BASIC   Result Value Ref Range    Sodium 143 136 - 145 mmol/L    Potassium 4.1 3.5 - 5.5 mmol/L    Chloride 111 100 - 111 mmol/L    CO2 27 21 - 32 mmol/L    Anion gap 5 3.0 - 18 mmol/L    Glucose 86 74 - 99 mg/dL    BUN 15 7.0 - 18 MG/DL    Creatinine 1.34 (H) 0.6 - 1.3 MG/DL    BUN/Creatinine ratio 11 (L) 12 - 20      GFR est AA >60 >60 ml/min/1.73m2    GFR est non-AA 52 (L) >60 ml/min/1.73m2    Calcium 9.1 8.5 - 10.1 MG/DL   PSA, DIAGNOSTIC (PROSTATE SPECIFIC AG)   Result Value Ref Range    Prostate Specific Ag 0.6 0.0 - 4.0 ng/mL       ASSESSMENT/PLAN  Diagnoses and all orders for this visit:       Essential hypertension  Markedly elevated, asymptomatic  I will reduce synthroid dose to 125 mcgs daily and recheck TSH in 6 weeks  Will add norvasc 5 mg , advised increase to 10 mg next week if BP >150/90 previously controlled, unclear cause for elevation as CKD status did not change  Cont hydralazine 25 mg tid  cont coreg 6.25 mg   cont lisinopril 10   DASH diet  BP log provided to bring  On next visit  Ff-up in 2 weeks    Hyperlipidemia  Good range on mevacor, to cont    Acquired hypothyroidism  See above     Tobacco use  Counseled on cessation  AAA screen negative 2018  pcv 23 completed  Annual flu vaccine he declines     CKD stage 3  Avoid nsaids  Monitoring, cont lisinopril  Optimize cv risk reduction  Check BMP    · Ff-up in 2 weeks or sooner prn      Patient/guardian understands plan of care. Patient has provided input and agrees with goals. Future labs to be discussed on next visit.

## 2019-12-18 NOTE — PROGRESS NOTES
1. Have you been to the ER, urgent care clinic since your last visit? Hospitalized since your last visit? No    2. Have you seen or consulted any other health care providers outside of the [address] [place] [address] since your last visit? Include any pap smears or colon screening. No    Chief Complaint   Patient presents with    Blood in Urine    Hypertension    Cholesterol Problem    Thyroid Problem    Chronic Kidney Disease       Patient refused flu vaccine.

## 2019-12-23 DIAGNOSIS — R31.29 MICROSCOPIC HEMATURIA: ICD-10-CM

## 2019-12-23 DIAGNOSIS — N40.0 BENIGN PROSTATIC HYPERPLASIA, UNSPECIFIED WHETHER LOWER URINARY TRACT SYMPTOMS PRESENT: Primary | ICD-10-CM

## 2020-01-09 ENCOUNTER — OFFICE VISIT (OUTPATIENT)
Dept: FAMILY MEDICINE CLINIC | Age: 75
End: 2020-01-09

## 2020-01-09 VITALS
SYSTOLIC BLOOD PRESSURE: 134 MMHG | DIASTOLIC BLOOD PRESSURE: 82 MMHG | WEIGHT: 157 LBS | HEIGHT: 66 IN | BODY MASS INDEX: 25.23 KG/M2 | TEMPERATURE: 98.2 F | RESPIRATION RATE: 16 BRPM | OXYGEN SATURATION: 96 % | HEART RATE: 55 BPM

## 2020-01-09 DIAGNOSIS — I10 ESSENTIAL HYPERTENSION: Primary | ICD-10-CM

## 2020-01-09 DIAGNOSIS — E03.9 ACQUIRED HYPOTHYROIDISM: ICD-10-CM

## 2020-01-09 DIAGNOSIS — E78.00 PURE HYPERCHOLESTEROLEMIA: ICD-10-CM

## 2020-01-09 DIAGNOSIS — Z72.0 TOBACCO USE: ICD-10-CM

## 2020-01-09 DIAGNOSIS — N18.30 CKD (CHRONIC KIDNEY DISEASE) STAGE 3, GFR 30-59 ML/MIN (HCC): ICD-10-CM

## 2020-01-09 RX ORDER — AMLODIPINE BESYLATE 10 MG/1
10 TABLET ORAL DAILY
Qty: 90 TAB | Refills: 0 | Status: SHIPPED | OUTPATIENT
Start: 2020-01-09 | End: 2020-05-04 | Stop reason: SDUPTHER

## 2020-01-09 NOTE — PROGRESS NOTES
1. Have you been to the ER, urgent care clinic since your last visit? Hospitalized since your last visit? No    2. Have you seen or consulted any other health care providers outside of the [address] [place] [address] since your last visit? Include any pap smears or colon screening.  No

## 2020-01-09 NOTE — PROGRESS NOTES
Stephanie Sterling, 68 y.o.,  male    SUBJECTIVE  Ff-up    HTN with CKD 3-. Added norvasc on last visit, he says he went up to taking 10 mg from 5 mg last week, now with improved BP readings. He continues to take coreg, lisinopril and hydralazine. HL- Taking mevacor without problems. He continues to smoke about about 3-5 cig/ day. aaa screen neg (2018)    Hypothyroidism-recent reduction of synthroid dose to 125 mcgs/day 12/18 on last visit. Due for repeat TSH end of jan. reprinted lab order    Remote h/o throat ca s/p radiation/chemotherapy 2003      ROS:  See HPI, all others negative        Patient Active Problem List   Diagnosis Code    Essential hypertension I10    Acquired hypothyroidism E03.9    Tobacco use Z72.0    CKD (chronic kidney disease) stage 3, GFR 30-59 ml/min ([facility]) N18.3       Current Outpatient Medications:     amLODIPine (NORVASC) 10 mg tablet, Take 1 Tab by mouth daily. , Disp: 90 Tab, Rfl: 0    hydrALAZINE (APRESOLINE) 25 mg tablet, Take 1 Tab by mouth three (3) times daily. , Disp: 270 Tab, Rfl: 0    lovastatin (MEVACOR) 20 mg tablet, Take 2 Tabs by mouth nightly., Disp: 180 Tab, Rfl: 3    levothyroxine (SYNTHROID) 125 mcg tablet, Take 1 Tab by mouth Daily (before breakfast). , Disp: 60 Tab, Rfl: 0    carvedilol (COREG) 6.25 mg tablet, Take 1 Tab by mouth two (2) times daily (with meals). , Disp: 180 Tab, Rfl: 3    lisinopril (PRINIVIL, ZESTRIL) 10 mg tablet, Take 1 Tab by mouth daily. , Disp: 90 Tab, Rfl: 2    levothyroxine (SYNTHROID) 150 mcg tablet, TAKE 1 TABLET BY MOUTH ONCE DAILY BEFORE BREAKFAST, Disp: 90 Tab, Rfl: 0        No Known Allergies    Past Medical History:   Diagnosis Date    Cancer ([facility] [address] 2012    throat    Gout     Hx of colonoscopy with polypectomy 11/15/2016    rpt 5 years 2021, dr. Lucero Toledo    Hypertension     Pancreatitis 1989       Social History     Socioeconomic History    Marital status:      Spouse name: Not on file    Number of children: Not on file    Years of education: Not on file    Highest education level: Not on file   Occupational History    Not on file   Social Needs    Financial resource strain: Not on file    Food insecurity:     Worry: Not on file     Inability: Not on file    Transportation needs:     Medical: Not on file     Non-medical: Not on file   Tobacco Use    Smoking status: Current Every Day Smoker     Packs/day: 0.25     Years: 50.00     Pack years: 12.50    Smokeless tobacco: Never Used   Substance and Sexual Activity    Alcohol use: Yes     Comment: 3    Drug use: No    Sexual activity: Yes     Partners: Female   Lifestyle    Physical activity:     Days per week: Not on file     Minutes per session: Not on file    Stress: Not on file   Relationships    Social connections:     Talks on phone: Not on file     Gets together: Not on file     Attends [employer] service: Not on file     Active member of club or organization: Not on file     Attends meetings of clubs or organizations: Not on file     Relationship status: Not on file    Intimate partner violence:     Fear of current or ex partner: Not on file     Emotionally abused: Not on file     Physically abused: Not on file     Forced sexual activity: Not on file   Other Topics Concern    Not on file   Social History Narrative    Not on file       Family History   Problem Relation Age of Onset    Cancer Father          OBJECTIVE    Physical Exam:   Visit Vitals  /82 (BP 1 Location: Left arm, BP Patient Position: Sitting)   Pulse (!) 55   Temp 98.2 °F (36.8 °C) (Oral)   Resp 16   Ht 5' 6\" (1.676 m)   Wt 157 lb (71.2 kg)   SpO2 96%   BMI 25.34 kg/m²         General: alert, AA, in no apparent distress or pain  Neck: supple, no adenopathy palpated  CVS: normal rate, regular rhythm, distinct S1 and S2  Lungs:clear to ausculation bilaterally, no crackles, wheezing or rhonchi noted  Extremities: no edema, no cyanosis, no flank pain  Skin: warm, no lesions, rashes noted  Psych:  mood and affect normal    ASSESSMENT/PLAN  Diagnoses and all orders for this visit:      Essential hypertension  Much improved  Cont norvasc 10 mg  Cont hydralazine 25 mg tid  cont coreg 6.25 mg   cont lisinopril 10   DASH diet    Hyperlipidemia  Good range on mevacor, to cont  Lipid panel 9/2019    Acquired hypothyroidism  Reduced dose to 125 mcgs/day 12/18 update TSH end of jan     Tobacco use  Counseled on cessation  AAA screen negative 2018  pcv 23 completed  Annual flu vaccine he declines     CKD stage 3  Avoid nsaids  Monitoring, cont lisinopril  Optimize cv risk reduction    · Ff-up in 12 weeks or sooner prn      Patient/guardian understands plan of care. Patient has provided input and agrees with goals. Future labs to be discussed on next visit.

## 2020-01-09 NOTE — PATIENT INSTRUCTIONS
DASH Diet: Care Instructions  Your Care Instructions    The DASH diet is an eating plan that can help lower your blood pressure. DASH stands for Dietary Approaches to Stop Hypertension. Hypertension is high blood pressure. The DASH diet focuses on eating foods that are high in calcium, potassium, and magnesium. These nutrients can lower blood pressure. The foods that are highest in these nutrients are fruits, vegetables, low-fat dairy products, nuts, seeds, and legumes. But taking calcium, potassium, and magnesium supplements instead of eating foods that are high in those nutrients does not have the same effect. The DASH diet also includes whole grains, fish, and poultry. The DASH diet is one of several lifestyle changes your doctor may recommend to lower your high blood pressure. Your doctor may also want you to decrease the amount of sodium in your diet. Lowering sodium while following the DASH diet can lower blood pressure even further than just the DASH diet alone. Follow-up care is a key part of your treatment and safety. Be sure to make and go to all appointments, and call your doctor if you are having problems. It's also a good idea to know your test results and keep a list of the medicines you take. How can you care for yourself at home? Following the DASH diet  · Eat 4 to 5 servings of fruit each day. A serving is 1 medium-sized piece of fruit, ½ cup chopped or canned fruit, 1/4 cup dried fruit, or 4 ounces (½ cup) of fruit juice. Choose fruit more often than fruit juice. · Eat 4 to 5 servings of vegetables each day. A serving is 1 cup of lettuce or raw leafy vegetables, ½ cup of chopped or cooked vegetables, or 4 ounces (½ cup) of vegetable juice. Choose vegetables more often than vegetable juice. · Get 2 to 3 servings of low-fat and fat-free dairy each day. A serving is 8 ounces of milk, 1 cup of yogurt, or 1 ½ ounces of cheese. · Eat 6 to 8 servings of grains each day.  A serving is 1 slice of bread, 1 ounce of dry cereal, or ½ cup of cooked rice, pasta, or cooked cereal. Try to choose whole-grain products as much as possible. · Limit lean meat, poultry, and fish to 2 servings each day. A serving is 3 ounces, about the size of a deck of cards. · Eat 4 to 5 servings of nuts, seeds, and legumes (cooked dried beans, lentils, and split peas) each week. A serving is 1/3 cup of nuts, 2 tablespoons of seeds, or ½ cup of cooked beans or peas. · Limit fats and oils to 2 to 3 servings each day. A serving is 1 teaspoon of vegetable oil or 2 tablespoons of salad dressing. · Limit sweets and added sugars to 5 servings or less a week. A serving is 1 tablespoon jelly or jam, ½ cup sorbet, or 1 cup of lemonade. · Eat less than 2,300 milligrams (mg) of sodium a day. If you limit your sodium to 1,500 mg a day, you can lower your blood pressure even more. Tips for success  · Start small. Do not try to make dramatic changes to your diet all at once. You might feel that you are missing out on your favorite foods and then be more likely to not follow the plan. Make small changes, and stick with them. Once those changes become habit, add a few more changes. · Try some of the following:  ? Make it a goal to eat a fruit or vegetable at every meal and at snacks. This will make it easy to get the recommended amount of fruits and vegetables each day. ? Try yogurt topped with fruit and nuts for a snack or healthy dessert. ? Add lettuce, tomato, cucumber, and onion to sandwiches. ? Combine a ready-made pizza crust with low-fat mozzarella cheese and lots of vegetable toppings. Try using tomatoes, squash, spinach, broccoli, carrots, cauliflower, and onions. ? Have a variety of cut-up vegetables with a low-fat dip as an appetizer instead of chips and dip. ? Sprinkle sunflower seeds or chopped almonds over salads. Or try adding chopped walnuts or almonds to cooked vegetables.   ? Try some vegetarian meals using beans and peas. Add garbanzo or kidney beans to salads. Make burritos and tacos with mashed rodriguez beans or black beans. Where can you learn more? Go to http://mike-marcos.info/. Enter A811 in the search box to learn more about \"DASH Diet: Care Instructions. \"  Current as of: April 9, 2019  Content Version: 12.2  © 7943-7036 [employer]. Care instructions adapted under license by [employer] (which disclaims liability or warranty for this information). If you have questions about a medical condition or this instruction, always ask your healthcare professional. [address] any warranty or liability for your use of this information.

## 2020-01-23 ENCOUNTER — HOSPITAL ENCOUNTER (OUTPATIENT)
Dept: LAB | Age: 75
Discharge: HOME | End: 2020-01-23
Payer: MEDICARE

## 2020-01-23 DIAGNOSIS — E03.9 ACQUIRED HYPOTHYROIDISM: ICD-10-CM

## 2020-01-23 LAB — TSH SERPL DL<=0.05 MIU/L-ACNC: 3.01 UIU/ML (ref 0.36–3.74)

## 2020-01-23 PROCEDURE — 36415 COLL VENOUS BLD VENIPUNCTURE: CPT

## 2020-01-23 PROCEDURE — 84443 ASSAY THYROID STIM HORMONE: CPT

## 2020-01-25 NOTE — PROGRESS NOTES
Thyroid function test is now within normal range.  Continue current dose 125 mcgs/day  Will cont to monitor, pls notify pt

## 2020-02-14 RX ORDER — LEVOTHYROXINE SODIUM 125 UG/1
125 TABLET ORAL
Qty: 90 TAB | Refills: 3 | Status: SHIPPED | OUTPATIENT
Start: 2020-02-14 | End: 2020-12-01 | Stop reason: SDUPTHER

## 2020-02-14 NOTE — TELEPHONE ENCOUNTER
This patient contacted office for the following prescriptions to be filled:    Medication requested : PT IS REQUESTING A 90 DAY SUPPLY   Requested Prescriptions     Pending Prescriptions Disp Refills    levothyroxine (SYNTHROID) 125 mcg tablet 60 Tab 0     Sig: Take 1 Tab by mouth Daily (before breakfast).      PCP: clemente  Pharmacy or Print: [facility]  Mail order or Local pharmacy [phone]    Scheduled appointment if not seen by current providers in office: lov 1/9/2020 ucv 4/9/2020

## 2020-04-09 ENCOUNTER — VIRTUAL VISIT (OUTPATIENT)
Dept: FAMILY MEDICINE CLINIC | Age: 75
End: 2020-04-09

## 2020-04-09 DIAGNOSIS — I10 ESSENTIAL HYPERTENSION: Primary | ICD-10-CM

## 2020-04-09 DIAGNOSIS — E78.00 PURE HYPERCHOLESTEROLEMIA: ICD-10-CM

## 2020-04-09 DIAGNOSIS — N18.30 CKD (CHRONIC KIDNEY DISEASE) STAGE 3, GFR 30-59 ML/MIN (HCC): ICD-10-CM

## 2020-04-09 DIAGNOSIS — Z72.0 TOBACCO USE: ICD-10-CM

## 2020-04-09 DIAGNOSIS — E03.9 ACQUIRED HYPOTHYROIDISM: ICD-10-CM

## 2020-04-09 NOTE — PROGRESS NOTES
Consent: Kathleen Ervin, who was seen by synchronous (real-time) audio-video technology, and/or his healthcare decision maker, is aware that this patient-initiated, Telehealth encounter on 4/9/2020 is a billable service, with coverage as determined by his insurance carrier. He is aware that he may receive a bill and has provided verbal consent to proceed: Yes. 712  Subjective:   Kathleen Ervin is a 76 y.o. male who was seen for No chief complaint on file. Ff-up     HTN with CKD 3-.  reports home BP readings to be 130-140/80-90's. He is taking norvasc, coreg, lisinopril and hydralazine.     HL- Taking mevacor without problems. He continues to smoke about about 3-5 cig/ day. aaa screen neg (2018)     Hypothyroidism-taking 125 mcgs/day, TSH 1/2020 wnl.      Remote h/o throat ca s/p radiation/chemotherapy 2003    Prior to Admission medications    Medication Sig Start Date End Date Taking? Authorizing Provider   levothyroxine (SYNTHROID) 125 mcg tablet Take 1 Tab by mouth Daily (before breakfast). 2/14/20  Yes Leora Harper MD   amLODIPine (NORVASC) 10 mg tablet Take 1 Tab by mouth daily. 1/9/20  Yes Leora Harper MD   hydrALAZINE (APRESOLINE) 25 mg tablet Take 1 Tab by mouth three (3) times daily. 12/18/19  Yes Leora Harpre MD   lovastatin (MEVACOR) 20 mg tablet Take 2 Tabs by mouth nightly. 12/18/19  Yes Leora Harper MD   carvedilol (COREG) 6.25 mg tablet Take 1 Tab by mouth two (2) times daily (with meals). 12/18/19  Yes Leora Harper MD   lisinopril (PRINIVIL, ZESTRIL) 10 mg tablet Take 1 Tab by mouth daily.  10/21/19  Yes Leora Harper MD     No Known Allergies    Patient Active Problem List    Diagnosis Date Noted    CKD (chronic kidney disease) stage 3, GFR 30-59 ml/min ([facility] [address] 04/17/2018    Essential hypertension 03/27/2018    Acquired hypothyroidism 03/27/2018    Tobacco use 03/27/2018     Current Outpatient Medications   Medication Sig Dispense Refill    levothyroxine (SYNTHROID) 125 mcg tablet Take 1 Tab by mouth Daily (before breakfast). 90 Tab 3    amLODIPine (NORVASC) 10 mg tablet Take 1 Tab by mouth daily. 90 Tab 0    hydrALAZINE (APRESOLINE) 25 mg tablet Take 1 Tab by mouth three (3) times daily. 270 Tab 0    lovastatin (MEVACOR) 20 mg tablet Take 2 Tabs by mouth nightly. 180 Tab 3    carvedilol (COREG) 6.25 mg tablet Take 1 Tab by mouth two (2) times daily (with meals). 180 Tab 3    lisinopril (PRINIVIL, ZESTRIL) 10 mg tablet Take 1 Tab by mouth daily. 90 Tab 2     No Known Allergies  Past Medical History:   Diagnosis Date    Cancer ([address] 2012    throat    Gout     Hx of colonoscopy with polypectomy 11/15/2016    rpt 5 years 2021, dr. Gricelda Larios    Hypertension     Pancreatitis 1989    Thyroid disease      Past Surgical History:   Procedure Laterality Date    ABDOMEN SURGERY PROC UNLISTED      HX COLONOSCOPY  11/15/2016    repeat 5 years 2021, Dr Hazel Maher polyp     Family History   Problem Relation Age of Onset    Cancer Father      Social History     Tobacco Use    Smoking status: Current Every Day Smoker     Packs/day: 0.25     Years: 50.00     Pack years: 12.50    Smokeless tobacco: Never Used   Substance Use Topics    Alcohol use: Yes     Comment: 3       ROS      Objective: There were no vitals taken for this visit. General: alert, cooperative, no distress   Mental  status: normal mood, behavior, speech, dress, motor activity, and thought processes, able to follow commands   HENT: NCAT   Neck: no visualized mass   Resp: no respiratory distress   Neuro: no gross deficits   Skin: no discoloration or lesions of concern on visible areas   Psychiatric: normal affect, consistent with stated mood, no evidence of hallucinations     Additional exam findings: We discussed the expected course, resolution and complications of the diagnosis(es) in detail. Medication risks, benefits, costs, interactions, and alternatives were discussed as indicated.   I advised him to contact the office if his condition worsens, changes or fails to improve as anticipated. He expressed understanding with the diagnosis(es) and plan. Roosevelt Muñiz is a 76 y.o. male being evaluated by a video visit encounter for concerns as above. A caregiver was present when appropriate. Due to this being a TeleHealth encounter (During HJXSE-64 public health emergency), evaluation of the following organ systems was limited: Vitals/Constitutional/EENT/Resp/CV/GI//MS/Neuro/Skin/Heme-Lymph-Imm. Pursuant to the emergency declaration under the [facility], [facility] waiver authority and the [employer] and [employer] Act, this Virtual  Visit was conducted, with patient's (and/or legal guardian's) consent, to reduce the patient's risk of exposure to COVID-19 and provide necessary medical care. Services were provided through a video synchronous discussion virtually to substitute for in-person clinic visit. Patient and provider were located at their individual homes. Diagnoses and all orders for this visit:    1. Essential hypertension  Controlled  Cont current regimen norvasc, hydralazine, coreg, lisinopril  Fasting labs prior to next visit  DASH diet  -     METABOLIC PANEL, COMPREHENSIVE; Future  -     LIPID PANEL; Future    2. Pure hypercholesterolemia  Good range on mevacor  -     LIPID PANEL; Future    3. Acquired hypothyroidism  Stable  Cont 125 mcgs/day  TSH wnl 1/2020    4. CKD (chronic kidney disease) stage 3, GFR 30-59 ml/min ([facility])  Monitoring    5.  Tobacco use  Discussed complete cessation    Ff-up in 3 months, fasting labs prior to next visit, Plan on [address] then      Trinh Solis MD

## 2020-04-09 NOTE — PATIENT INSTRUCTIONS
DASH Diet: Care Instructions Your Care Instructions The DASH diet is an eating plan that can help lower your blood pressure. DASH stands for Dietary Approaches to Stop Hypertension. Hypertension is high blood pressure. The DASH diet focuses on eating foods that are high in calcium, potassium, and magnesium. These nutrients can lower blood pressure. The foods that are highest in these nutrients are fruits, vegetables, low-fat dairy products, nuts, seeds, and legumes. But taking calcium, potassium, and magnesium supplements instead of eating foods that are high in those nutrients does not have the same effect. The DASH diet also includes whole grains, fish, and poultry. The DASH diet is one of several lifestyle changes your doctor may recommend to lower your high blood pressure. Your doctor may also want you to decrease the amount of sodium in your diet. Lowering sodium while following the DASH diet can lower blood pressure even further than just the DASH diet alone. Follow-up care is a key part of your treatment and safety. Be sure to make and go to all appointments, and call your doctor if you are having problems. It's also a good idea to know your test results and keep a list of the medicines you take. How can you care for yourself at home? Following the DASH diet · Eat 4 to 5 servings of fruit each day. A serving is 1 medium-sized piece of fruit, ½ cup chopped or canned fruit, 1/4 cup dried fruit, or 4 ounces (½ cup) of fruit juice. Choose fruit more often than fruit juice. · Eat 4 to 5 servings of vegetables each day. A serving is 1 cup of lettuce or raw leafy vegetables, ½ cup of chopped or cooked vegetables, or 4 ounces (½ cup) of vegetable juice. Choose vegetables more often than vegetable juice. · Get 2 to 3 servings of low-fat and fat-free dairy each day. A serving is 8 ounces of milk, 1 cup of yogurt, or 1 ½ ounces of cheese. · Eat 6 to 8 servings of grains each day. A serving is 1 slice of bread, 1 ounce of dry cereal, or ½ cup of cooked rice, pasta, or cooked cereal. Try to choose whole-grain products as much as possible. · Limit lean meat, poultry, and fish to 2 servings each day. A serving is 3 ounces, about the size of a deck of cards. · Eat 4 to 5 servings of nuts, seeds, and legumes (cooked dried beans, lentils, and split peas) each week. A serving is 1/3 cup of nuts, 2 tablespoons of seeds, or ½ cup of cooked beans or peas. · Limit fats and oils to 2 to 3 servings each day. A serving is 1 teaspoon of vegetable oil or 2 tablespoons of salad dressing. · Limit sweets and added sugars to 5 servings or less a week. A serving is 1 tablespoon jelly or jam, ½ cup sorbet, or 1 cup of lemonade. · Eat less than 2,300 milligrams (mg) of sodium a day. If you limit your sodium to 1,500 mg a day, you can lower your blood pressure even more. Tips for success · Start small. Do not try to make dramatic changes to your diet all at once. You might feel that you are missing out on your favorite foods and then be more likely to not follow the plan. Make small changes, and stick with them. Once those changes become habit, add a few more changes. · Try some of the following: ? Make it a goal to eat a fruit or vegetable at every meal and at snacks. This will make it easy to get the recommended amount of fruits and vegetables each day. ? Try yogurt topped with fruit and nuts for a snack or healthy dessert. ? Add lettuce, tomato, cucumber, and onion to sandwiches. ? Combine a ready-made pizza crust with low-fat mozzarella cheese and lots of vegetable toppings. Try using tomatoes, squash, spinach, broccoli, carrots, cauliflower, and onions. ? Have a variety of cut-up vegetables with a low-fat dip as an appetizer instead of chips and dip. ? Sprinkle sunflower seeds or chopped almonds over salads.  Or try adding chopped walnuts or almonds to cooked vegetables. ? Try some vegetarian meals using beans and peas. Add garbanzo or kidney beans to salads. Make burritos and tacos with mashed rodriguez beans or black beans. Where can you learn more? Go to http://mike-marcos.info/ Enter K442 in the search box to learn more about \"DASH Diet: Care Instructions. \" Current as of: December 15, 2019Content Version: 12.4 © 1018-9777 [employer]. Care instructions adapted under license by [employer] (which disclaims liability or warranty for this information). If you have questions about a medical condition or this instruction, always ask your healthcare professional. [address] any warranty or liability for your use of this information.

## 2020-04-24 ENCOUNTER — VIRTUAL VISIT (OUTPATIENT)
Dept: FAMILY MEDICINE CLINIC | Age: 75
End: 2020-04-24

## 2020-04-24 DIAGNOSIS — R21 RASH: Primary | ICD-10-CM

## 2020-04-24 RX ORDER — CLOBETASOL PROPIONATE 0.5 MG/G
OINTMENT TOPICAL 2 TIMES DAILY
Qty: 30 G | Refills: 0 | Status: SHIPPED | OUTPATIENT
Start: 2020-04-24 | End: 2020-12-01 | Stop reason: ALTCHOICE

## 2020-04-24 NOTE — PROGRESS NOTES
Kianna Gastelum is a 76 y.o. male who was seen by synchronous (real-time) audio-video technology on 4/24/2020. Consent: Kianna Gastelum, who was seen by synchronous (real-time) audio-video technology, and/or his healthcare decision maker, is aware that this patient-initiated, Telehealth encounter on 4/24/2020 is a billable service, with coverage as determined by his insurance carrier. He is aware that he may receive a bill and has provided verbal consent to proceed: Yes. Subjective:   Kianna Gastelum is a 76 y.o. male who was seen for No chief complaint on file. Rash x 1 month    C/o itchy bumpy rash on lower back extending to left hip. He does not recall any new food, medication, topical agents. No fever, joint pains. No sick contacts, shares bed with wife without problems  Has not tried anything for this    Prior to Admission medications    Medication Sig Start Date End Date Taking? Authorizing Provider   clobetasoL (TEMOVATE) 0.05 % ointment Apply  to affected area two (2) times a day. 4/24/20  Yes Nilam Oliva MD   levothyroxine (SYNTHROID) 125 mcg tablet Take 1 Tab by mouth Daily (before breakfast). 2/14/20   Nilam Oliva MD   amLODIPine (NORVASC) 10 mg tablet Take 1 Tab by mouth daily. 1/9/20   Nilam Oliva MD   hydrALAZINE (APRESOLINE) 25 mg tablet Take 1 Tab by mouth three (3) times daily. 12/18/19   Nilam Oliva MD   lovastatin (MEVACOR) 20 mg tablet Take 2 Tabs by mouth nightly. 12/18/19   Nilam Oliva MD   carvedilol (COREG) 6.25 mg tablet Take 1 Tab by mouth two (2) times daily (with meals). 12/18/19   Nilam Oliva MD   lisinopril (PRINIVIL, ZESTRIL) 10 mg tablet Take 1 Tab by mouth daily. 10/21/19   Nilam Oliva MD     No Known Allergies        ROS      Objective: There were no vitals taken for this visit.    General: alert, cooperative, no distress   Mental  status: normal mood, behavior, speech, dress, motor activity, and thought processes, able to follow commands   HENT: NCAT   Neck: no visualized mass   Resp: no respiratory distress   Neuro: no gross deficits   Skin: no discoloration or lesions of concern on visible areas   Psychiatric: normal affect, consistent with stated mood, no evidence of hallucinations     Additional exam findings:   Flesh colored papules with some hyperpigmentation on lower back, no noticeable scaling or redness    We discussed the expected course, resolution and complications of the diagnosis(es) in detail. Medication risks, benefits, costs, interactions, and alternatives were discussed as indicated. I advised him to contact the office if his condition worsens, changes or fails to improve as anticipated. He expressed understanding with the diagnosis(es) and plan. Jonna Reed is a 76 y.o. male who was evaluated by a video visit encounter for concerns as above. Patient identification was verified prior to start of the visit. A caregiver was present when appropriate. Due to this being a TeleHealth encounter (During DWBUA-53 public health emergency), evaluation of the following organ systems was limited: Vitals/Constitutional/EENT/Resp/CV/GI//MS/Neuro/Skin/Heme-Lymph-Imm. Pursuant to the emergency declaration under the [facility], 1135 waiver authority and the [employer] and [employer] Act, this Virtual  Visit was conducted, with patient's (and/or legal guardian's) consent, to reduce the patient's risk of exposure to COVID-19 and provide necessary medical care. Services were provided through a video synchronous discussion virtually to substitute for in-person clinic visit. Patient and provider were located at their individual homes. Diagnoses and all orders for this visit:    1. Rash  Start:  -     clobetasoL (TEMOVATE) 0.05 % ointment; Apply  to affected area two (2) times a day.   If not better in 2 weeks or worsens, he is to call us and plan to refer to dermatology      Alessandro Estrada MD

## 2020-05-04 RX ORDER — AMLODIPINE BESYLATE 10 MG/1
10 TABLET ORAL DAILY
Qty: 90 TAB | Refills: 2 | Status: SHIPPED | OUTPATIENT
Start: 2020-05-04 | End: 2020-11-09 | Stop reason: SDUPTHER

## 2020-05-19 ENCOUNTER — VIRTUAL VISIT (OUTPATIENT)
Dept: FAMILY MEDICINE CLINIC | Age: 75
End: 2020-05-19

## 2020-05-19 DIAGNOSIS — R21 RASH: Primary | ICD-10-CM

## 2020-05-19 RX ORDER — CEPHALEXIN 500 MG/1
500 CAPSULE ORAL 2 TIMES DAILY
Qty: 14 CAP | Refills: 0 | Status: SHIPPED | OUTPATIENT
Start: 2020-05-19 | End: 2020-05-26

## 2020-05-19 NOTE — PROGRESS NOTES
Alberto Seaman is a 76 y.o. male who was seen by synchronous (real-time) audio-video technology on 5/19/2020. Consent: Alberto Seaman, who was seen by synchronous (real-time) audio-video technology, and/or his healthcare decision maker, is aware that this patient-initiated, Telehealth encounter on 5/19/2020 is a billable service, with coverage as determined by his insurance carrier. He is aware that he may receive a bill and has provided verbal consent to proceed: Yes. 712  Subjective:   Alberot Seaman is a 76 y.o. male who was seen for Rash (going up his back )  rash ff-up    Reports buttock rash responded to clobetasol from last visit. However developed itchy, painful L lower back rash, worse with trial clobetasol. No new topical agent or medication or food. No fever. Prior to Admission medications    Medication Sig Start Date End Date Taking? Authorizing Provider   terbinafine 1 % topical gel Apply thin layer twice day 5/19/20  Yes Candido Duenas MD   cephALEXin (KEFLEX) 500 mg capsule Take 1 Cap by mouth two (2) times a day for 7 days. 5/19/20 5/26/20 Yes Avtar Canada MD   amLODIPine (NORVASC) 10 mg tablet Take 1 Tab by mouth daily. 5/4/20  Yes Candido Duenas MD   clobetasoL (TEMOVATE) 0.05 % ointment Apply  to affected area two (2) times a day. 4/24/20  Yes Candido Duenas MD   levothyroxine (SYNTHROID) 125 mcg tablet Take 1 Tab by mouth Daily (before breakfast). 2/14/20  Yes Candido Duenas MD   hydrALAZINE (APRESOLINE) 25 mg tablet Take 1 Tab by mouth three (3) times daily. 12/18/19  Yes Candido Duenas MD   lovastatin (MEVACOR) 20 mg tablet Take 2 Tabs by mouth nightly. 12/18/19  Yes Candido Duenas MD   carvedilol (COREG) 6.25 mg tablet Take 1 Tab by mouth two (2) times daily (with meals). 12/18/19  Yes Candido Duenas MD   lisinopril (PRINIVIL, ZESTRIL) 10 mg tablet Take 1 Tab by mouth daily.  10/21/19  Yes Candido Duenas MD     No Known Allergies    Patient Active Problem List    Diagnosis Date Noted    CKD (chronic kidney disease) stage 3, GFR 30-59 ml/min ([facility]) 04/17/2018    Essential hypertension 03/27/2018    Acquired hypothyroidism 03/27/2018    Tobacco use 03/27/2018     Current Outpatient Medications   Medication Sig Dispense Refill    terbinafine 1 % topical gel Apply thin layer twice day 1 Tube 0    cephALEXin (KEFLEX) 500 mg capsule Take 1 Cap by mouth two (2) times a day for 7 days. 14 Cap 0    amLODIPine (NORVASC) 10 mg tablet Take 1 Tab by mouth daily. 90 Tab 2    clobetasoL (TEMOVATE) 0.05 % ointment Apply  to affected area two (2) times a day. 30 g 0    levothyroxine (SYNTHROID) 125 mcg tablet Take 1 Tab by mouth Daily (before breakfast). 90 Tab 3    hydrALAZINE (APRESOLINE) 25 mg tablet Take 1 Tab by mouth three (3) times daily. 270 Tab 0    lovastatin (MEVACOR) 20 mg tablet Take 2 Tabs by mouth nightly. 180 Tab 3    carvedilol (COREG) 6.25 mg tablet Take 1 Tab by mouth two (2) times daily (with meals). 180 Tab 3    lisinopril (PRINIVIL, ZESTRIL) 10 mg tablet Take 1 Tab by mouth daily. 90 Tab 2     No Known Allergies  Past Medical History:   Diagnosis Date    Cancer ([address] 2012    throat    Gout     Hx of colonoscopy with polypectomy 11/15/2016    rpt 5 years 2021, dr. Oscar Galvan    Hypertension     Pancreatitis 1989    Thyroid disease      Past Surgical History:   Procedure Laterality Date    ABDOMEN SURGERY PROC UNLISTED      HX COLONOSCOPY  11/15/2016    repeat 5 years 2021, Dr Kat Hanks polyp     Family History   Problem Relation Age of Onset    Cancer Father      Social History     Tobacco Use    Smoking status: Current Every Day Smoker     Packs/day: 0.25     Years: 50.00     Pack years: 12.50    Smokeless tobacco: Never Used   Substance Use Topics    Alcohol use: Yes     Comment: 3       ROS      Objective: There were no vitals taken for this visit.    General: alert, cooperative, no distress   Mental  status: normal mood, behavior, speech, dress, motor activity, and thought processes, able to follow commands   HENT: NCAT   Neck: no visualized mass   Resp: no respiratory distress   Neuro: no gross deficits   Skin: no discoloration or lesions of concern on visible areas   Psychiatric: normal affect, consistent with stated mood, no evidence of hallucinations     Additional exam findings:   + whitish papular rash on L lower back area    We discussed the expected course, resolution and complications of the diagnosis(es) in detail. Medication risks, benefits, costs, interactions, and alternatives were discussed as indicated. I advised him to contact the office if his condition worsens, changes or fails to improve as anticipated. He expressed understanding with the diagnosis(es) and plan. Anirudh Caraballo is a 76 y.o. male who was evaluated by a video visit encounter for concerns as above. Patient identification was verified prior to start of the visit. A caregiver was present when appropriate. Due to this being a TeleHealth encounter (During FVBMM-64 public health emergency), evaluation of the following organ systems was limited: Vitals/Constitutional/EENT/Resp/CV/GI//MS/Neuro/Skin/Heme-Lymph-Imm. Pursuant to the emergency declaration under the [facility], 1135 waiver authority and the [employer] and [employer] Act, this Virtual  Visit was conducted, with patient's (and/or legal guardian's) consent, to reduce the patient's risk of exposure to COVID-19 and provide necessary medical care. Services were provided through a video synchronous discussion virtually to substitute for in-person clinic visit. Patient and provider were located at their individual homes. Assessment & Plan:   Diagnoses and all orders for this visit:    1.  Rash  Trial:  Start keflex to cover for bacterial infection  Topical antifungal  -     REFERRAL TO DERMATOLOGY    Other orders  -     terbinafine 1 % topical gel; Apply thin layer twice day  -     cephALEXin (KEFLEX) 500 mg capsule; Take 1 Cap by mouth two (2) times a day for 7 days.       Ff-up in 2 months, plan for [address] gloria Estrada MD

## 2020-05-19 NOTE — PATIENT INSTRUCTIONS
DASH Diet: Care Instructions Your Care Instructions The DASH diet is an eating plan that can help lower your blood pressure. DASH stands for Dietary Approaches to Stop Hypertension. Hypertension is high blood pressure. The DASH diet focuses on eating foods that are high in calcium, potassium, and magnesium. These nutrients can lower blood pressure. The foods that are highest in these nutrients are fruits, vegetables, low-fat dairy products, nuts, seeds, and legumes. But taking calcium, potassium, and magnesium supplements instead of eating foods that are high in those nutrients does not have the same effect. The DASH diet also includes whole grains, fish, and poultry. The DASH diet is one of several lifestyle changes your doctor may recommend to lower your high blood pressure. Your doctor may also want you to decrease the amount of sodium in your diet. Lowering sodium while following the DASH diet can lower blood pressure even further than just the DASH diet alone. Follow-up care is a key part of your treatment and safety. Be sure to make and go to all appointments, and call your doctor if you are having problems. It's also a good idea to know your test results and keep a list of the medicines you take. How can you care for yourself at home? Following the DASH diet · Eat 4 to 5 servings of fruit each day. A serving is 1 medium-sized piece of fruit, ½ cup chopped or canned fruit, 1/4 cup dried fruit, or 4 ounces (½ cup) of fruit juice. Choose fruit more often than fruit juice. · Eat 4 to 5 servings of vegetables each day. A serving is 1 cup of lettuce or raw leafy vegetables, ½ cup of chopped or cooked vegetables, or 4 ounces (½ cup) of vegetable juice. Choose vegetables more often than vegetable juice. · Get 2 to 3 servings of low-fat and fat-free dairy each day. A serving is 8 ounces of milk, 1 cup of yogurt, or 1 ½ ounces of cheese. · Eat 6 to 8 servings of grains each day. A serving is 1 slice of bread, 1 ounce of dry cereal, or ½ cup of cooked rice, pasta, or cooked cereal. Try to choose whole-grain products as much as possible. · Limit lean meat, poultry, and fish to 2 servings each day. A serving is 3 ounces, about the size of a deck of cards. · Eat 4 to 5 servings of nuts, seeds, and legumes (cooked dried beans, lentils, and split peas) each week. A serving is 1/3 cup of nuts, 2 tablespoons of seeds, or ½ cup of cooked beans or peas. · Limit fats and oils to 2 to 3 servings each day. A serving is 1 teaspoon of vegetable oil or 2 tablespoons of salad dressing. · Limit sweets and added sugars to 5 servings or less a week. A serving is 1 tablespoon jelly or jam, ½ cup sorbet, or 1 cup of lemonade. · Eat less than 2,300 milligrams (mg) of sodium a day. If you limit your sodium to 1,500 mg a day, you can lower your blood pressure even more. Tips for success · Start small. Do not try to make dramatic changes to your diet all at once. You might feel that you are missing out on your favorite foods and then be more likely to not follow the plan. Make small changes, and stick with them. Once those changes become habit, add a few more changes. · Try some of the following: ? Make it a goal to eat a fruit or vegetable at every meal and at snacks. This will make it easy to get the recommended amount of fruits and vegetables each day. ? Try yogurt topped with fruit and nuts for a snack or healthy dessert. ? Add lettuce, tomato, cucumber, and onion to sandwiches. ? Combine a ready-made pizza crust with low-fat mozzarella cheese and lots of vegetable toppings. Try using tomatoes, squash, spinach, broccoli, carrots, cauliflower, and onions. ? Have a variety of cut-up vegetables with a low-fat dip as an appetizer instead of chips and dip. ? Sprinkle sunflower seeds or chopped almonds over salads.  Or try adding chopped walnuts or almonds to cooked vegetables. ? Try some vegetarian meals using beans and peas. Add garbanzo or kidney beans to salads. Make burritos and tacos with mashed rodriguez beans or black beans. Where can you learn more? Go to http://mike-marcos.info/ Enter Q352 in the search box to learn more about \"DASH Diet: Care Instructions. \" Current as of: December 15, 2019Content Version: 12.4 © 6835-3675 [employer]. Care instructions adapted under license by [employer] (which disclaims liability or warranty for this information). If you have questions about a medical condition or this instruction, always ask your healthcare professional. [address] any warranty or liability for your use of this information.

## 2020-11-09 RX ORDER — AMLODIPINE BESYLATE 10 MG/1
10 TABLET ORAL DAILY
Qty: 90 TAB | Refills: 2 | Status: SHIPPED | OUTPATIENT
Start: 2020-11-09 | End: 2021-02-11 | Stop reason: SINTOL

## 2020-11-09 NOTE — TELEPHONE ENCOUNTER
This patient contacted office for the following prescriptions to be filled:    Medication requested :   Requested Prescriptions     Pending Prescriptions Disp Refills    amLODIPine (NORVASC) 10 mg tablet 90 Tab 2     Sig: Take 1 Tab by mouth daily.      PCP: [address] [place] [address] or Print:  [facility]   Mail order or Local pharmacy [address]     Scheduled appointment if not seen by current providers in office: LOV 5/19/2020 f/u 12/1/2020

## 2020-12-01 ENCOUNTER — VIRTUAL VISIT (OUTPATIENT)
Dept: FAMILY MEDICINE CLINIC | Age: 75
End: 2020-12-01
Payer: MEDICARE

## 2020-12-01 DIAGNOSIS — R93.89 ABNORMAL CXR: ICD-10-CM

## 2020-12-01 DIAGNOSIS — Z71.89 ADVANCE CARE PLANNING: ICD-10-CM

## 2020-12-01 DIAGNOSIS — Z00.00 MEDICARE ANNUAL WELLNESS VISIT, SUBSEQUENT: ICD-10-CM

## 2020-12-01 DIAGNOSIS — R07.9 CHEST PAIN, UNSPECIFIED TYPE: ICD-10-CM

## 2020-12-01 DIAGNOSIS — Z72.0 TOBACCO USE: ICD-10-CM

## 2020-12-01 DIAGNOSIS — N18.31 STAGE 3A CHRONIC KIDNEY DISEASE (HCC): ICD-10-CM

## 2020-12-01 DIAGNOSIS — I10 ESSENTIAL HYPERTENSION: ICD-10-CM

## 2020-12-01 DIAGNOSIS — E03.9 ACQUIRED HYPOTHYROIDISM: ICD-10-CM

## 2020-12-01 DIAGNOSIS — R07.9 CHEST PAIN, UNSPECIFIED TYPE: Primary | ICD-10-CM

## 2020-12-01 PROCEDURE — 3017F COLORECTAL CA SCREEN DOC REV: CPT | Performed by: FAMILY MEDICINE

## 2020-12-01 PROCEDURE — G8536 NO DOC ELDER MAL SCRN: HCPCS | Performed by: FAMILY MEDICINE

## 2020-12-01 PROCEDURE — G8419 CALC BMI OUT NRM PARAM NOF/U: HCPCS | Performed by: FAMILY MEDICINE

## 2020-12-01 PROCEDURE — 99214 OFFICE O/P EST MOD 30 MIN: CPT | Performed by: FAMILY MEDICINE

## 2020-12-01 PROCEDURE — G8427 DOCREV CUR MEDS BY ELIG CLIN: HCPCS | Performed by: FAMILY MEDICINE

## 2020-12-01 PROCEDURE — G0439 PPPS, SUBSEQ VISIT: HCPCS | Performed by: FAMILY MEDICINE

## 2020-12-01 PROCEDURE — G0463 HOSPITAL OUTPT CLINIC VISIT: HCPCS | Performed by: FAMILY MEDICINE

## 2020-12-01 PROCEDURE — 1101F PT FALLS ASSESS-DOCD LE1/YR: CPT | Performed by: FAMILY MEDICINE

## 2020-12-01 PROCEDURE — G8756 NO BP MEASURE DOC: HCPCS | Performed by: FAMILY MEDICINE

## 2020-12-01 PROCEDURE — G8432 DEP SCR NOT DOC, RNG: HCPCS | Performed by: FAMILY MEDICINE

## 2020-12-01 RX ORDER — LEVOTHYROXINE SODIUM 125 UG/1
125 TABLET ORAL
Qty: 90 TAB | Refills: 0 | Status: SHIPPED | OUTPATIENT
Start: 2020-12-01 | End: 2021-02-23 | Stop reason: DRUGHIGH

## 2020-12-01 NOTE — PATIENT INSTRUCTIONS
DASH Diet: Care Instructions Your Care Instructions The DASH diet is an eating plan that can help lower your blood pressure. DASH stands for Dietary Approaches to Stop Hypertension. Hypertension is high blood pressure. The DASH diet focuses on eating foods that are high in calcium, potassium, and magnesium. These nutrients can lower blood pressure. The foods that are highest in these nutrients are fruits, vegetables, low-fat dairy products, nuts, seeds, and legumes. But taking calcium, potassium, and magnesium supplements instead of eating foods that are high in those nutrients does not have the same effect. The DASH diet also includes whole grains, fish, and poultry. The DASH diet is one of several lifestyle changes your doctor may recommend to lower your high blood pressure. Your doctor may also want you to decrease the amount of sodium in your diet. Lowering sodium while following the DASH diet can lower blood pressure even further than just the DASH diet alone. Follow-up care is a key part of your treatment and safety. Be sure to make and go to all appointments, and call your doctor if you are having problems. It's also a good idea to know your test results and keep a list of the medicines you take. How can you care for yourself at home? Following the DASH diet · Eat 4 to 5 servings of fruit each day. A serving is 1 medium-sized piece of fruit, ½ cup chopped or canned fruit, 1/4 cup dried fruit, or 4 ounces (½ cup) of fruit juice. Choose fruit more often than fruit juice. · Eat 4 to 5 servings of vegetables each day. A serving is 1 cup of lettuce or raw leafy vegetables, ½ cup of chopped or cooked vegetables, or 4 ounces (½ cup) of vegetable juice. Choose vegetables more often than vegetable juice. · Get 2 to 3 servings of low-fat and fat-free dairy each day. A serving is 8 ounces of milk, 1 cup of yogurt, or 1 ½ ounces of cheese. · Eat 6 to 8 servings of grains each day. A serving is 1 slice of bread, 1 ounce of dry cereal, or ½ cup of cooked rice, pasta, or cooked cereal. Try to choose whole-grain products as much as possible. · Limit lean meat, poultry, and fish to 2 servings each day. A serving is 3 ounces, about the size of a deck of cards. · Eat 4 to 5 servings of nuts, seeds, and legumes (cooked dried beans, lentils, and split peas) each week. A serving is 1/3 cup of nuts, 2 tablespoons of seeds, or ½ cup of cooked beans or peas. · Limit fats and oils to 2 to 3 servings each day. A serving is 1 teaspoon of vegetable oil or 2 tablespoons of salad dressing. · Limit sweets and added sugars to 5 servings or less a week. A serving is 1 tablespoon jelly or jam, ½ cup sorbet, or 1 cup of lemonade. · Eat less than 2,300 milligrams (mg) of sodium a day. If you limit your sodium to 1,500 mg a day, you can lower your blood pressure even more. Tips for success · Start small. Do not try to make dramatic changes to your diet all at once. You might feel that you are missing out on your favorite foods and then be more likely to not follow the plan. Make small changes, and stick with them. Once those changes become habit, add a few more changes. · Try some of the following: ? Make it a goal to eat a fruit or vegetable at every meal and at snacks. This will make it easy to get the recommended amount of fruits and vegetables each day. ? Try yogurt topped with fruit and nuts for a snack or healthy dessert. ? Add lettuce, tomato, cucumber, and onion to sandwiches. ? Combine a ready-made pizza crust with low-fat mozzarella cheese and lots of vegetable toppings. Try using tomatoes, squash, spinach, broccoli, carrots, cauliflower, and onions. ? Have a variety of cut-up vegetables with a low-fat dip as an appetizer instead of chips and dip. ? Sprinkle sunflower seeds or chopped almonds over salads.  Or try adding chopped walnuts or almonds to cooked vegetables. ? Try some vegetarian meals using beans and peas. Add garbanzo or kidney beans to salads. Make burritos and tacos with mashed rodriguez beans or black beans. Where can you learn more? Go to http://www.gray.com/ Enter B817 in the search box to learn more about \"DASH Diet: Care Instructions. \" Current as of: December 16, 2019               Content Version: 12.6 © 1064-2598 [employer]. Care instructions adapted under license by [employer] (which disclaims liability or warranty for this information). If you have questions about a medical condition or this instruction, always ask your healthcare professional. [address] any warranty or liability for your use of this information.

## 2020-12-01 NOTE — PROGRESS NOTES
1. Have you been to the ER, urgent care clinic since your last visit? Hospitalized since your last visit? No    2. Have you seen or consulted any other health care providers outside of the [address] [place] [address] since your last visit? Include any pap smears or colon screening. No    This is the Subsequent Medicare Annual Wellness Exam, performed 12 months or more after the Initial AWV or the last Subsequent AWV    I have reviewed the patient's medical history in detail and updated the computerized patient record. History     Past Medical History:   Diagnosis Date    Cancer ([address] 2012    throat    Gout     Hx of colonoscopy with polypectomy 11/15/2016    rpt 5 years 2021, dr. Primo Mitchell    Hypertension     Pancreatitis 1989    Thyroid disease       Past Surgical History:   Procedure Laterality Date    ABDOMEN SURGERY PROC UNLISTED      HX COLONOSCOPY  11/15/2016    repeat 5 years 2021, Dr Ryan Tamez polyp     Current Outpatient Medications   Medication Sig Dispense Refill    levothyroxine (SYNTHROID) 125 mcg tablet Take 1 Tab by mouth Daily (before breakfast). 90 Tab 0    amLODIPine (NORVASC) 10 mg tablet Take 1 Tab by mouth daily. 90 Tab 2    hydrALAZINE (APRESOLINE) 25 mg tablet Take 1 Tab by mouth three (3) times daily. 270 Tab 0    lovastatin (MEVACOR) 20 mg tablet Take 2 Tabs by mouth nightly. 180 Tab 3    carvedilol (COREG) 6.25 mg tablet Take 1 Tab by mouth two (2) times daily (with meals). 180 Tab 3    lisinopril (PRINIVIL, ZESTRIL) 10 mg tablet Take 1 Tab by mouth daily.  80 Tab 2     No Known Allergies  Family History   Problem Relation Age of Onset    Cancer Father      Social History     Tobacco Use    Smoking status: Current Every Day Smoker     Packs/day: 0.25     Years: 50.00     Pack years: 12.50    Smokeless tobacco: Never Used   Substance Use Topics    Alcohol use: Yes     Comment: 3     Patient Active Problem List   Diagnosis Code    Essential hypertension I10    Acquired hypothyroidism E03.9    Tobacco use Z72.0    CKD (chronic kidney disease) stage 3, GFR 30-59 ml/min N18.30       Depression Risk Factor Screening:     3 most recent PHQ Screens 4/9/2020   Little interest or pleasure in doing things Not at all   Feeling down, depressed, irritable, or hopeless Not at all   Total Score PHQ 2 0     Alcohol Risk Factor Screening: You do not drink alcohol or very rarely. Functional Ability and Level of Safety:   Hearing Loss  Hearing is good. Activities of Daily Living  The home contains: no safety equipment. Patient does total self care    Fall Risk  Fall Risk Assessment, last 12 mths 4/9/2020   Able to walk? Yes   Fall in past 12 months?  No       Abuse Screen  Patient is not abused    Cognitive Screening   Evaluation of Cognitive Function:  Has your family/caregiver stated any concerns about your memory: no      Patient Care Team   Patient Care Team:  Emily Lau MD as PCP - General (Family Medicine)  Emily Lau MD as PCP - [facility] Empaneled Provider  Gian Jackson MD (Gastroenterology)  DEBRA Love (Optometry)  Monroe Diamond DO (Dermatology)    Assessment/Plan   Education and counseling provided:  Are appropriate based on today's review and evaluation  End-of-Life planning (with patient's consent)- discussed, provided form  Pneumococcal Vaccine- 13 and 23 completed  Influenza Vaccine- annually, advised to get in local pharmacy, pt declines   Colorectal cancer screening tests- 2016   Cardiovascular screening blood test- utd  Screening for glaucoma-due, pt reminded        Health Maintenance Due   Topic Date Due    DTaP/Tdap/Td series (1 - Tdap) 11/08/1966    Shingrix Vaccine Age 50> (1 of 2) 11/08/1995    GLAUCOMA SCREENING Q2Y  04/23/2020    Medicare Yearly Exam  05/03/2020    Flu Vaccine (1) 09/01/2020    Lipid Screen  09/18/2020         Leora Patel, 76 y.o.,  male        Patient Active Problem List   Diagnosis Code    Essential hypertension I10    Acquired hypothyroidism E03.9    Tobacco use Z72.0    CKD (chronic kidney disease) stage 3, GFR 30-59 ml/min N18.30       Current Outpatient Medications   Medication Sig Dispense Refill    levothyroxine (SYNTHROID) 125 mcg tablet Take 1 Tab by mouth Daily (before breakfast). 90 Tab 0    amLODIPine (NORVASC) 10 mg tablet Take 1 Tab by mouth daily. 90 Tab 2    hydrALAZINE (APRESOLINE) 25 mg tablet Take 1 Tab by mouth three (3) times daily. 270 Tab 0    lovastatin (MEVACOR) 20 mg tablet Take 2 Tabs by mouth nightly. 180 Tab 3    carvedilol (COREG) 6.25 mg tablet Take 1 Tab by mouth two (2) times daily (with meals). 180 Tab 3    lisinopril (PRINIVIL, ZESTRIL) 10 mg tablet Take 1 Tab by mouth daily.  90 Tab 2       No Known Allergies    Past Medical History:   Diagnosis Date    [address] [address] 2012    throat    Gout     Hx of colonoscopy with polypectomy 11/15/2016    rpt 5 years 2021, dr. Gayatri Kaplan    Hypertension     Pancreatitis 1989    Thyroid disease        Social History     Socioeconomic History    Marital status:      Spouse name: Not on file    Number of children: Not on file    Years of education: Not on file    Highest education level: Not on file   Occupational History    Not on file   Social Needs    Financial resource strain: Not on file    Food insecurity     Worry: Not on file     Inability: Not on file   [employer] needs     Medical: Not on file     Non-medical: Not on file   Tobacco Use    Smoking status: Current Every Day Smoker     Packs/day: 0.25     Years: 50.00     Pack years: 12.50    Smokeless tobacco: Never Used   Substance and Sexual Activity    Alcohol use: Yes     Comment: 3    Drug use: No    Sexual activity: Yes     Partners: Female   Lifestyle    Physical activity     Days per week: Not on file     Minutes per session: Not on file    Stress: Not on file   Relationships    Social connections     Talks on phone: Not on file     Gets together: Not on file     Attends Yarsani service: Not on file     Active member of club or organization: Not on file     Attends meetings of clubs or organizations: Not on file     Relationship status: Not on file    Intimate partner violence     Fear of current or ex partner: Not on file     Emotionally abused: Not on file     Physically abused: Not on file     Forced sexual activity: Not on file   Other Topics Concern    Not on file   Social History Narrative    Not on file       Family History   Problem Relation Age of Onset    Cancer Father          OBJECTIVE    Physical Exam:     There were no vitals taken for this visit. General: alert, AA, in no apparent distress or pain  Neck: supple, no adenopathy palpated  CVS: normal rate, regular rhythm, distinct S1 and S2  Lungs:clear to ausculation bilaterally, no crackles, wheezing or rhonchi noted  Extremities: no edema, no cyanosis,  Skin: warm, no lesions, rashes noted  Psych:  mood and affect normal      Diagnoses and all orders for this visit:    Chest pain, unspecified type  Sounds atypical  But he has risk factors  Will r/o anginal pain  -     NUCLEAR CARDIAC STRESS TEST; Future  Stephanie Sterling is a 76 y.o. male who was seen by synchronous (real-time) audio-video technology on 12/1/2020. Consent: Stephanie Sterling, who was seen by synchronous (real-time) audio-video technology, and/or his healthcare decision maker, is aware that this patient-initiated, Telehealth encounter on 12/1/2020 is a billable service, with coverage as determined by his insurance carrier. He is aware that he may receive a bill and has provided verbal consent to proceed: Yes . 712  Subjective:   Stephanie Sterling is a 76 y.o. male who was seen for No chief complaint on file. SUBJECTIVE  Ff-up    HTN-taking medications without problems, norvasc, hydralazine, coreg. Pt with h/o CKD.  (norvasc too costly)  He was seen in ED for chest pain 8/2020, says does not recall exact symptoms but felt like indigestion at rest. Reviewed notes Serial ekg and trop neg, cxr with questionable viral pneumonitis. Mild hypokalemia, elevated BP. Says symptoms have completely resolved. No intervention. He sedentary though, does not usually exert himself. He is a smoker. HL- Taking mevacor without problems. He is a chronic smoker, about 5 cig/ day. aaa screen neg    Hypothyroidism-reports  compliance with lt4 replacement. Reviewed labs elevated TSH     Remote h/o throat ca s/p radiation/chemotherapy 2003    ROS:  See HPI, all others negative        Prior to Admission medications    Medication Sig Start Date End Date Taking? Authorizing Provider   levothyroxine (SYNTHROID) 125 mcg tablet Take 1 Tab by mouth Daily (before breakfast). 12/1/20  Yes Lu Ryan MD   amLODIPine (NORVASC) 10 mg tablet Take 1 Tab by mouth daily. 11/9/20  Yes Lu Ryan MD   hydrALAZINE (APRESOLINE) 25 mg tablet Take 1 Tab by mouth three (3) times daily. 12/18/19  Yes Lu Ryan MD   lovastatin (MEVACOR) 20 mg tablet Take 2 Tabs by mouth nightly. 12/18/19  Yes Lu Ryan MD   carvedilol (COREG) 6.25 mg tablet Take 1 Tab by mouth two (2) times daily (with meals). 12/18/19  Yes Lu Ryan MD   lisinopril (PRINIVIL, ZESTRIL) 10 mg tablet Take 1 Tab by mouth daily. 10/21/19  Yes Lu Ryan MD   terbinafine 1 % topical gel Apply thin layer twice day 5/19/20 12/1/20  Lu Ryan MD   clobetasoL (TEMOVATE) 0.05 % ointment Apply  to affected area two (2) times a day. 4/24/20 12/1/20  Lu Ryan MD   levothyroxine (SYNTHROID) 125 mcg tablet Take 1 Tab by mouth Daily (before breakfast).  2/14/20 12/1/20  Lu Ryan MD     No Known Allergies    Patient Active Problem List    Diagnosis Date Noted    CKD (chronic kidney disease) stage 3, GFR 30-59 ml/min 04/17/2018    Essential hypertension 03/27/2018    Acquired hypothyroidism 03/27/2018    Tobacco use 03/27/2018     Current Outpatient Medications   Medication Sig Dispense Refill    levothyroxine (SYNTHROID) 125 mcg tablet Take 1 Tab by mouth Daily (before breakfast). 90 Tab 0    amLODIPine (NORVASC) 10 mg tablet Take 1 Tab by mouth daily. 90 Tab 2    hydrALAZINE (APRESOLINE) 25 mg tablet Take 1 Tab by mouth three (3) times daily. 270 Tab 0    lovastatin (MEVACOR) 20 mg tablet Take 2 Tabs by mouth nightly. 180 Tab 3    carvedilol (COREG) 6.25 mg tablet Take 1 Tab by mouth two (2) times daily (with meals). 180 Tab 3    lisinopril (PRINIVIL, ZESTRIL) 10 mg tablet Take 1 Tab by mouth daily. 90 Tab 2     No Known Allergies  Past Medical History:   Diagnosis Date    Cancer ([facility] [address] 2012    throat    Gout     Hx of colonoscopy with polypectomy 11/15/2016    rpt 5 years 2021, dr. Wayne Banegas    Hypertension     Pancreatitis 1989    Thyroid disease      Past Surgical History:   Procedure Laterality Date    ABDOMEN SURGERY PROC UNLISTED      HX COLONOSCOPY  11/15/2016    repeat 5 years 2021, Dr Tre Glover polyp     Family History   Problem Relation Age of Onset    Cancer Father      Social History     Tobacco Use    Smoking status: Current Every Day Smoker     Packs/day: 0.25     Years: 50.00     Pack years: 12.50    Smokeless tobacco: Never Used   Substance Use Topics    Alcohol use: Yes     Comment: 3       ROS      Objective: There were no vitals taken for this visit. General: alert, cooperative, no distress   Mental  status: normal mood, behavior, speech, dress, motor activity, and thought processes, able to follow commands   HENT: NCAT   Neck: no visualized mass   Resp: no respiratory distress   Neuro: no gross deficits   Skin: no discoloration or lesions of concern on visible areas   Psychiatric: normal affect, consistent with stated mood, no evidence of hallucinations     Additional exam findings: We discussed the expected course, resolution and complications of the diagnosis(es) in detail. Medication risks, benefits, costs, interactions, and alternatives were discussed as indicated. I advised him to contact the office if his condition worsens, changes or fails to improve as anticipated. He expressed understanding with the diagnosis(es) and plan. Veronica Johnson is a 76 y.o. male who was evaluated by a video visit encounter for concerns as above. Patient identification was verified prior to start of the visit. A caregiver was present when appropriate. Due to this being a TeleHealth encounter (During YNDWH-55 public health emergency), evaluation of the following organ systems was limited: Vitals/Constitutional/EENT/Resp/CV/GI//MS/Neuro/Skin/Heme-Lymph-Imm. Pursuant to the emergency declaration under the [facility], [facility] waiver authority and the [employer] Act, this Virtual  Visit was conducted, with patient's (and/or legal guardian's) consent, to reduce the patient's risk of exposure to COVID-19 and provide necessary medical care. Services were provided through a video synchronous discussion virtually to substitute for in-person clinic visit. Patient and provider were located at their individual homes. Assessment & Plan:      Abnormal CXR  H/o 8/2020  recheck-     XR CHEST PA LAT; Future    Tobacco use  Discussed complete cessation    Stage 3a chronic kidney disease  Avoid nsaids monitoring    Acquired hypothyroidism  Cont lt4 replacement  Monitoring  -     TSH 3RD GENERATION; Future     Essential hypertension  Prev controlled  Advised monitoring BP at home, during ED visit elevated  Cont hydralazine, norvasc, coreg  -     METABOLIC PANEL, COMPREHENSIVE; Future  -     LIPID PANEL; Future    Other orders  -     levothyroxine (SYNTHROID) 125 mcg tablet; Take 1 Tab by mouth Daily (before breakfast). Ff-up in 4 weeks    Patient/guardian understands plan of care.  Patient has provided input and agrees with goals. Future labs to be discussed on next visit.

## 2020-12-03 ENCOUNTER — HOSPITAL ENCOUNTER (OUTPATIENT)
Dept: LAB | Age: 75
Discharge: HOME | End: 2020-12-03
Payer: MEDICARE

## 2020-12-03 LAB
ALBUMIN SERPL-MCNC: 2.9 G/DL (ref 3.4–5)
ALBUMIN/GLOB SERPL: 0.9 {RATIO} (ref 0.8–1.7)
ALP SERPL-CCNC: 109 U/L (ref 45–117)
ALT SERPL-CCNC: 9 U/L (ref 16–61)
ANION GAP SERPL CALC-SCNC: 3 MMOL/L (ref 3–18)
AST SERPL-CCNC: 13 U/L (ref 10–38)
BILIRUB SERPL-MCNC: 0.3 MG/DL (ref 0.2–1)
BUN SERPL-MCNC: 16 MG/DL (ref 7–18)
BUN/CREAT SERPL: 11 (ref 12–20)
CALCIUM SERPL-MCNC: 8.6 MG/DL (ref 8.5–10.1)
CHLORIDE SERPL-SCNC: 110 MMOL/L (ref 100–111)
CHOLEST SERPL-MCNC: 176 MG/DL
CO2 SERPL-SCNC: 29 MMOL/L (ref 21–32)
CREAT SERPL-MCNC: 1.47 MG/DL (ref 0.6–1.3)
GLOBULIN SER CALC-MCNC: 3.3 G/DL (ref 2–4)
GLUCOSE SERPL-MCNC: 75 MG/DL (ref 74–99)
HDLC SERPL-MCNC: 56 MG/DL (ref 40–60)
HDLC SERPL: 3.1 {RATIO} (ref 0–5)
LDLC SERPL CALC-MCNC: 102.2 MG/DL (ref 0–100)
LIPID PROFILE,FLP: ABNORMAL
POTASSIUM SERPL-SCNC: 3.6 MMOL/L (ref 3.5–5.5)
PROT SERPL-MCNC: 6.2 G/DL (ref 6.4–8.2)
SODIUM SERPL-SCNC: 142 MMOL/L (ref 136–145)
TRIGL SERPL-MCNC: 89 MG/DL (ref ?–150)
TSH SERPL DL<=0.05 MIU/L-ACNC: 4.55 UIU/ML (ref 0.36–3.74)
VLDLC SERPL CALC-MCNC: 17.8 MG/DL

## 2020-12-03 PROCEDURE — 80061 LIPID PANEL: CPT

## 2020-12-03 PROCEDURE — 36415 COLL VENOUS BLD VENIPUNCTURE: CPT

## 2020-12-03 PROCEDURE — 80053 COMPREHEN METABOLIC PANEL: CPT

## 2020-12-03 PROCEDURE — 84443 ASSAY THYROID STIM HORMONE: CPT

## 2020-12-03 NOTE — PROGRESS NOTES
Thyroid levels appear hypothryoid. Has been taking synthroid regularly? Has had compliance issues in the past  If yes, then will need to increase dose  If no, reiterate compliance  I would want to see him after cardiac testing around early jan. delfino quigley then.

## 2020-12-08 ENCOUNTER — TELEPHONE (OUTPATIENT)
Dept: CARDIOLOGY CLINIC | Age: 75
End: 2020-12-08

## 2020-12-09 LAB
STRESS BASELINE DIAS BP: 80 MMHG
STRESS BASELINE HR: 59 BPM
STRESS BASELINE SYS BP: 140 MMHG
STRESS PEAK DIAS BP: 80 MMHG
STRESS PEAK SYS BP: 162 MMHG
STRESS PERCENT HR ACHIEVED: 61 %
STRESS POST PEAK HR: 88 BPM
STRESS RATE PRESSURE PRODUCT: NORMAL BPM*MMHG
STRESS ST DEPRESSION: 0 MM
STRESS ST ELEVATION: 0 MM
STRESS STAGE 1 BP: NORMAL MMHG
STRESS STAGE 1 DURATION: 3 MIN:SEC
STRESS STAGE 1 HR: 88 BPM
STRESS STAGE RECOVERY 1 BP: NORMAL MMHG
STRESS STAGE RECOVERY 1 DURATION: 1 MIN:SEC
STRESS STAGE RECOVERY 1 HR: 77 BPM
STRESS TARGET HR: 145 BPM

## 2020-12-15 DIAGNOSIS — R07.9 CHEST PAIN, UNSPECIFIED TYPE: Primary | ICD-10-CM

## 2020-12-15 NOTE — PROGRESS NOTES
Cardiac stress test-intermediate result ( non diagnostic) I will advise cardiology evaluation  Also remind on repeat CXR.

## 2020-12-18 ENCOUNTER — TELEPHONE (OUTPATIENT)
Dept: FAMILY MEDICINE CLINIC | Age: 75
End: 2020-12-18

## 2020-12-18 NOTE — TELEPHONE ENCOUNTER
ACP Specialist called pt to initiate conversation re: Advance Care Planning and completing a [place] for [employer]. He answered the phone, stated he was aware that Dr. Shamika Tadeo office would be making the referral to have this conversation, but he was not interested. ACP Specialist attempted to provide more information about what the Advance Directive is, however he said he was not interested and did not want to talk any more about this. Referral closed.      Valentina Gonzáles, MSW, LCSW  ACP Specialist  Advance Care Planning Team  [phone]) [phone]

## 2020-12-18 NOTE — PROGRESS NOTES
Patient identified with 2 identifiers (name and ).   Patient aware of Cardiac stress test-intermediate result ( non diagnostic) I will advise cardiology evaluation   Patient was advised to have CXR done

## 2020-12-18 NOTE — PROGRESS NOTES
Patient identified with 2 identifiers (name and ). patientt aware of Thyroid levels appear hypothryoid. Patient states he has been compliant with taking his synthroid daily not missing doses taking same time. Patient uses [facility]  Patient is also inquiring about his prescription for Viagra.  Please advise

## 2020-12-21 ENCOUNTER — HOSPITAL ENCOUNTER (OUTPATIENT)
Dept: GENERAL RADIOLOGY | Age: 75
Discharge: HOME | End: 2020-12-21
Payer: MEDICARE

## 2020-12-21 DIAGNOSIS — F17.200 SMOKER: ICD-10-CM

## 2020-12-21 DIAGNOSIS — R93.89 ABNORMAL CXR: ICD-10-CM

## 2020-12-21 DIAGNOSIS — R93.89 ABNORMAL CXR: Primary | ICD-10-CM

## 2020-12-21 PROCEDURE — 71046 X-RAY EXAM CHEST 2 VIEWS: CPT

## 2020-12-21 NOTE — PROGRESS NOTES
No acute findings on CXR. Possible previous old lung infection/scarring  Noted on L upper lobe. Because he is a smoker, I would suggest we look into this further with lung CT, order placed  pls notify pt.

## 2020-12-22 ENCOUNTER — TELEPHONE (OUTPATIENT)
Dept: FAMILY MEDICINE CLINIC | Age: 75
End: 2020-12-22

## 2020-12-22 NOTE — TELEPHONE ENCOUNTER
Yes 150 dose, will not rx  Ok to hold on ct  Will have cardiology evaluate prior to ordering viagra. (0) independent

## 2020-12-22 NOTE — PROGRESS NOTES
Patient identified with 2 identifiers (name and ). Patient aware of No acute findings on CXR. Possible previous old lung infection/scarring  Noted on L upper lobe. Because he is a smoker, I would suggest we look into this further with lung CT. Patient states that he does not want to have CT scan done.  Patient said that he can not afford all the testing and he would discuss with you at his next appt in 2021

## 2020-12-22 NOTE — TELEPHONE ENCOUNTER
Sary Peraza LPN     5:87 PM EST      Patient identified with 2 identifiers (name and ). patientt aware of Thyroid levels appear hypothryoid. Patient states he has been compliant with taking his synthroid daily not missing doses taking same time. Patient uses [facility]  Patient is also inquiring about his prescription for Viagra. Please advise     Patient states if you are going to increase to 150 mcg to please let him know because he has plenty of 150 mcg pills at home. Does not want a prescription sent to the pharmacy.  Patient is requesting Viagra again

## 2020-12-22 NOTE — TELEPHONE ENCOUNTER
Patient identified with 2 identifiers (name and ). Patient spouse aware patient id to take the 150 mcg's of synthroid. Aware to see cardiology before viagra will be ordered.

## 2021-01-26 ENCOUNTER — TELEPHONE (OUTPATIENT)
Dept: FAMILY MEDICINE CLINIC | Age: 76
End: 2021-01-26

## 2021-01-26 NOTE — TELEPHONE ENCOUNTER
Pt states that he is suppose to be taking the Levothroxine  150mcg  not 125 mcg . Can you please check on this and advise. Aman.  If it is 150 MCG

## 2021-01-27 NOTE — TELEPHONE ENCOUNTER
Patient identified with 2 identifiers (name and ). Spoke with patient and verified patient is taking 150 mcg of Synthroid  Spoke with [facility] pharmacy and verified 150 mcg of synthroid.

## 2021-02-11 ENCOUNTER — OFFICE VISIT (OUTPATIENT)
Dept: FAMILY MEDICINE CLINIC | Age: 76
End: 2021-02-11
Payer: MEDICARE

## 2021-02-11 ENCOUNTER — HOSPITAL ENCOUNTER (OUTPATIENT)
Dept: LAB | Age: 76
Discharge: HOME | End: 2021-02-11
Payer: MEDICARE

## 2021-02-11 VITALS
HEIGHT: 66 IN | TEMPERATURE: 97.8 F | DIASTOLIC BLOOD PRESSURE: 98 MMHG | BODY MASS INDEX: 25.39 KG/M2 | RESPIRATION RATE: 16 BRPM | WEIGHT: 158 LBS | OXYGEN SATURATION: 98 % | HEART RATE: 76 BPM | SYSTOLIC BLOOD PRESSURE: 158 MMHG

## 2021-02-11 DIAGNOSIS — E03.9 ACQUIRED HYPOTHYROIDISM: ICD-10-CM

## 2021-02-11 DIAGNOSIS — R93.89 ABNORMAL CXR: ICD-10-CM

## 2021-02-11 DIAGNOSIS — I10 ESSENTIAL HYPERTENSION: ICD-10-CM

## 2021-02-11 DIAGNOSIS — I10 ESSENTIAL HYPERTENSION: Primary | ICD-10-CM

## 2021-02-11 DIAGNOSIS — R07.9 CHEST PAIN, UNSPECIFIED TYPE: ICD-10-CM

## 2021-02-11 DIAGNOSIS — Z72.0 TOBACCO USE: ICD-10-CM

## 2021-02-11 DIAGNOSIS — N18.31 STAGE 3A CHRONIC KIDNEY DISEASE (HCC): ICD-10-CM

## 2021-02-11 LAB
ANION GAP SERPL CALC-SCNC: 5 MMOL/L (ref 3–18)
BUN SERPL-MCNC: 15 MG/DL (ref 7–18)
BUN/CREAT SERPL: 10 (ref 12–20)
CALCIUM SERPL-MCNC: 8.7 MG/DL (ref 8.5–10.1)
CHLORIDE SERPL-SCNC: 110 MMOL/L (ref 100–111)
CO2 SERPL-SCNC: 29 MMOL/L (ref 21–32)
CREAT SERPL-MCNC: 1.52 MG/DL (ref 0.6–1.3)
GLUCOSE SERPL-MCNC: 79 MG/DL (ref 74–99)
POTASSIUM SERPL-SCNC: 4.1 MMOL/L (ref 3.5–5.5)
SODIUM SERPL-SCNC: 144 MMOL/L (ref 136–145)
TSH SERPL DL<=0.05 MIU/L-ACNC: 2.72 UIU/ML (ref 0.36–3.74)

## 2021-02-11 PROCEDURE — G8755 DIAS BP > OR = 90: HCPCS | Performed by: FAMILY MEDICINE

## 2021-02-11 PROCEDURE — 84443 ASSAY THYROID STIM HORMONE: CPT

## 2021-02-11 PROCEDURE — 1101F PT FALLS ASSESS-DOCD LE1/YR: CPT | Performed by: FAMILY MEDICINE

## 2021-02-11 PROCEDURE — G8536 NO DOC ELDER MAL SCRN: HCPCS | Performed by: FAMILY MEDICINE

## 2021-02-11 PROCEDURE — G0463 HOSPITAL OUTPT CLINIC VISIT: HCPCS | Performed by: FAMILY MEDICINE

## 2021-02-11 PROCEDURE — 99214 OFFICE O/P EST MOD 30 MIN: CPT | Performed by: FAMILY MEDICINE

## 2021-02-11 PROCEDURE — G8419 CALC BMI OUT NRM PARAM NOF/U: HCPCS | Performed by: FAMILY MEDICINE

## 2021-02-11 PROCEDURE — G8510 SCR DEP NEG, NO PLAN REQD: HCPCS | Performed by: FAMILY MEDICINE

## 2021-02-11 PROCEDURE — 3017F COLORECTAL CA SCREEN DOC REV: CPT | Performed by: FAMILY MEDICINE

## 2021-02-11 PROCEDURE — G8427 DOCREV CUR MEDS BY ELIG CLIN: HCPCS | Performed by: FAMILY MEDICINE

## 2021-02-11 PROCEDURE — G8753 SYS BP > OR = 140: HCPCS | Performed by: FAMILY MEDICINE

## 2021-02-11 PROCEDURE — 36415 COLL VENOUS BLD VENIPUNCTURE: CPT

## 2021-02-11 PROCEDURE — 80048 BASIC METABOLIC PNL TOTAL CA: CPT

## 2021-02-11 RX ORDER — LOVASTATIN 20 MG/1
40 TABLET ORAL
Qty: 180 TAB | Refills: 3 | Status: SHIPPED | OUTPATIENT
Start: 2021-02-11

## 2021-02-11 RX ORDER — HYDRALAZINE HYDROCHLORIDE 25 MG/1
25 TABLET, FILM COATED ORAL 3 TIMES DAILY
Qty: 270 TAB | Refills: 0 | Status: SHIPPED | OUTPATIENT
Start: 2021-02-11 | End: 2021-02-12

## 2021-02-11 RX ORDER — CARVEDILOL 6.25 MG/1
6.25 TABLET ORAL 2 TIMES DAILY WITH MEALS
Qty: 180 TAB | Refills: 3 | Status: SHIPPED | OUTPATIENT
Start: 2021-02-11 | End: 2021-02-12

## 2021-02-11 NOTE — PROGRESS NOTES
1. Have you been to the ER, urgent care clinic since your last visit? Hospitalized since your last visit? No    2. Have you seen or consulted any other health care providers outside of the [address] [place] [address] since your last visit? Include any pap smears or colon screening.  No

## 2021-02-11 NOTE — PROGRESS NOTES
Thyroid function is normal, cont current dose 125 mcgs/day  Kidney function is about the same, will continue to monitor  pls notify pt

## 2021-02-11 NOTE — PATIENT INSTRUCTIONS
DASH Diet: Care Instructions Your Care Instructions The DASH diet is an eating plan that can help lower your blood pressure. DASH stands for Dietary Approaches to Stop Hypertension. Hypertension is high blood pressure. The DASH diet focuses on eating foods that are high in calcium, potassium, and magnesium. These nutrients can lower blood pressure. The foods that are highest in these nutrients are fruits, vegetables, low-fat dairy products, nuts, seeds, and legumes. But taking calcium, potassium, and magnesium supplements instead of eating foods that are high in those nutrients does not have the same effect. The DASH diet also includes whole grains, fish, and poultry. The DASH diet is one of several lifestyle changes your doctor may recommend to lower your high blood pressure. Your doctor may also want you to decrease the amount of sodium in your diet. Lowering sodium while following the DASH diet can lower blood pressure even further than just the DASH diet alone. Follow-up care is a key part of your treatment and safety. Be sure to make and go to all appointments, and call your doctor if you are having problems. It's also a good idea to know your test results and keep a list of the medicines you take. How can you care for yourself at home? Following the DASH diet · Eat 4 to 5 servings of fruit each day. A serving is 1 medium-sized piece of fruit, ½ cup chopped or canned fruit, 1/4 cup dried fruit, or 4 ounces (½ cup) of fruit juice. Choose fruit more often than fruit juice. · Eat 4 to 5 servings of vegetables each day. A serving is 1 cup of lettuce or raw leafy vegetables, ½ cup of chopped or cooked vegetables, or 4 ounces (½ cup) of vegetable juice. Choose vegetables more often than vegetable juice. · Get 2 to 3 servings of low-fat and fat-free dairy each day. A serving is 8 ounces of milk, 1 cup of yogurt, or 1 ½ ounces of cheese. · Eat 6 to 8 servings of grains each day. A serving is 1 slice of bread, 1 ounce of dry cereal, or ½ cup of cooked rice, pasta, or cooked cereal. Try to choose whole-grain products as much as possible. · Limit lean meat, poultry, and fish to 2 servings each day. A serving is 3 ounces, about the size of a deck of cards. · Eat 4 to 5 servings of nuts, seeds, and legumes (cooked dried beans, lentils, and split peas) each week. A serving is 1/3 cup of nuts, 2 tablespoons of seeds, or ½ cup of cooked beans or peas. · Limit fats and oils to 2 to 3 servings each day. A serving is 1 teaspoon of vegetable oil or 2 tablespoons of salad dressing. · Limit sweets and added sugars to 5 servings or less a week. A serving is 1 tablespoon jelly or jam, ½ cup sorbet, or 1 cup of lemonade. · Eat less than 2,300 milligrams (mg) of sodium a day. If you limit your sodium to 1,500 mg a day, you can lower your blood pressure even more. Tips for success · Start small. Do not try to make dramatic changes to your diet all at once. You might feel that you are missing out on your favorite foods and then be more likely to not follow the plan. Make small changes, and stick with them. Once those changes become habit, add a few more changes. · Try some of the following: ? Make it a goal to eat a fruit or vegetable at every meal and at snacks. This will make it easy to get the recommended amount of fruits and vegetables each day. ? Try yogurt topped with fruit and nuts for a snack or healthy dessert. ? Add lettuce, tomato, cucumber, and onion to sandwiches. ? Combine a ready-made pizza crust with low-fat mozzarella cheese and lots of vegetable toppings. Try using tomatoes, squash, spinach, broccoli, carrots, cauliflower, and onions. ? Have a variety of cut-up vegetables with a low-fat dip as an appetizer instead of chips and dip. ? Sprinkle sunflower seeds or chopped almonds over salads. Or try adding chopped walnuts or almonds to cooked vegetables. ? Try some vegetarian meals using beans and peas. Add garbanzo or kidney beans to salads. Make burritos and tacos with mashed rodriguez beans or black beans. Where can you learn more? Go to http://www.gray.com/ Enter F558 in the search box to learn more about \"DASH Diet: Care Instructions. \" Current as of: December 16, 2019               Content Version: 12.6 © 0616-3303 [employer]. Care instructions adapted under license by [employer] (which disclaims liability or warranty for this information). If you have questions about a medical condition or this instruction, always ask your healthcare professional. [address] any warranty or liability for your use of this information.

## 2021-02-12 ENCOUNTER — OFFICE VISIT (OUTPATIENT)
Dept: CARDIOLOGY CLINIC | Age: 76
End: 2021-02-12
Payer: MEDICARE

## 2021-02-12 VITALS
DIASTOLIC BLOOD PRESSURE: 98 MMHG | TEMPERATURE: 97.2 F | SYSTOLIC BLOOD PRESSURE: 197 MMHG | HEART RATE: 60 BPM | WEIGHT: 159.2 LBS | HEIGHT: 66 IN | BODY MASS INDEX: 25.58 KG/M2

## 2021-02-12 DIAGNOSIS — N18.31 STAGE 3A CHRONIC KIDNEY DISEASE (HCC): ICD-10-CM

## 2021-02-12 DIAGNOSIS — E78.5 HYPERLIPIDEMIA, UNSPECIFIED HYPERLIPIDEMIA TYPE: ICD-10-CM

## 2021-02-12 DIAGNOSIS — I42.9 CARDIOMYOPATHY, UNSPECIFIED TYPE (HCC): ICD-10-CM

## 2021-02-12 DIAGNOSIS — I10 ESSENTIAL HYPERTENSION: ICD-10-CM

## 2021-02-12 DIAGNOSIS — R07.9 CHEST PAIN, UNSPECIFIED TYPE: Primary | ICD-10-CM

## 2021-02-12 DIAGNOSIS — R94.39 ABNORMAL STRESS TEST: ICD-10-CM

## 2021-02-12 PROCEDURE — G8427 DOCREV CUR MEDS BY ELIG CLIN: HCPCS | Performed by: INTERNAL MEDICINE

## 2021-02-12 PROCEDURE — 1101F PT FALLS ASSESS-DOCD LE1/YR: CPT | Performed by: INTERNAL MEDICINE

## 2021-02-12 PROCEDURE — G8419 CALC BMI OUT NRM PARAM NOF/U: HCPCS | Performed by: INTERNAL MEDICINE

## 2021-02-12 PROCEDURE — G8755 DIAS BP > OR = 90: HCPCS | Performed by: INTERNAL MEDICINE

## 2021-02-12 PROCEDURE — 3017F COLORECTAL CA SCREEN DOC REV: CPT | Performed by: INTERNAL MEDICINE

## 2021-02-12 PROCEDURE — G8753 SYS BP > OR = 140: HCPCS | Performed by: INTERNAL MEDICINE

## 2021-02-12 PROCEDURE — G8432 DEP SCR NOT DOC, RNG: HCPCS | Performed by: INTERNAL MEDICINE

## 2021-02-12 PROCEDURE — G8536 NO DOC ELDER MAL SCRN: HCPCS | Performed by: INTERNAL MEDICINE

## 2021-02-12 PROCEDURE — 99204 OFFICE O/P NEW MOD 45 MIN: CPT | Performed by: INTERNAL MEDICINE

## 2021-02-12 RX ORDER — HYDRALAZINE HYDROCHLORIDE 50 MG/1
50 TABLET, FILM COATED ORAL 3 TIMES DAILY
Qty: 270 TAB | Refills: 3 | Status: SHIPPED | OUTPATIENT
Start: 2021-02-12 | End: 2022-05-27 | Stop reason: SDUPTHER

## 2021-02-12 RX ORDER — CARVEDILOL 12.5 MG/1
12.5 TABLET ORAL 2 TIMES DAILY WITH MEALS
Qty: 180 TAB | Refills: 3 | Status: SHIPPED | OUTPATIENT
Start: 2021-02-12 | End: 2021-03-11 | Stop reason: DRUGHIGH

## 2021-02-12 NOTE — PROGRESS NOTES
HISTORY OF PRESENT ILLNESS  Taras Walsh is a 75 y.o. male.    2/12/2021  Patient seen today for new patient evaluation.  He is referred here for evaluation of chest pain.  Patient was in emergency room May 2020 with episodes of chest pain.  Patient sharp pain in the chest at that time.  Since that time he has not had any recurrence of chest pain.  He denies any shortness of breath, dizziness palpitation or other symptom.  Patient subsequent stress test in 12/2020 that was reported abnormal due to low ejection fraction.  It was thought to be under calculation possibly from LVH.  Perfusion was reported normal.      Review of Systems   Constitutional: Negative for chills and fever.   HENT: Negative for nosebleeds.    Eyes: Negative for blurred vision and double vision.   Respiratory: Negative for cough, hemoptysis, sputum production, shortness of breath and wheezing.    Cardiovascular: Positive for chest pain. Negative for palpitations, orthopnea, claudication, leg swelling and PND.   Gastrointestinal: Negative for abdominal pain, heartburn, nausea and vomiting.   Musculoskeletal: Negative for myalgias.   Skin: Negative for rash.   Neurological: Negative for dizziness, weakness and headaches.   Endo/Heme/Allergies: Does not bruise/bleed easily.     Family History   Problem Relation Age of Onset   • Cancer Father        Past Medical History:   Diagnosis Date   • Cancer (HCC) 2012    throat   • Gout    • Hx of colonoscopy with polypectomy 11/15/2016    rpt 5 years 2021, dr. cortes   • Hypertension    • Pancreatitis 1989   • Thyroid disease        Past Surgical History:   Procedure Laterality Date   • HX COLONOSCOPY  11/15/2016    repeat 5 years 2021, Dr Gal Cortes polyp   • [place] ABDOMEN SURGERY PROC UNLISTED         Social History     Tobacco Use   • Smoking status: Current Every Day Smoker     Packs/day: 0.25     Years: 50.00     Pack years: 12.50   • Smokeless tobacco: Never Used   Substance Use Topics   • Alcohol  use: Yes     Comment: 3       Allergies   Allergen Reactions    Lisinopril Rash    Norvasc [Amlodipine] Itching       Prior to Admission medications    Medication Sig Start Date End Date Taking? Authorizing Provider   carvediloL (COREG) 12.5 mg tablet Take 1 Tab by mouth two (2) times daily (with meals). 2/12/21  Yes Viviane Massey MD   hydrALAZINE (APRESOLINE) 50 mg tablet Take 1 Tab by mouth three (3) times daily. 2/12/21  Yes Viviane Massey MD   lovastatin (MEVACOR) 20 mg tablet Take 2 Tabs by mouth nightly. 2/11/21  Yes Demario Brown MD   levothyroxine (SYNTHROID) 125 mcg tablet Take 1 Tab by mouth Daily (before breakfast). 12/1/20  Yes Demario Brown MD         Visit Vitals  BP (!) 197/98   Pulse 60   Temp 97.2 °F (36.2 °C) (Temporal)   Ht 5' 6\" (1.676 m)   Wt 72.2 kg (159 lb 3.2 oz)   BMI 25.70 kg/m²         Physical Exam   Constitutional: He is oriented to person, place, and time. He appears well-developed and well-nourished. HENT:   Head: Normocephalic and atraumatic. Eyes: Conjunctivae are normal.   Neck: Neck supple. No JVD present. No tracheal deviation present. No thyromegaly present. Cardiovascular: Normal rate, regular rhythm and normal heart sounds. Exam reveals no gallop and no friction rub. No murmur heard. Pulmonary/Chest: Breath sounds normal. No respiratory distress. He has no wheezes. He has no rales. He exhibits no tenderness. Abdominal: Soft. There is no abdominal tenderness. Musculoskeletal:         General: No edema. Neurological: He is alert and oriented to person, place, and time. Skin: Skin is warm and dry. Psychiatric: He has a normal mood and affect. Mr. Mathieu Casas has a reminder for a \"due or due soon\" health maintenance. I have asked that he contact his primary care provider for follow-up on this health maintenance. No flowsheet data found.   I have personally reviewed patient's records available from hospital and other providers and incorporated findings in patient care. Notes, lab, EKG, stress test, chest x-ray  Procedure Conclusion 12/2020    Nuclear Stress Test    Nuclear Cardiac Spect Rest then Gated Stress study. Daryle Block was used as the stressing method and agent. (Daryle Block given via a 10 - 20 sec injection.)   One day myocardial perfusion study. Date: 12/9/2020. Left ventricular perfusion is normal. Myocardial perfusion imaging supports an intermediate risk stress test.   There is no prior study available for comparison. .   Interpretation Summary 12/2020    · Baseline ECG: Sinus bradycardia. LVH with repolorization; possible left atrial enlargement  · Gated SPECT: Left ventricular function post-stress was abnormal. Calculated ejection fraction is 47%. There is no evidence of transient ischemic dilation (TID). The TID ratio is 1.17. · Left ventricular perfusion is normal, no ischemia or previous infarct. · Myocardial perfusion imaging supports an intermediate risk stress test due to calculated EF of 47%. Significant LVH present, possibly leading to underestimation of EF. Clinical correlation recommended. IMPRESSION chest x-ray 12/2020  IMPRESSION:     No acute pulmonic disease. Probable old granulomatous disease. Assessment         ICD-10-CM ICD-9-CM    1. Chest pain, unspecified type  R07.9 786.50 ECHO ADULT COMPLETE    Atypical chest pain. Normal LV perfusion we will continue to monitor clinically symptoms are improving   2. Abnormal stress test  R94.39 794.39 ECHO ADULT COMPLETE    Low ejection fraction follow-up with echo   3. [facility], unspecified [facility]  I42.9 425.4 ECHO ADULT COMPLETE    Possible hypertensive heart disease check echo   4. Essential hypertension  I10 401.9     Severe uncontrolled hypertension medications were increased. Allergic to lisinopril and Norvasc   5. Stage 3a chronic kidney disease  N18.31 585. 3     Stable monitor   6.  Hyperlipidemia, unspecified hyperlipidemia type  E78.5 272.4     Continue treatment lab with PCP     2/2021  Seen for low ejection fraction reported on nuclear scan. History of severe hypertension uncontrolled. Increase hydralazine and Coreg. Has side effect with lisinopril and amlodipine which are off. Check echo for LV function. Continue with other therapy  Medications Discontinued During This Encounter   Medication Reason    lisinopril (PRINIVIL, ZESTRIL) 10 mg tablet Not A Current Medication    hydrALAZINE (APRESOLINE) 25 mg tablet     carvediloL (COREG) 6.25 mg tablet        Orders Placed This Encounter    ECHO ADULT COMPLETE     Standing Status:   Future     Standing Expiration Date:   2/12/2022     Order Specific Question:   Contrast Enhancement (Bubble Study, Definity, Optison) may be used if criteria listed in established evidence-based protocol has been identified. Answer: Yes    carvediloL (COREG) 12.5 mg tablet     Sig: Take 1 Tab by mouth two (2) times daily (with meals). Dispense:  180 Tab     Refill:  3    hydrALAZINE (APRESOLINE) 50 mg tablet     Sig: Take 1 Tab by mouth three (3) times daily. Dispense:  270 Tab     Refill:  3       Follow-up and Dispositions    · Return for F/u after tests.

## 2021-02-23 RX ORDER — LEVOTHYROXINE SODIUM 150 UG/1
150 TABLET ORAL
Qty: 90 TAB | Refills: 3 | Status: SHIPPED | OUTPATIENT
Start: 2021-02-23 | End: 2022-04-28 | Stop reason: SDUPTHER

## 2021-02-23 NOTE — PROGRESS NOTES
Patient identified with 2 identifiers (name and ).   Patient aware of   Thyroid function is normal, cont current dose 125 mcgs/day   Kidney function is about the same, will continue to monitor   Patient states he is taking 150 mcgs since [id number] [facility]

## 2021-03-10 ENCOUNTER — TELEPHONE (OUTPATIENT)
Dept: FAMILY MEDICINE CLINIC | Age: 76
End: 2021-03-10

## 2021-03-10 NOTE — TELEPHONE ENCOUNTER
Have you been diagnosed with, tested for, or told that you are suspected of having COVID-19 (coronovirus)? No  Have you had a fever or taken medication to treat a fever in the past 72 hours? No  Have you had a cough, SOB, or flu-like symptoms within the past 3 days? N0  Have you had direct contact with someone who tested positive for COVID-19 within the past 14 days? No  Do you have a household member with flu-like symptoms, including fever, cough, or SOB? No  Do you currently have flu-like symptoms including fever, cough, or SOB? No  Are you experiencing new loss of taste or smell?  No

## 2021-03-11 ENCOUNTER — OFFICE VISIT (OUTPATIENT)
Dept: FAMILY MEDICINE CLINIC | Age: 76
End: 2021-03-11
Payer: MEDICARE

## 2021-03-11 VITALS
RESPIRATION RATE: 16 BRPM | OXYGEN SATURATION: 99 % | TEMPERATURE: 98.2 F | WEIGHT: 162 LBS | HEIGHT: 66 IN | DIASTOLIC BLOOD PRESSURE: 68 MMHG | BODY MASS INDEX: 26.03 KG/M2 | SYSTOLIC BLOOD PRESSURE: 166 MMHG | HEART RATE: 57 BPM

## 2021-03-11 DIAGNOSIS — N18.31 STAGE 3A CHRONIC KIDNEY DISEASE (HCC): ICD-10-CM

## 2021-03-11 DIAGNOSIS — I10 ESSENTIAL HYPERTENSION: Primary | ICD-10-CM

## 2021-03-11 DIAGNOSIS — E03.9 ACQUIRED HYPOTHYROIDISM: ICD-10-CM

## 2021-03-11 DIAGNOSIS — Z72.0 TOBACCO USE: ICD-10-CM

## 2021-03-11 DIAGNOSIS — N52.9 ERECTILE DYSFUNCTION, UNSPECIFIED ERECTILE DYSFUNCTION TYPE: ICD-10-CM

## 2021-03-11 DIAGNOSIS — E78.00 PURE HYPERCHOLESTEROLEMIA: ICD-10-CM

## 2021-03-11 PROCEDURE — G8753 SYS BP > OR = 140: HCPCS | Performed by: FAMILY MEDICINE

## 2021-03-11 PROCEDURE — 3017F COLORECTAL CA SCREEN DOC REV: CPT | Performed by: FAMILY MEDICINE

## 2021-03-11 PROCEDURE — 99214 OFFICE O/P EST MOD 30 MIN: CPT | Performed by: FAMILY MEDICINE

## 2021-03-11 PROCEDURE — G8510 SCR DEP NEG, NO PLAN REQD: HCPCS | Performed by: FAMILY MEDICINE

## 2021-03-11 PROCEDURE — 1101F PT FALLS ASSESS-DOCD LE1/YR: CPT | Performed by: FAMILY MEDICINE

## 2021-03-11 PROCEDURE — G8754 DIAS BP LESS 90: HCPCS | Performed by: FAMILY MEDICINE

## 2021-03-11 PROCEDURE — G8536 NO DOC ELDER MAL SCRN: HCPCS | Performed by: FAMILY MEDICINE

## 2021-03-11 PROCEDURE — G8427 DOCREV CUR MEDS BY ELIG CLIN: HCPCS | Performed by: FAMILY MEDICINE

## 2021-03-11 PROCEDURE — G8419 CALC BMI OUT NRM PARAM NOF/U: HCPCS | Performed by: FAMILY MEDICINE

## 2021-03-11 PROCEDURE — G0463 HOSPITAL OUTPT CLINIC VISIT: HCPCS | Performed by: FAMILY MEDICINE

## 2021-03-11 RX ORDER — CARVEDILOL 25 MG/1
25 TABLET ORAL 2 TIMES DAILY WITH MEALS
Qty: 180 TAB | Refills: 0 | Status: SHIPPED | OUTPATIENT
Start: 2021-03-11 | End: 2021-03-11 | Stop reason: SDUPTHER

## 2021-03-11 RX ORDER — SILDENAFIL 50 MG/1
50 TABLET, FILM COATED ORAL AS NEEDED
Qty: 12 TAB | Refills: 5 | Status: SHIPPED | OUTPATIENT
Start: 2021-03-11 | End: 2022-03-14 | Stop reason: SDUPTHER

## 2021-03-11 NOTE — PROGRESS NOTES
Floridalma Bui, 76 y.o.,  male    SUBJECTIVE  Ff-up      HTN-says hydralazine dose to 50 was recently increased with marked BP readings during cardiology visit. Reviewed notes/echo normal, atypical chest pain. He continues to take coreg 12.5 mg bid  He has stopped norvasc due to skin allergic rash reaction, though this is not completely clear correlation. Pt with h/o CKD. HL-on mevacor    He is a chronic smoker, about 5 cig/ day. aaa screen neg. Old granulomatous disease on repeat CXR, however did not pursue CT chest- advised to get done to r/o malignancy (provided central scheduling # provided)    Hypothyroidism-reports  Compliant with 150 mcgs synthroid/day. Reviewed normal TSH. Remote h/o throat ca s/p radiation/chemotherapy 2003    ED- request meds for this. ROS:  See HPI, all others negative        Patient Active Problem List   Diagnosis Code    Essential hypertension I10    Acquired hypothyroidism E03.9    Tobacco use Z72.0    Stage 3a chronic kidney disease N18.31    Abnormal stress test R94.39    Cardiomyopathy ([facility] I42.9       Current Outpatient Medications   Medication Sig Dispense Refill    sildenafil citrate (Viagra) 50 mg tablet Take 1 Tab by mouth as needed for Erectile Dysfunction. 12 Tab 5    levothyroxine (SYNTHROID) 150 mcg tablet Take 1 Tab by mouth Daily (before breakfast). 90 Tab 3    hydrALAZINE (APRESOLINE) 50 mg tablet Take 1 Tab by mouth three (3) times daily. 270 Tab 3    lovastatin (MEVACOR) 20 mg tablet Take 2 Tabs by mouth nightly.  180 Tab 3       Allergies   Allergen Reactions    Lisinopril Rash    Norvasc [Amlodipine] Itching       Past Medical History:   Diagnosis Date    Cancer ([facility] 2012    throat    Gout     Hx of colonoscopy with polypectomy 11/15/2016    rpt 5 years 2021, dr. Vance Lang    Hypertension     Pancreatitis 1989    Thyroid disease        Social History     Socioeconomic History    Marital status:      Spouse name: Not on file    Number of children: Not on file    Years of education: Not on file    Highest education level: Not on file   Occupational History    Not on file   Social Needs    Financial resource strain: Not on file    Food insecurity     Worry: Not on file     Inability: Not on file    Transportation needs     Medical: Not on file     Non-medical: Not on file   Tobacco Use    Smoking status: Current Every Day Smoker     Packs/day: 0.25     Years: 50.00     Pack years: 12.50    Smokeless tobacco: Never Used   Substance and Sexual Activity    Alcohol use: Yes     Comment: 3    Drug use: No    Sexual activity: Yes     Partners: Female   Lifestyle    Physical activity     Days per week: Not on file     Minutes per session: Not on file    Stress: Not on file   Relationships    Social connections     Talks on phone: Not on file     Gets together: Not on file     Attends Gnosticism service: Not on file     Active member of club or organization: Not on file     Attends meetings of clubs or organizations: Not on file     Relationship status: Not on file    Intimate partner violence     Fear of current or ex partner: Not on file     Emotionally abused: Not on file     Physically abused: Not on file     Forced sexual activity: Not on file   Other Topics Concern    Not on file   Social History Narrative    Not on file       Family History   Problem Relation Age of Onset    Cancer Father          OBJECTIVE    Physical Exam:     Visit Vitals  BP (!) 166/68 (BP 1 Location: Left upper arm, BP Patient Position: Sitting, BP Cuff Size: Adult)   Pulse (!) 57   Temp 98.2 °F (36.8 °C) (Temporal)   Resp 16   Ht 5' 6\" (1.676 m)   Wt 162 lb (73.5 kg)   SpO2 99%   BMI 26.15 kg/m²       General: alert, elderly, AA, in no apparent distress or pain  Head: atraumatic.  Non-tender maxillary and frontal sinuses  Neck: supple, no adenopathy palpated  CVS: normal rate, regular rhythm, distinct S1 and S2  Lungs:clear to ausculation bilaterally, no crackles, wheezing or rhonchi noted  Abdomen: normoactive bowel sounds, soft, non-tender  Extremities: no edema, no cyanosis, MSK grossly normal  Skin: warm, no lesions, rashes noted  Psych:  mood and affect normal        ASSESSMENT/PLAN  Diagnoses and all orders for this visit:    1. Essential hypertension  Needs better control  Increase coreg to 25 mg bid  Cont hydralazine 50 mg tid  Appears to have allergy to norvasc, agree to hold  Cont  mevacor  DASH Diet    2. Acquired hypothyroidism  Stable  Cont 150 mcgs synthroid/day  TSH 2/2021 normal    3. Tobacco use  Discussed complete cessation    4. Stage 3a chronic kidney disease  Monitoring    5. Chest pain, unspecified type  Atypical  Stress test/echo 2/2021 normal    6. Abnormal CXR  Old granuloma, advised to pursue Chest Ct with smoking h/o    7. Pure hypercholesterolemia  Cont mevacor    8. Erectile dysfunction, unspecified  Start prn viagra    Follow-up and Dispositions    · Return in about 4 weeks (around 4/8/2021), or if symptoms worsen or fail to improve, for BP recheck, routine chronic illness care. Patient understands plan of care. Patient has provided input and agrees with goals.

## 2021-03-11 NOTE — PROGRESS NOTES
1. Have you been to the ER, urgent care clinic since your last visit? Hospitalized since your last visit? No    2. Have you seen or consulted any other health care providers outside of the [address] [place] [address] since your last visit? Include any pap smears or colon screening.  No      Chief Complaint   Patient presents with    Hypertension    Chronic Kidney Disease    Cholesterol Problem    Thyroid Problem

## 2021-03-11 NOTE — PATIENT INSTRUCTIONS
DASH Diet: Care Instructions Your Care Instructions The DASH diet is an eating plan that can help lower your blood pressure. DASH stands for Dietary Approaches to Stop Hypertension. Hypertension is high blood pressure. The DASH diet focuses on eating foods that are high in calcium, potassium, and magnesium. These nutrients can lower blood pressure. The foods that are highest in these nutrients are fruits, vegetables, low-fat dairy products, nuts, seeds, and legumes. But taking calcium, potassium, and magnesium supplements instead of eating foods that are high in those nutrients does not have the same effect. The DASH diet also includes whole grains, fish, and poultry. The DASH diet is one of several lifestyle changes your doctor may recommend to lower your high blood pressure. Your doctor may also want you to decrease the amount of sodium in your diet. Lowering sodium while following the DASH diet can lower blood pressure even further than just the DASH diet alone. Follow-up care is a key part of your treatment and safety. Be sure to make and go to all appointments, and call your doctor if you are having problems. It's also a good idea to know your test results and keep a list of the medicines you take. How can you care for yourself at home? Following the DASH diet · Eat 4 to 5 servings of fruit each day. A serving is 1 medium-sized piece of fruit, ½ cup chopped or canned fruit, 1/4 cup dried fruit, or 4 ounces (½ cup) of fruit juice. Choose fruit more often than fruit juice. · Eat 4 to 5 servings of vegetables each day. A serving is 1 cup of lettuce or raw leafy vegetables, ½ cup of chopped or cooked vegetables, or 4 ounces (½ cup) of vegetable juice. Choose vegetables more often than vegetable juice. · Get 2 to 3 servings of low-fat and fat-free dairy each day. A serving is 8 ounces of milk, 1 cup of yogurt, or 1 ½ ounces of cheese. · Eat 6 to 8 servings of grains each day.  A serving is 1 slice of bread, 1 ounce of dry cereal, or ½ cup of cooked rice, pasta, or cooked cereal. Try to choose whole-grain products as much as possible. · Limit lean meat, poultry, and fish to 2 servings each day. A serving is 3 ounces, about the size of a deck of cards. · Eat 4 to 5 servings of nuts, seeds, and legumes (cooked dried beans, lentils, and split peas) each week. A serving is 1/3 cup of nuts, 2 tablespoons of seeds, or ½ cup of cooked beans or peas. · Limit fats and oils to 2 to 3 servings each day. A serving is 1 teaspoon of vegetable oil or 2 tablespoons of salad dressing. · Limit sweets and added sugars to 5 servings or less a week. A serving is 1 tablespoon jelly or jam, ½ cup sorbet, or 1 cup of lemonade. · Eat less than 2,300 milligrams (mg) of sodium a day. If you limit your sodium to 1,500 mg a day, you can lower your blood pressure even more. Tips for success · Start small. Do not try to make dramatic changes to your diet all at once. You might feel that you are missing out on your favorite foods and then be more likely to not follow the plan. Make small changes, and stick with them. Once those changes become habit, add a few more changes. · Try some of the following: ? Make it a goal to eat a fruit or vegetable at every meal and at snacks. This will make it easy to get the recommended amount of fruits and vegetables each day. ? Try yogurt topped with fruit and nuts for a snack or healthy dessert. ? Add lettuce, tomato, cucumber, and onion to sandwiches. ? Combine a ready-made pizza crust with low-fat mozzarella cheese and lots of vegetable toppings. Try using tomatoes, squash, spinach, broccoli, carrots, cauliflower, and onions. ? Have a variety of cut-up vegetables with a low-fat dip as an appetizer instead of chips and dip. ? Sprinkle sunflower seeds or chopped almonds over salads. Or try adding chopped walnuts or almonds to cooked vegetables.  
? Try some vegetarian meals using beans and peas. Add garbanzo or kidney beans to salads. Make burritos and tacos with mashed rodriguez beans or black beans. Where can you learn more? Go to http://www.gray.com/ Enter C778 in the search box to learn more about \"DASH Diet: Care Instructions. \" Current as of: December 16, 2019               Content Version: 12.6 © 6539-4129 [facility]. Care instructions adapted under license by [employer] (which disclaims liability or warranty for this information). If you have questions about a medical condition or this instruction, always ask your healthcare professional. [address] any warranty or liability for your use of this information.

## 2021-03-16 ENCOUNTER — OFFICE VISIT (OUTPATIENT)
Dept: CARDIOLOGY CLINIC | Age: 76
End: 2021-03-16
Payer: MEDICARE

## 2021-03-16 VITALS
HEART RATE: 55 BPM | BODY MASS INDEX: 26.2 KG/M2 | HEIGHT: 66 IN | DIASTOLIC BLOOD PRESSURE: 89 MMHG | TEMPERATURE: 98.2 F | SYSTOLIC BLOOD PRESSURE: 189 MMHG | WEIGHT: 163 LBS

## 2021-03-16 DIAGNOSIS — R94.39 ABNORMAL STRESS TEST: ICD-10-CM

## 2021-03-16 DIAGNOSIS — I10 ESSENTIAL HYPERTENSION: ICD-10-CM

## 2021-03-16 DIAGNOSIS — E78.5 HYPERLIPIDEMIA, UNSPECIFIED HYPERLIPIDEMIA TYPE: ICD-10-CM

## 2021-03-16 DIAGNOSIS — R07.9 CHEST PAIN, UNSPECIFIED TYPE: Primary | ICD-10-CM

## 2021-03-16 DIAGNOSIS — I42.9 CARDIOMYOPATHY, UNSPECIFIED TYPE (HCC): ICD-10-CM

## 2021-03-16 DIAGNOSIS — N18.31 STAGE 3A CHRONIC KIDNEY DISEASE (HCC): ICD-10-CM

## 2021-03-16 PROCEDURE — G8427 DOCREV CUR MEDS BY ELIG CLIN: HCPCS | Performed by: INTERNAL MEDICINE

## 2021-03-16 PROCEDURE — 99214 OFFICE O/P EST MOD 30 MIN: CPT | Performed by: INTERNAL MEDICINE

## 2021-03-16 PROCEDURE — G8432 DEP SCR NOT DOC, RNG: HCPCS | Performed by: INTERNAL MEDICINE

## 2021-03-16 PROCEDURE — G8753 SYS BP > OR = 140: HCPCS | Performed by: INTERNAL MEDICINE

## 2021-03-16 PROCEDURE — G8536 NO DOC ELDER MAL SCRN: HCPCS | Performed by: INTERNAL MEDICINE

## 2021-03-16 PROCEDURE — G8754 DIAS BP LESS 90: HCPCS | Performed by: INTERNAL MEDICINE

## 2021-03-16 PROCEDURE — G8419 CALC BMI OUT NRM PARAM NOF/U: HCPCS | Performed by: INTERNAL MEDICINE

## 2021-03-16 PROCEDURE — 3017F COLORECTAL CA SCREEN DOC REV: CPT | Performed by: INTERNAL MEDICINE

## 2021-03-16 PROCEDURE — 1101F PT FALLS ASSESS-DOCD LE1/YR: CPT | Performed by: INTERNAL MEDICINE

## 2021-03-16 RX ORDER — VALSARTAN 80 MG/1
80 TABLET ORAL DAILY
Qty: 30 TAB | Refills: 2 | Status: SHIPPED | OUTPATIENT
Start: 2021-03-16 | End: 2021-06-14 | Stop reason: SDUPTHER

## 2021-03-16 RX ORDER — CARVEDILOL 25 MG/1
25 TABLET ORAL 2 TIMES DAILY WITH MEALS
COMMUNITY
End: 2021-04-12 | Stop reason: SDUPTHER

## 2021-03-16 NOTE — PROGRESS NOTES
1. Have you been to the ER, urgent care clinic since your last visit? Hospitalized since your last visit?     no  2. Have you seen or consulted any other health care providers outside of the [address] [place] [address] since your last visit? Include any pap smears or colon screening.       No

## 2021-03-16 NOTE — PROGRESS NOTES
HISTORY OF PRESENT ILLNESS  Lilly Alvarez is a 76 y.o. male. 2/12/2021  Patient seen today for new patient evaluation. He is referred here for evaluation of chest pain. Patient was in emergency room May 2020 with episodes of chest pain. Patient sharp pain in the chest at that time. Since that time he has not had any recurrence of chest pain. He denies any shortness of breath, dizziness palpitation or other symptom. Patient subsequent stress test in 12/2020 that was reported abnormal due to low ejection fraction. It was thought to be under calculation possibly from LVH. Perfusion was reported normal.  3/16/2021  Patient presents to f/u for HTN and echo results. He reports Coreg recently increased to 25 mg /BID by PCP, and b/p remains elevated. Review of Systems   Constitutional: Negative for chills and fever. HENT: Negative for nosebleeds. Eyes: Negative for blurred vision and double vision. Respiratory: Negative for cough, hemoptysis, sputum production, shortness of breath and wheezing. Cardiovascular: Negative for chest pain, palpitations, orthopnea, claudication, leg swelling and PND. Gastrointestinal: Negative for abdominal pain, heartburn, nausea and vomiting. Musculoskeletal: Negative for myalgias. Skin: Negative for rash. Neurological: Negative for dizziness, weakness and headaches. Endo/Heme/Allergies: Does not bruise/bleed easily.      Family History   Problem Relation Age of Onset    Cancer Father        Past Medical History:   Diagnosis Date    Cancer [facility]) 2012    throat    Gout     Hx of colonoscopy with polypectomy 11/15/2016    rpt 5 years 2021, dr. Salas Reason    Hypertension     Pancreatitis 1989    Thyroid disease        Past Surgical History:   Procedure Laterality Date    HX COLONOSCOPY  11/15/2016    repeat 5 years 2021, Dr Soraya Waldrop         Social History     Tobacco Use    Smoking status: Current Every Day Smoker     Packs/day: 0.25     Years: 50.00     Pack years: 12.50    Smokeless tobacco: Never Used   Substance Use Topics    Alcohol use: Yes     Comment: 3       Allergies   Allergen Reactions    Lisinopril Rash    Norvasc [Amlodipine] Itching       Prior to Admission medications    Medication Sig Start Date End Date Taking? Authorizing Provider   carvediloL (Coreg) 25 mg tablet Take 25 mg by mouth two (2) times daily (with meals). Yes Provider, Historical   valsartan (DIOVAN) 80 mg tablet Take 1 Tab by mouth daily. 3/16/21  Yes Jacque Mari NP   sildenafil citrate (Viagra) 50 mg tablet Take 1 Tab by mouth as needed for Erectile Dysfunction. 3/11/21  Yes Celia Hogue MD   levothyroxine (SYNTHROID) 150 mcg tablet Take 1 Tab by mouth Daily (before breakfast). 2/23/21  Yes Daria Canada MD   hydrALAZINE (APRESOLINE) 50 mg tablet Take 1 Tab by mouth three (3) times daily. 2/12/21  Yes Luis Alberto Hedrick MD   lovastatin (MEVACOR) 20 mg tablet Take 2 Tabs by mouth nightly. 2/11/21  Yes Celia Hogue MD         Visit Vitals  BP (!) 189/89   Pulse (!) 55   Temp 98.2 °F (36.8 °C) (Temporal)   Ht 5' 6\" (1.676 m)   Wt 73.9 kg (163 lb)   BMI 26.31 kg/m²         Physical Exam   Constitutional: He is oriented to person, place, and time. He appears well-developed and well-nourished. HENT:   Head: Normocephalic and atraumatic. Eyes: Conjunctivae are normal.   Neck: Neck supple. No JVD present. No tracheal deviation present. No thyromegaly present. Cardiovascular: Normal rate and regular rhythm. Exam reveals no gallop and no friction rub. Murmur heard. Pulmonary/Chest: Breath sounds normal. No respiratory distress. He has no wheezes. He has no rales. He exhibits no tenderness. Abdominal: Soft. There is no abdominal tenderness. Musculoskeletal:         General: No edema. Neurological: He is alert and oriented to person, place, and time. Skin: Skin is warm and dry.    Psychiatric: He has a normal mood and affect. Nursing note and vitals reviewed. Mr. Aura Sandifer has a reminder for a \"due or due soon\" health maintenance. I have asked that he contact his primary care provider for follow-up on this health maintenance. No flowsheet data found. I have personally reviewed patient's records available from hospital and other providers and incorporated findings in patient care. Notes, lab, EKG, stress test, chest x-ray  Procedure Conclusion 12/2020    Nuclear Stress Test    Nuclear Cardiac Spect Rest then Gated Stress study. Blase Gell was used as the stressing method and agent. (Blase Gell given via a 10 - 20 sec injection.)   One day myocardial perfusion study. Date: 12/9/2020. Left ventricular perfusion is normal. Myocardial perfusion imaging supports an intermediate risk stress test.   There is no prior study available for comparison. .   Interpretation Summary 12/2020    · Baseline ECG: Sinus bradycardia. LVH with repolorization; possible left atrial enlargement  · Gated SPECT: Left ventricular function post-stress was abnormal. Calculated ejection fraction is 47%. There is no evidence of transient ischemic dilation (TID). The TID ratio is 1.17. · Left ventricular perfusion is normal, no ischemia or previous infarct. · Myocardial perfusion imaging supports an intermediate risk stress test due to calculated EF of 47%. Significant LVH present, possibly leading to underestimation of EF. Clinical correlation recommended. Echo 3/4/2021  Interpretation Summary  · LV: Estimated LVEF is 60 - 65%. Normal cavity size and systolic function (ejection fraction normal). Severe apical hypertrophy. Moderate septal wall hypertrophy. Moderate posterior wall hypertrophy. Wall motion: normal. Moderate (grade 2) left ventricular diastolic dysfunction. · LA: Left Atrium volume index is 30.05 mL/m2. · RV: Normal right ventricular size and function. · MV: Mild mitral valve regurgitation is present.        IMPRESSION chest x-ray 12/2020    No acute pulmonic disease. Probable old granulomatous disease. Assessment         ICD-10-CM ICD-9-CM    1. Chest pain, unspecified type  R07.9 786.50     Atypical chest pain. Normal LV perfusion we will continue to monitor clinically symptoms are improving   2. Cardiomyopathy, unspecified type ([facility]  I42.9 425.4     Possible hypertensive heart disease - EF 60-65% by echo   3. Essential hypertension  I10 401.9 valsartan (DIOVAN) 80 mg tablet      METABOLIC PANEL, BASIC    Severe uncontrolled hypertension- add valsartan 80 mg / day - BMP 1 week. Allergic to lisinopril and Norvasc  Severe apical hypertrophy by echo   4. Hyperlipidemia, unspecified hyperlipidemia type  E78.5 272.4     Continue treatment lab with PCP   5. Abnormal stress test  R94.39 794.39     Low ejection fraction  -   EF by echo 60-65%   6. Stage 3a chronic kidney disease  N18.31 585. 3     Stable monitor     2/2021  Seen for low ejection fraction reported on nuclear scan. History of severe hypertension uncontrolled. Increase hydralazine and Coreg. Has side effect with lisinopril and amlodipine which are off. Check echo for LV function. Continue with other therapy  3/2021  Echo revealed EF 60-65% - with severe apical hypertrophy likely due to hypertensive heart disease. Blood pressure is uncontrolled. Will begin Valsartan 80 mg/ Day   Check BMP in 1 week and follow home chart if able. Encouraged smoking cessation     There are no discontinued medications. Orders Placed This Encounter    METABOLIC PANEL, BASIC     Standing Status:   Future     Standing Expiration Date:   3/17/2022    valsartan (DIOVAN) 80 mg tablet     Sig: Take 1 Tab by mouth daily. Dispense:  30 Tab     Refill:  2       Follow-up and Dispositions    · Return in about 3 months (around 6/16/2021). I have personally performed a face-to-face diagnostic evaluation on this patient. My findings are as follows.   History and physical exam by me shows: Blood pressure remains elevated. Shortness of breath stable  I have personally reviewed all the diagnostic labs, available EKG imaging x-rays and other diagnostic data and incorporated them into my care I discussed. .  My clinical impression for patient's status added valsartan. Continue other treatment. Continue monitoring for hypertrophy which clinically appears to be related to hypertensive heart disease  Detailed discussion of patient's care plan was discussed. All appropriate treatment options discussed and incorporated. All medications as well as diagnostic testing evaluation that are required for the patient I reviewed and orders are placed under my supervision. Care plan discussed and updated after review.   Elie El MD

## 2021-03-16 NOTE — PATIENT INSTRUCTIONS
After the recommended changes have been made in blood pressure medicines, patient advised to keep BP/HR(pulse rate) chart twice daily and bring us results in next 4 to 5 days. Patient may send the results via \"My Chart\" if desired. Please rest for 5-10 minutes before checking blood pressure. Sit on a comfortable chair without crossing the legs and put your arm on a table. We recommend that you use an upper arm cuff. Check the blood pressure 3 times each time you check the blood pressure and record the lowest reading. If you check the blood pressure in both arms, use the higher reading. Have blood work drawn in 1 week Heart-Healthy Diet: Care Instructions Your Care Instructions A heart-healthy diet has lots of vegetables, fruits, nuts, beans, and whole grains, and is low in salt. It limits foods that are high in saturated fat, such as meats, cheeses, and fried foods. It may be hard to change your diet, but even small changes can lower your risk of heart attack and heart disease. Follow-up care is a key part of your treatment and safety. Be sure to make and go to all appointments, and call your doctor if you are having problems. It's also a good idea to know your test results and keep a list of the medicines you take. How can you care for yourself at home? Watch your portions · Learn what a serving is. A \"serving\" and a \"portion\" are not always the same thing. Make sure that you are not eating larger portions than are recommended. For example, a serving of pasta is ½ cup. A serving size of meat is 2 to 3 ounces. A 3-ounce serving is about the size of a deck of cards. Measure serving sizes until you are good at [place]" them. Keep in mind that restaurants often serve portions that are 2 or 3 times the size of one serving. · To keep your energy level up and keep you from feeling hungry, eat often but in smaller portions.  
· Eat only the number of calories you need to stay at a healthy weight. If you need to lose weight, eat fewer calories than your body burns (through exercise and other physical activity). Eat more fruits and vegetables · Eat a variety of fruit and vegetables every day. Dark green, deep orange, red, or yellow fruits and vegetables are especially good for you. Examples include spinach, carrots, peaches, and berries. · Keep carrots, celery, and other veggies handy for snacks. Buy fruit that is in season and store it where you can see it so that you will be tempted to eat it. · Cook dishes that have a lot of veggies in them, such as stir-fries and soups. Limit saturated and trans fat · Read food labels, and try to avoid saturated and trans fats. They increase your risk of heart disease. · Use olive or canola oil when you cook. · Bake, broil, grill, or steam foods instead of frying them. · Choose lean meats instead of high-fat meats such as hot dogs and sausages. Cut off all visible fat when you prepare meat. · Eat fish, skinless poultry, and meat alternatives such as soy products instead of high-fat meats. Soy products, such as tofu, may be especially good for your heart. · Choose low-fat or fat-free milk and dairy products. Eat foods high in fiber · Eat a variety of grain products every day. Include whole-grain foods that have lots of fiber and nutrients. Examples of whole-grain foods include oats, whole wheat bread, and brown rice. · Buy whole-grain breads and cereals, instead of white bread or pastries. Limit salt and sodium · Limit how much salt and sodium you eat to help lower your blood pressure. · Taste food before you salt it. Add only a little salt when you think you need it. With time, your taste buds will adjust to less salt. · Eat fewer snack items, fast foods, and other high-salt, processed foods. Check food labels for the amount of sodium in packaged foods. · Choose low-sodium versions of canned goods (such as soups, vegetables, and beans).  
Limit sugar · Limit drinks and foods with added sugar. These include candy, desserts, and soda pop. Limit alcohol · Limit alcohol to no more than 2 drinks a day for men and 1 drink a day for women. Too much alcohol can cause health problems. When should you call for help? Watch closely for changes in your health, and be sure to contact your doctor if: 
  · You would like help planning heart-healthy meals. Where can you learn more? Go to http://www.clifford.com/ Enter V137 in the search box to learn more about \"Heart-Healthy Diet: Care Instructions. \" Current as of: August 22, 2019               Content Version: 12.6 © 3813-6469 [employer], [employer]. Care instructions adapted under license by [employer] (which disclaims liability or warranty for this information). If you have questions about a medical condition or this instruction, always ask your healthcare professional. [address] any warranty or liability for your use of this information.

## 2021-04-12 ENCOUNTER — OFFICE VISIT (OUTPATIENT)
Dept: FAMILY MEDICINE CLINIC | Age: 76
End: 2021-04-12
Payer: MEDICARE

## 2021-04-12 VITALS
BODY MASS INDEX: 25.65 KG/M2 | SYSTOLIC BLOOD PRESSURE: 190 MMHG | WEIGHT: 159.6 LBS | HEIGHT: 66 IN | RESPIRATION RATE: 16 BRPM | TEMPERATURE: 97.4 F | HEART RATE: 72 BPM | OXYGEN SATURATION: 100 % | DIASTOLIC BLOOD PRESSURE: 96 MMHG

## 2021-04-12 DIAGNOSIS — E78.00 PURE HYPERCHOLESTEROLEMIA: ICD-10-CM

## 2021-04-12 DIAGNOSIS — I10 ESSENTIAL HYPERTENSION: Primary | ICD-10-CM

## 2021-04-12 DIAGNOSIS — N52.9 ERECTILE DYSFUNCTION, UNSPECIFIED ERECTILE DYSFUNCTION TYPE: ICD-10-CM

## 2021-04-12 DIAGNOSIS — R07.89 ATYPICAL CHEST PAIN: ICD-10-CM

## 2021-04-12 DIAGNOSIS — N18.31 STAGE 3A CHRONIC KIDNEY DISEASE (HCC): ICD-10-CM

## 2021-04-12 DIAGNOSIS — R93.89 ABNORMAL CXR: ICD-10-CM

## 2021-04-12 DIAGNOSIS — E03.9 ACQUIRED HYPOTHYROIDISM: ICD-10-CM

## 2021-04-12 PROCEDURE — 1101F PT FALLS ASSESS-DOCD LE1/YR: CPT | Performed by: FAMILY MEDICINE

## 2021-04-12 PROCEDURE — 3017F COLORECTAL CA SCREEN DOC REV: CPT | Performed by: FAMILY MEDICINE

## 2021-04-12 PROCEDURE — G8427 DOCREV CUR MEDS BY ELIG CLIN: HCPCS | Performed by: FAMILY MEDICINE

## 2021-04-12 PROCEDURE — G8536 NO DOC ELDER MAL SCRN: HCPCS | Performed by: FAMILY MEDICINE

## 2021-04-12 PROCEDURE — G8419 CALC BMI OUT NRM PARAM NOF/U: HCPCS | Performed by: FAMILY MEDICINE

## 2021-04-12 PROCEDURE — G0463 HOSPITAL OUTPT CLINIC VISIT: HCPCS | Performed by: FAMILY MEDICINE

## 2021-04-12 PROCEDURE — G8753 SYS BP > OR = 140: HCPCS | Performed by: FAMILY MEDICINE

## 2021-04-12 PROCEDURE — G8510 SCR DEP NEG, NO PLAN REQD: HCPCS | Performed by: FAMILY MEDICINE

## 2021-04-12 PROCEDURE — G8755 DIAS BP > OR = 90: HCPCS | Performed by: FAMILY MEDICINE

## 2021-04-12 PROCEDURE — 99214 OFFICE O/P EST MOD 30 MIN: CPT | Performed by: FAMILY MEDICINE

## 2021-04-12 RX ORDER — FELODIPINE 5 MG/1
5 TABLET, EXTENDED RELEASE ORAL DAILY
Qty: 90 TAB | Refills: 0 | Status: SHIPPED | OUTPATIENT
Start: 2021-04-12 | End: 2021-07-07 | Stop reason: SDUPTHER

## 2021-04-12 RX ORDER — CARVEDILOL 25 MG/1
25 TABLET ORAL 2 TIMES DAILY WITH MEALS
Qty: 180 TAB | Refills: 0 | Status: SHIPPED | OUTPATIENT
Start: 2021-04-12 | End: 2021-07-20 | Stop reason: SDUPTHER

## 2021-04-12 NOTE — PROGRESS NOTES
Catalina Silva, 76 y.o.,  male    SUBJECTIVE  Ff-up      HTN-recently added diovan 80 mg by cardiology for severe HTN and LVH. Also currently on  hydralazine dose to 50 mg and coreg 25 mg bid. He has stopped norvasc due to skin allergic rash reaction, though this is not completely clear correlation. Pt with h/o CKD. HL-on mevacor    He is a chronic smoker, about 5 cig/ day. aaa screen neg. Old granulomatous disease on repeat CXR, however did not pursue CT chest- advised to get done to r/o malignancy (provided central scheduling # provided)    Hypothyroidism-reports  Compliant with 150 mcgs synthroid/day. Reviewed normal TSH 2/2021    Remote h/o throat ca s/p radiation/chemotherapy 2003    ED- on prn viagra  ROS:  See HPI, all others negative        Patient Active Problem List   Diagnosis Code    Essential hypertension I10    Acquired hypothyroidism E03.9    Tobacco use Z72.0    Stage 3a chronic kidney disease ([facility] N18.31    Abnormal stress test R94.39    [facility] I42.9       Current Outpatient Medications   Medication Sig Dispense Refill    felodipine (PLENDIL SR) 5 mg 24 hr tablet Take 1 Tab by mouth daily. 90 Tab 0    carvediloL (Coreg) 25 mg tablet Take 1 Tab by mouth two (2) times daily (with meals). 180 Tab 0    valsartan (DIOVAN) 80 mg tablet Take 1 Tab by mouth daily. 30 Tab 2    sildenafil citrate (Viagra) 50 mg tablet Take 1 Tab by mouth as needed for Erectile Dysfunction. 12 Tab 5    levothyroxine (SYNTHROID) 150 mcg tablet Take 1 Tab by mouth Daily (before breakfast). 90 Tab 3    hydrALAZINE (APRESOLINE) 50 mg tablet Take 1 Tab by mouth three (3) times daily. 270 Tab 3    lovastatin (MEVACOR) 20 mg tablet Take 2 Tabs by mouth nightly.  180 Tab 3       Allergies   Allergen Reactions    Lisinopril Rash    Norvasc [Amlodipine] Itching       Past Medical History:   Diagnosis Date    Cancer ([facility] 2012    throat    Gout     Hx of colonoscopy with polypectomy 11/15/2016 rpt 5 years 2021, dr. Javid Barnes    Hypertension     Pancreatitis 1989    Thyroid disease        Social History     Socioeconomic History    Marital status:      Spouse name: Not on file    Number of children: Not on file    Years of education: Not on file    Highest education level: Not on file   Occupational History    Not on file   Social Needs    Financial resource strain: Not on file    Food insecurity     Worry: Not on file     Inability: Not on file    Transportation needs     Medical: Not on file     Non-medical: Not on file   Tobacco Use    Smoking status: Current Every Day Smoker     Packs/day: 0.25     Years: 50.00     Pack years: 12.50    Smokeless tobacco: Never Used   Substance and Sexual Activity    Alcohol use: Yes     Comment: 3    Drug use: No    Sexual activity: Yes     Partners: Female   Lifestyle    Physical activity     Days per week: Not on file     Minutes per session: Not on file    Stress: Not on file   Relationships    Social connections     Talks on phone: Not on file     Gets together: Not on file     Attends Yarsani service: Not on file     Active member of club or organization: Not on file     Attends meetings of clubs or organizations: Not on file     Relationship status: Not on file    Intimate partner violence     Fear of current or ex partner: Not on file     Emotionally abused: Not on file     Physically abused: Not on file     Forced sexual activity: Not on file   Other Topics Concern    Not on file   Social History Narrative    Not on file       Family History   Problem Relation Age of Onset    Cancer Father          OBJECTIVE    Physical Exam:     Visit Vitals  BP (!) 190/96 (BP 1 Location: Left upper arm, BP Patient Position: Sitting, BP Cuff Size: Adult)   Pulse 72   Temp 97.4 °F (36.3 °C) (Temporal)   Resp 16   Ht 5' 6\" (1.676 m)   Wt 159 lb 9.6 oz (72.4 kg)   SpO2 100%   BMI 25.76 kg/m²       General: alert, elderly, AA, in no apparent distress or pain  Head: atraumatic. Non-tender maxillary and frontal sinuses  Neck: supple, no adenopathy palpated  CVS: normal rate, regular rhythm, distinct S1 and S2  Lungs:clear to ausculation bilaterally, no crackles, wheezing or rhonchi noted  Abdomen: normoactive bowel sounds, soft, non-tender  Extremities: no edema, no cyanosis, MSK grossly normal  Skin: warm, no lesions, rashes noted  Psych:  mood and affect normal        ASSESSMENT/PLAN  Diagnoses and all orders for this visit:    1. Essential hypertension  Needs better control  Add felodipine 5 mg  Cont coreg 25 mg bid  Cont hydralazine 50 mg tid  Cont diovan- reminded on repeat BMP (ordered by cards) to follow renal function  ? allergy to norvasc/lisinopril  Cont  mevacor  DASH Diet    2. Acquired hypothyroidism  Stable  Cont 150 mcgs synthroid/day  TSH 2/2021 normal    3. Tobacco use  Discussed complete cessation    4. Stage 3a chronic kidney disease  Monitoring    5. Chest pain, unspecified type  Atypical  Stress test/echo 2/2021 normal    6. Abnormal CXR  Old granuloma, advised to pursue Chest Ct with smoking h/o    7. Pure hypercholesterolemia  Cont mevacor    8. Erectile dysfunction, unspecified  Cont prn viagra    Follow-up and Dispositions    · Return in about 4 weeks (around 5/10/2021), or if symptoms worsen or fail to improve, for routine chronic illness care, BP recheck. Patient understands plan of care. Patient has provided input and agrees with goals.

## 2021-04-12 NOTE — PATIENT INSTRUCTIONS
DASH Diet: Care Instructions Your Care Instructions The DASH diet is an eating plan that can help lower your blood pressure. DASH stands for Dietary Approaches to Stop Hypertension. Hypertension is high blood pressure. The DASH diet focuses on eating foods that are high in calcium, potassium, and magnesium. These nutrients can lower blood pressure. The foods that are highest in these nutrients are fruits, vegetables, low-fat dairy products, nuts, seeds, and legumes. But taking calcium, potassium, and magnesium supplements instead of eating foods that are high in those nutrients does not have the same effect. The DASH diet also includes whole grains, fish, and poultry. The DASH diet is one of several lifestyle changes your doctor may recommend to lower your high blood pressure. Your doctor may also want you to decrease the amount of sodium in your diet. Lowering sodium while following the DASH diet can lower blood pressure even further than just the DASH diet alone. Follow-up care is a key part of your treatment and safety. Be sure to make and go to all appointments, and call your doctor if you are having problems. It's also a good idea to know your test results and keep a list of the medicines you take. How can you care for yourself at home? Following the DASH diet · Eat 4 to 5 servings of fruit each day. A serving is 1 medium-sized piece of fruit, ½ cup chopped or canned fruit, 1/4 cup dried fruit, or 4 ounces (½ cup) of fruit juice. Choose fruit more often than fruit juice. · Eat 4 to 5 servings of vegetables each day. A serving is 1 cup of lettuce or raw leafy vegetables, ½ cup of chopped or cooked vegetables, or 4 ounces (½ cup) of vegetable juice. Choose vegetables more often than vegetable juice. · Get 2 to 3 servings of low-fat and fat-free dairy each day. A serving is 8 ounces of milk, 1 cup of yogurt, or 1 ½ ounces of cheese. · Eat 6 to 8 servings of grains each day.  A serving is 1 slice of bread, 1 ounce of dry cereal, or ½ cup of cooked rice, pasta, or cooked cereal. Try to choose whole-grain products as much as possible. · Limit lean meat, poultry, and fish to 2 servings each day. A serving is 3 ounces, about the size of a deck of cards. · Eat 4 to 5 servings of nuts, seeds, and legumes (cooked dried beans, lentils, and split peas) each week. A serving is 1/3 cup of nuts, 2 tablespoons of seeds, or ½ cup of cooked beans or peas. · Limit fats and oils to 2 to 3 servings each day. A serving is 1 teaspoon of vegetable oil or 2 tablespoons of salad dressing. · Limit sweets and added sugars to 5 servings or less a week. A serving is 1 tablespoon jelly or jam, ½ cup sorbet, or 1 cup of lemonade. · Eat less than 2,300 milligrams (mg) of sodium a day. If you limit your sodium to 1,500 mg a day, you can lower your blood pressure even more. · Be aware that all of these are the suggested number of servings for people who eat 1,800 to 2,000 calories a day. Your recommended number of servings may be different if you need more or fewer calories. Tips for success · Start small. Do not try to make dramatic changes to your diet all at once. You might feel that you are missing out on your favorite foods and then be more likely to not follow the plan. Make small changes, and stick with them. Once those changes become habit, add a few more changes. · Try some of the following: ? Make it a goal to eat a fruit or vegetable at every meal and at snacks. This will make it easy to get the recommended amount of fruits and vegetables each day. ? Try yogurt topped with fruit and nuts for a snack or healthy dessert. ? Add lettuce, tomato, cucumber, and onion to sandwiches. ? Combine a ready-made pizza crust with low-fat mozzarella cheese and lots of vegetable toppings. Try using tomatoes, squash, spinach, broccoli, carrots, cauliflower, and onions. ?  Have a variety of cut-up vegetables with a low-fat dip as an appetizer instead of chips and dip. ? Sprinkle sunflower seeds or chopped almonds over salads. Or try adding chopped walnuts or almonds to cooked vegetables. ? Try some vegetarian meals using beans and peas. Add garbanzo or kidney beans to salads. Make burritos and tacos with mashed rodriguez beans or black beans. Where can you learn more? Go to http://www.gray.com/ Enter L306 in the search box to learn more about \"DASH Diet: Care Instructions. \" Current as of: August 31, 2020               Content Version: 12.8 © 8423-3350 [employer]. Care instructions adapted under license by [employer] (which disclaims liability or warranty for this information). If you have questions about a medical condition or this instruction, always ask your healthcare professional. [address] any warranty or liability for your use of this information.

## 2021-04-12 NOTE — PROGRESS NOTES
1. Have you been to the ER, urgent care clinic since your last visit? Hospitalized since your last visit? [facility] urgent care last Sunday for cough [facility]. 2. Have you seen or consulted any other health care providers outside of the [address] [place] [address] since your last visit? Include any pap smears or colon screening.  No    Chief Complaint   Patient presents with    Hypertension    Thyroid Problem    Chronic Kidney Disease    Cholesterol Problem    Erectile Dysfunction

## 2021-04-27 RX ORDER — LEVOTHYROXINE SODIUM 150 UG/1
150 TABLET ORAL
Qty: 90 TAB | Refills: 3 | Status: CANCELLED | OUTPATIENT
Start: 2021-04-27

## 2021-04-27 NOTE — TELEPHONE ENCOUNTER
This patient contacted office for the following prescriptions to be filled:    Last office visit:   Follow up appointment: 5/10/21  Medication requested :   Requested Prescriptions     Pending Prescriptions Disp Refills    levothyroxine (SYNTHROID) 150 mcg tablet 90 Tab 3     Sig: Take 1 Tab by mouth Daily (before breakfast).      PCP: clemente  Mail order or Local pharmacy name [facility] [phone]

## 2021-04-27 NOTE — TELEPHONE ENCOUNTER
Last OV 04/12/2021  Next OV 05/10/2021  Last TSH 02/11/2021  Patient aware that levothyroxine has refills

## 2021-05-10 ENCOUNTER — HOSPITAL ENCOUNTER (OUTPATIENT)
Dept: LAB | Age: 76
Discharge: HOME | End: 2021-05-10
Payer: MEDICARE

## 2021-05-10 ENCOUNTER — OFFICE VISIT (OUTPATIENT)
Dept: FAMILY MEDICINE CLINIC | Age: 76
End: 2021-05-10
Payer: MEDICARE

## 2021-05-10 VITALS
HEIGHT: 66 IN | WEIGHT: 156.4 LBS | TEMPERATURE: 97.3 F | RESPIRATION RATE: 16 BRPM | DIASTOLIC BLOOD PRESSURE: 60 MMHG | BODY MASS INDEX: 25.13 KG/M2 | HEART RATE: 62 BPM | SYSTOLIC BLOOD PRESSURE: 124 MMHG | OXYGEN SATURATION: 98 %

## 2021-05-10 DIAGNOSIS — R93.89 ABNORMAL CXR: ICD-10-CM

## 2021-05-10 DIAGNOSIS — N18.31 STAGE 3A CHRONIC KIDNEY DISEASE (HCC): ICD-10-CM

## 2021-05-10 DIAGNOSIS — E03.9 ACQUIRED HYPOTHYROIDISM: ICD-10-CM

## 2021-05-10 DIAGNOSIS — Z72.0 TOBACCO USE: ICD-10-CM

## 2021-05-10 DIAGNOSIS — I10 ESSENTIAL HYPERTENSION: ICD-10-CM

## 2021-05-10 DIAGNOSIS — I10 ESSENTIAL HYPERTENSION: Primary | ICD-10-CM

## 2021-05-10 DIAGNOSIS — I42.9 CARDIOMYOPATHY, UNSPECIFIED TYPE (HCC): ICD-10-CM

## 2021-05-10 LAB
ANION GAP SERPL CALC-SCNC: 8 MMOL/L (ref 3–18)
BUN SERPL-MCNC: 19 MG/DL (ref 7–18)
BUN/CREAT SERPL: 13 (ref 12–20)
CALCIUM SERPL-MCNC: 8.1 MG/DL (ref 8.5–10.1)
CHLORIDE SERPL-SCNC: 112 MMOL/L (ref 100–111)
CO2 SERPL-SCNC: 24 MMOL/L (ref 21–32)
CREAT SERPL-MCNC: 1.45 MG/DL (ref 0.6–1.3)
GLUCOSE SERPL-MCNC: 89 MG/DL (ref 74–99)
POTASSIUM SERPL-SCNC: 4.1 MMOL/L (ref 3.5–5.5)
SODIUM SERPL-SCNC: 144 MMOL/L (ref 136–145)

## 2021-05-10 PROCEDURE — 1101F PT FALLS ASSESS-DOCD LE1/YR: CPT | Performed by: FAMILY MEDICINE

## 2021-05-10 PROCEDURE — G8752 SYS BP LESS 140: HCPCS | Performed by: FAMILY MEDICINE

## 2021-05-10 PROCEDURE — 36415 COLL VENOUS BLD VENIPUNCTURE: CPT

## 2021-05-10 PROCEDURE — 3017F COLORECTAL CA SCREEN DOC REV: CPT | Performed by: FAMILY MEDICINE

## 2021-05-10 PROCEDURE — G8536 NO DOC ELDER MAL SCRN: HCPCS | Performed by: FAMILY MEDICINE

## 2021-05-10 PROCEDURE — G8419 CALC BMI OUT NRM PARAM NOF/U: HCPCS | Performed by: FAMILY MEDICINE

## 2021-05-10 PROCEDURE — G8427 DOCREV CUR MEDS BY ELIG CLIN: HCPCS | Performed by: FAMILY MEDICINE

## 2021-05-10 PROCEDURE — 80048 BASIC METABOLIC PNL TOTAL CA: CPT

## 2021-05-10 PROCEDURE — 99214 OFFICE O/P EST MOD 30 MIN: CPT | Performed by: FAMILY MEDICINE

## 2021-05-10 PROCEDURE — G8754 DIAS BP LESS 90: HCPCS | Performed by: FAMILY MEDICINE

## 2021-05-10 PROCEDURE — G8510 SCR DEP NEG, NO PLAN REQD: HCPCS | Performed by: FAMILY MEDICINE

## 2021-05-10 PROCEDURE — G0463 HOSPITAL OUTPT CLINIC VISIT: HCPCS | Performed by: FAMILY MEDICINE

## 2021-05-10 RX ORDER — CLOBETASOL PROPIONATE 0.5 MG/G
OINTMENT TOPICAL 2 TIMES DAILY
Qty: 30 G | Refills: 0 | Status: SHIPPED | OUTPATIENT
Start: 2021-05-10

## 2021-05-10 NOTE — PATIENT INSTRUCTIONS
DASH Diet: Care Instructions  Your Care Instructions     The DASH diet is an eating plan that can help lower your blood pressure. DASH stands for Dietary Approaches to Stop Hypertension. Hypertension is high blood pressure. The DASH diet focuses on eating foods that are high in calcium, potassium, and magnesium. These nutrients can lower blood pressure. The foods that are highest in these nutrients are fruits, vegetables, low-fat dairy products, nuts, seeds, and legumes. But taking calcium, potassium, and magnesium supplements instead of eating foods that are high in those nutrients does not have the same effect. The DASH diet also includes whole grains, fish, and poultry. The DASH diet is one of several lifestyle changes your doctor may recommend to lower your high blood pressure. Your doctor may also want you to decrease the amount of sodium in your diet. Lowering sodium while following the DASH diet can lower blood pressure even further than just the DASH diet alone. Follow-up care is a key part of your treatment and safety. Be sure to make and go to all appointments, and call your doctor if you are having problems. It's also a good idea to know your test results and keep a list of the medicines you take. How can you care for yourself at home? Following the DASH diet  · Eat 4 to 5 servings of fruit each day. A serving is 1 medium-sized piece of fruit, ½ cup chopped or canned fruit, 1/4 cup dried fruit, or 4 ounces (½ cup) of fruit juice. Choose fruit more often than fruit juice. · Eat 4 to 5 servings of vegetables each day. A serving is 1 cup of lettuce or raw leafy vegetables, ½ cup of chopped or cooked vegetables, or 4 ounces (½ cup) of vegetable juice. Choose vegetables more often than vegetable juice. · Get 2 to 3 servings of low-fat and fat-free dairy each day. A serving is 8 ounces of milk, 1 cup of yogurt, or 1 ½ ounces of cheese. · Eat 6 to 8 servings of grains each day.  A serving is 1 slice of bread, 1 ounce of dry cereal, or ½ cup of cooked rice, pasta, or cooked cereal. Try to choose whole-grain products as much as possible. · Limit lean meat, poultry, and fish to 2 servings each day. A serving is 3 ounces, about the size of a deck of cards. · Eat 4 to 5 servings of nuts, seeds, and legumes (cooked dried beans, lentils, and split peas) each week. A serving is 1/3 cup of nuts, 2 tablespoons of seeds, or ½ cup of cooked beans or peas. · Limit fats and oils to 2 to 3 servings each day. A serving is 1 teaspoon of vegetable oil or 2 tablespoons of salad dressing. · Limit sweets and added sugars to 5 servings or less a week. A serving is 1 tablespoon jelly or jam, ½ cup sorbet, or 1 cup of lemonade. · Eat less than 2,300 milligrams (mg) of sodium a day. If you limit your sodium to 1,500 mg a day, you can lower your blood pressure even more. · Be aware that all of these are the suggested number of servings for people who eat 1,800 to 2,000 calories a day. Your recommended number of servings may be different if you need more or fewer calories. Tips for success  · Start small. Do not try to make dramatic changes to your diet all at once. You might feel that you are missing out on your favorite foods and then be more likely to not follow the plan. Make small changes, and stick with them. Once those changes become habit, add a few more changes. · Try some of the following:  ? Make it a goal to eat a fruit or vegetable at every meal and at snacks. This will make it easy to get the recommended amount of fruits and vegetables each day. ? Try yogurt topped with fruit and nuts for a snack or healthy dessert. ? Add lettuce, tomato, cucumber, and onion to sandwiches. ? Combine a ready-made pizza crust with low-fat mozzarella cheese and lots of vegetable toppings. Try using tomatoes, squash, spinach, broccoli, carrots, cauliflower, and onions. ?  Have a variety of cut-up vegetables with a low-fat dip as an appetizer instead of chips and dip. ? Sprinkle sunflower seeds or chopped almonds over salads. Or try adding chopped walnuts or almonds to cooked vegetables. ? Try some vegetarian meals using beans and peas. Add garbanzo or kidney beans to salads. Make burritos and tacos with mashed rodriguez beans or black beans. Where can you learn more? Go to http://www.clifford.com/  Enter H967 in the search box to learn more about \"DASH Diet: Care Instructions. \"  Current as of: August 31, 2020               Content Version: 12.8  © 6258-7281 [employer]. Care instructions adapted under license by [employer] (which disclaims liability or warranty for this information). If you have questions about a medical condition or this instruction, always ask your healthcare professional. [address] any warranty or liability for your use of this information.

## 2021-05-10 NOTE — PROGRESS NOTES
1. Have you been to the ER, urgent care clinic since your last visit? Hospitalized since your last visit? No    2. Have you seen or consulted any other health care providers outside of the [address] [place] [address] since your last visit? Include any pap smears or colon screening.  No    Chief Complaint   Patient presents with    Hypertension    Thyroid Problem    Chronic Kidney Disease    Cholesterol Problem    Erectile Dysfunction    Skin Problem     itching - requests Rx for Clobetasol Propionate 0.05%

## 2021-05-10 NOTE — PROGRESS NOTES
Krish Marsh, 76 y.o.,  male    SUBJECTIVE  Ff-up      HTN-recently added felodipine with much better BP readings. Continues to take diovan 80 mg  hydralazine dose to 50 mg tid and coreg 25 mg bid. Pt with h/o CKD. HL-on mevacor    He is a chronic smoker, about 5 cig/ day. aaa screen neg. Old granulomatous disease on repeat CXR, however did not pursue CT chest- advised to get done to r/o malignancy (provided central scheduling # provided)    Hypothyroidism-reports  Compliant with 150 mcgs synthroid/day. Reviewed normal TSH 2/2021    Remote h/o throat ca s/p radiation/chemotherapy 2003    ED- on prn viagra  ROS:  See HPI, all others negative        Patient Active Problem List   Diagnosis Code    Essential hypertension I10    Acquired hypothyroidism E03.9    Tobacco use Z72.0    Stage 3a chronic kidney disease ([facility] [address] N18.31    Abnormal stress test R94.39    [facility] I42.9       Current Outpatient Medications   Medication Sig Dispense Refill    clobetasoL (TEMOVATE) 0.05 % ointment Apply  to affected area two (2) times a day. 30 g 0    felodipine (PLENDIL SR) 5 mg 24 hr tablet Take 1 Tab by mouth daily. 90 Tab 0    carvediloL (Coreg) 25 mg tablet Take 1 Tab by mouth two (2) times daily (with meals). 180 Tab 0    valsartan (DIOVAN) 80 mg tablet Take 1 Tab by mouth daily. 30 Tab 2    sildenafil citrate (Viagra) 50 mg tablet Take 1 Tab by mouth as needed for Erectile Dysfunction. 12 Tab 5    levothyroxine (SYNTHROID) 150 mcg tablet Take 1 Tab by mouth Daily (before breakfast). 90 Tab 3    hydrALAZINE (APRESOLINE) 50 mg tablet Take 1 Tab by mouth three (3) times daily. 270 Tab 3    lovastatin (MEVACOR) 20 mg tablet Take 2 Tabs by mouth nightly.  180 Tab 3       Allergies   Allergen Reactions    Lisinopril Rash    Norvasc [Amlodipine] Itching       Past Medical History:   Diagnosis Date    [facility] 2012    throat    Gout     Hx of colonoscopy with polypectomy 11/15/2016    rpt 5 years 2021, dr. Aj Munoz Hypertension     Pancreatitis 1989    Thyroid disease        Social History     Socioeconomic History    Marital status:      Spouse name: Not on file    Number of children: Not on file    Years of education: Not on file    Highest education level: Not on file   Occupational History    Not on file   Social Needs    Financial resource strain: Not on file    Food insecurity     Worry: Not on file     Inability: Not on file    Transportation needs     Medical: Not on file     Non-medical: Not on file   Tobacco Use    Smoking status: Current Every Day Smoker     Packs/day: 0.25     Years: 50.00     Pack years: 12.50    Smokeless tobacco: Never Used   Substance and Sexual Activity    Alcohol use: Yes     Comment: 3    Drug use: No    Sexual activity: Yes     Partners: Female   Lifestyle    Physical activity     Days per week: Not on file     Minutes per session: Not on file    Stress: Not on file   Relationships    Social connections     Talks on phone: Not on file     Gets together: Not on file     Attends Islam service: Not on file     Active member of club or organization: Not on file     Attends meetings of clubs or organizations: Not on file     Relationship status: Not on file    Intimate partner violence     Fear of current or ex partner: Not on file     Emotionally abused: Not on file     Physically abused: Not on file     Forced sexual activity: Not on file   Other Topics Concern    Not on file   Social History Narrative    Not on file       Family History   Problem Relation Age of Onset    Cancer Father          OBJECTIVE    Physical Exam:     Visit Vitals  /60 (BP 1 Location: Left upper arm, BP Patient Position: Sitting, BP Cuff Size: Adult)   Pulse 62   Temp 97.3 °F (36.3 °C) (Temporal)   Resp 16   Ht 5' 6\" (1.676 m)   Wt 156 lb 6.4 oz (70.9 kg)   SpO2 98%   BMI 25.24 kg/m²       General: alert, elderly, AA, in no apparent distress or pain  Head: atraumatic. Non-tender maxillary and frontal sinuses  Neck: supple, no adenopathy palpated  CVS: normal rate, regular rhythm, distinct S1 and S2  Lungs:clear to ausculation bilaterally, no crackles, wheezing or rhonchi noted  Abdomen: normoactive bowel sounds, soft, non-tender  Extremities: no edema, no cyanosis, MSK grossly normal  Skin: warm, no lesions, rashes noted  Psych:  mood and affect normal        ASSESSMENT/PLAN  Diagnoses and all orders for this visit:    1. Essential hypertension  Improved, now controlled  cont felodipine 5 mg  Cont coreg 25 mg bid  Cont hydralazine 50 mg tid  Cont diovan- reminded on repeat BMP (ordered by cards) to follow renal function  ? allergy to norvasc/lisinopril  Cont  mevacor  cmp prior to next visit  DASH Diet    2. Acquired hypothyroidism  Stable  Cont 150 mcgs synthroid/day  TSH 2/2021 normal    3. Tobacco use  Discussed complete cessation    4. Stage 3a chronic kidney disease  Monitoring    5. Chest pain, unspecified type  Atypical  Stress test/echo 2/2021 normal    6. Abnormal CXR  Old granuloma, advised to pursue Chest Ct with smoking h/o    7. Pure hypercholesterolemia  Cont mevacor    8. Erectile dysfunction, unspecified  Cont prn viagra    Follow-up and Dispositions    · Return in about 6 months (around 11/10/2021), or if symptoms worsen or fail to improve, for routine chronic illness care, non-fasting labs prior to your next visit. Patient understands plan of care. Patient has provided input and agrees with goals.

## 2021-05-11 ENCOUNTER — TELEPHONE (OUTPATIENT)
Dept: CARDIOLOGY CLINIC | Age: 76
End: 2021-05-11

## 2021-05-11 NOTE — TELEPHONE ENCOUNTER
Called and left message on patient voicemail regarding lab/test per Dr. Edilma Martinez, lab reviewed no significant change in renal function continue to monitor. If any questions to call the office.

## 2021-05-11 NOTE — PROGRESS NOTES
Called patient, verified name and date of birth. Informed patient that lab results were in and advised of previous result note from Dr. Edith Bhatti . Patient verbalized understanding and had no further questions.

## 2021-05-11 NOTE — TELEPHONE ENCOUNTER
----- Message from Matthew Lee MD sent at 5/11/2021  7:19 AM EDT -----  No significant change in renal function continue to monitor

## 2021-06-14 DIAGNOSIS — I10 ESSENTIAL HYPERTENSION: ICD-10-CM

## 2021-06-14 RX ORDER — VALSARTAN 80 MG/1
80 TABLET ORAL DAILY
Qty: 30 TABLET | Refills: 2 | Status: SHIPPED | OUTPATIENT
Start: 2021-06-14 | End: 2021-09-13 | Stop reason: SDUPTHER

## 2021-06-14 NOTE — TELEPHONE ENCOUNTER
This patient contacted office for the following prescriptions to be filled:    Medication requested :   Requested Prescriptions     Pending Prescriptions Disp Refills    valsartan (DIOVAN) 80 mg tablet 30 Tablet 2     Sig: Take 1 Tablet by mouth daily.      PCP: [facility] [address] or Print: Jean Marie Handy   Mail order or [address] MAIN     Scheduled appointment if not seen by current providers in office:  lov 3/16/2021 F/U 6/17/2021

## 2021-06-17 ENCOUNTER — OFFICE VISIT (OUTPATIENT)
Dept: CARDIOLOGY CLINIC | Age: 76
End: 2021-06-17
Payer: MEDICARE

## 2021-06-17 VITALS
DIASTOLIC BLOOD PRESSURE: 56 MMHG | WEIGHT: 155 LBS | SYSTOLIC BLOOD PRESSURE: 125 MMHG | HEIGHT: 66 IN | BODY MASS INDEX: 24.91 KG/M2 | HEART RATE: 51 BPM

## 2021-06-17 DIAGNOSIS — E78.5 HYPERLIPIDEMIA, UNSPECIFIED HYPERLIPIDEMIA TYPE: ICD-10-CM

## 2021-06-17 DIAGNOSIS — N18.31 STAGE 3A CHRONIC KIDNEY DISEASE (HCC): ICD-10-CM

## 2021-06-17 DIAGNOSIS — I10 ESSENTIAL HYPERTENSION: Primary | ICD-10-CM

## 2021-06-17 DIAGNOSIS — I42.9 CARDIOMYOPATHY, UNSPECIFIED TYPE (HCC): ICD-10-CM

## 2021-06-17 PROCEDURE — 1101F PT FALLS ASSESS-DOCD LE1/YR: CPT | Performed by: INTERNAL MEDICINE

## 2021-06-17 PROCEDURE — G8432 DEP SCR NOT DOC, RNG: HCPCS | Performed by: INTERNAL MEDICINE

## 2021-06-17 PROCEDURE — G8419 CALC BMI OUT NRM PARAM NOF/U: HCPCS | Performed by: INTERNAL MEDICINE

## 2021-06-17 PROCEDURE — G8754 DIAS BP LESS 90: HCPCS | Performed by: INTERNAL MEDICINE

## 2021-06-17 PROCEDURE — G8752 SYS BP LESS 140: HCPCS | Performed by: INTERNAL MEDICINE

## 2021-06-17 PROCEDURE — G8536 NO DOC ELDER MAL SCRN: HCPCS | Performed by: INTERNAL MEDICINE

## 2021-06-17 PROCEDURE — G8427 DOCREV CUR MEDS BY ELIG CLIN: HCPCS | Performed by: INTERNAL MEDICINE

## 2021-06-17 PROCEDURE — 99214 OFFICE O/P EST MOD 30 MIN: CPT | Performed by: INTERNAL MEDICINE

## 2021-06-17 PROCEDURE — 3017F COLORECTAL CA SCREEN DOC REV: CPT | Performed by: INTERNAL MEDICINE

## 2021-06-17 NOTE — PROGRESS NOTES
1. Have you been to the ER, urgent care clinic since your last visit? Hospitalized since your last visit?     no  2. Have you seen or consulted any other health care providers outside of the [address] [place] [address] since your last visit? Include any pap smears or colon screening.       No

## 2021-06-17 NOTE — PROGRESS NOTES
HISTORY OF PRESENT ILLNESS  Shanice Sarkar is a 76 y.o. male. 2/12/2021  Patient seen today for new patient evaluation. He is referred here for evaluation of chest pain. Patient was in emergency room May 2020 with episodes of chest pain. Patient sharp pain in the chest at that time. Since that time he has not had any recurrence of chest pain. He denies any shortness of breath, dizziness palpitation or other symptom. Patient subsequent stress test in 12/2020 that was reported abnormal due to low ejection fraction. It was thought to be under calculation possibly from LVH. Perfusion was reported normal.  3/16/2021  Patient presents to f/u for HTN and echo results. He reports Coreg recently increased to 25 mg /BID by PCP, and b/p remains elevated. Review of Systems   Constitutional: Negative for chills and fever. HENT: Negative for nosebleeds. Eyes: Negative for blurred vision and double vision. Respiratory: Negative for cough, hemoptysis, sputum production, shortness of breath and wheezing. Cardiovascular: Negative for chest pain, palpitations, orthopnea, claudication, leg swelling and PND. Gastrointestinal: Negative for abdominal pain, heartburn, nausea and vomiting. Musculoskeletal: Negative for myalgias. Skin: Negative for rash. Neurological: Negative for dizziness, weakness and headaches. Endo/Heme/Allergies: Does not bruise/bleed easily.      Family History   Problem Relation Age of Onset    Cancer Father        Past Medical History:   Diagnosis Date    Cancer [facility]) 2012    throat    Gout     Hx of colonoscopy with polypectomy 11/15/2016    rpt 5 years 2021, dr. Emelia Milian    Hypertension     Pancreatitis 1989    Thyroid disease        Past Surgical History:   Procedure Laterality Date    HX COLONOSCOPY  11/15/2016    repeat 5 years 2021, Dr Roland Denver         Social History     Tobacco Use    Smoking status: Current Every Day Smoker     Packs/day: 0.25     Years: 50.00     Pack years: 12.50    Smokeless tobacco: Never Used   Substance Use Topics    Alcohol use: Yes     Comment: 3       Allergies   Allergen Reactions    Lisinopril Rash    Norvasc [Amlodipine] Itching       Prior to Admission medications    Medication Sig Start Date End Date Taking? Authorizing Provider   valsartan (DIOVAN) 80 mg tablet Take 1 Tablet by mouth daily. 6/14/21  Yes Jacque Mari NP   clobetasoL (TEMOVATE) 0.05 % ointment Apply  to affected area two (2) times a day. 5/10/21  Yes Jayme Gonzalez MD   felodipine (PLENDIL SR) 5 mg 24 hr tablet Take 1 Tab by mouth daily. 4/12/21  Yes Jayme Gonzalez MD   carvediloL (Coreg) 25 mg tablet Take 1 Tab by mouth two (2) times daily (with meals). 4/12/21  Yes Jayme Gonzalez MD   sildenafil citrate (Viagra) 50 mg tablet Take 1 Tab by mouth as needed for Erectile Dysfunction. 3/11/21  Yes Jayme Gonzalez MD   levothyroxine (SYNTHROID) 150 mcg tablet Take 1 Tab by mouth Daily (before breakfast). 2/23/21  Yes Margoth Canada MD   hydrALAZINE (APRESOLINE) 50 mg tablet Take 1 Tab by mouth three (3) times daily. 2/12/21  Yes Andre Hopson MD   lovastatin (MEVACOR) 20 mg tablet Take 2 Tabs by mouth nightly. 2/11/21  Yes Jayme Gonzalez MD         Visit Vitals  BP (!) 125/56   Pulse (!) 51   Ht 5' 6\" (1.676 m)   Wt 70.3 kg (155 lb)   BMI 25.02 kg/m²         Physical Exam  Vitals and nursing note reviewed. Constitutional:       Appearance: He is well-developed. HENT:      Head: Normocephalic and atraumatic. Eyes:      Conjunctiva/sclera: Conjunctivae normal.   Neck:      Thyroid: No thyromegaly. Vascular: No JVD. Trachea: No tracheal deviation. Cardiovascular:      Rate and Rhythm: Normal rate and regular rhythm. Heart sounds: Murmur heard. No friction rub. No gallop. Pulmonary:      Effort: No respiratory distress. Breath sounds: Normal breath sounds.  No wheezing or rales.   Chest:      Chest wall: No tenderness. Abdominal:      Palpations: Abdomen is soft. Tenderness: There is no abdominal tenderness. Musculoskeletal:      Cervical back: Neck supple. Skin:     General: Skin is warm and dry. Neurological:      Mental Status: He is alert and oriented to person, place, and time. Mr. Krishan Pederson has a reminder for a \"due or due soon\" health maintenance. I have asked that he contact his primary care provider for follow-up on this health maintenance. No flowsheet data found. I have personally reviewed patient's records available from hospital and other providers and incorporated findings in patient care. Notes, lab, EKG, stress test, chest x-ray  Procedure Conclusion 12/2020    Nuclear Stress Test    Nuclear Cardiac Spect Rest then Gated Stress study. Manus Gentleman was used as the stressing method and agent. (Manus Gentleman given via a 10 - 20 sec injection.)   One day myocardial perfusion study. Date: 12/9/2020. Left ventricular perfusion is normal. Myocardial perfusion imaging supports an intermediate risk stress test.   There is no prior study available for comparison. .   Interpretation Summary 12/2020    · Baseline ECG: Sinus bradycardia. LVH with repolorization; possible left atrial enlargement  · Gated SPECT: Left ventricular function post-stress was abnormal. Calculated ejection fraction is 47%. There is no evidence of transient ischemic dilation (TID). The TID ratio is 1.17. · Left ventricular perfusion is normal, no ischemia or previous infarct. · Myocardial perfusion imaging supports an intermediate risk stress test due to calculated EF of 47%. Significant LVH present, possibly leading to underestimation of EF. Clinical correlation recommended. Echo 3/4/2021  Interpretation Summary  · LV: Estimated LVEF is 60 - 65%. Normal cavity size and systolic function (ejection fraction normal). Severe apical hypertrophy. Moderate septal wall hypertrophy.  Moderate posterior wall hypertrophy. Wall motion: normal. Moderate (grade 2) left ventricular diastolic dysfunction. · LA: Left Atrium volume index is 30.05 mL/m2. · RV: Normal right ventricular size and function. · MV: Mild mitral valve regurgitation is present. IMPRESSION chest x-ray 12/2020    No acute pulmonic disease. Probable old granulomatous disease. Assessment         ICD-10-CM ICD-9-CM    1. Essential hypertension  I10 401.9     Stable. Better controlled continue current treatment   2. Cardiomyopathy, unspecified type ([address]  I42.9 425.4     Hypertrophic stable monitor   3. Stage 3a chronic kidney disease (HCC)  N18.31 585. 3     Stable last lab 5/2021 reviewed. Continue treatment   4. Hyperlipidemia, unspecified hyperlipidemia type  E78.5 272.4     Continue current treatment lab with PCP     2/2021  Seen for low ejection fraction reported on nuclear scan. History of severe hypertension uncontrolled. Increase hydralazine and Coreg. Has side effect with lisinopril and amlodipine which are off. Check echo for LV function. Continue with other therapy  3/2021  Echo revealed EF 60-65% - with severe apical hypertrophy likely due to hypertensive heart disease. Blood pressure is uncontrolled. Will begin Valsartan 80 mg/ Day   Check BMP in 1 week and follow home chart if able. Encouraged smoking cessation   6/2021  Cardiac status stable. Hypertension much better controlled. Renal function stable continue current medical management  There are no discontinued medications. No orders of the defined types were placed in this encounter. Follow-up and Dispositions    · Return in about 6 months (around 12/17/2021).          Ebonie Buck MD

## 2021-07-07 NOTE — TELEPHONE ENCOUNTER
This patient contacted office for the following prescriptions to be filled:    Medication requested :   Requested Prescriptions     Pending Prescriptions Disp Refills    felodipine (PLENDIL SR) 5 mg 24 hr tablet 90 Tablet 0     Sig: Take 1 Tablet by mouth daily.      PCP:  [address] or Print: [facility]man  Mail order or Local pharmacy [address]     Scheduled appointment if not seen by current providers in office: LOV 5/10/2021 f/u 11/11/2021

## 2021-07-08 RX ORDER — FELODIPINE 5 MG/1
5 TABLET, EXTENDED RELEASE ORAL DAILY
Qty: 90 TABLET | Refills: 1 | Status: SHIPPED | OUTPATIENT
Start: 2021-07-08 | End: 2022-01-10 | Stop reason: SDUPTHER

## 2021-07-20 RX ORDER — CARVEDILOL 25 MG/1
25 TABLET ORAL 2 TIMES DAILY WITH MEALS
Qty: 180 TABLET | Refills: 0 | Status: SHIPPED | OUTPATIENT
Start: 2021-07-20 | End: 2022-02-10 | Stop reason: SDUPTHER

## 2021-07-20 NOTE — TELEPHONE ENCOUNTER
This patient contacted office for the following prescriptions to be filled:    Medication requested :   Requested Prescriptions     Pending Prescriptions Disp Refills    carvediloL (Coreg) 25 mg tablet 180 Tablet 0     Sig: Take 1 Tablet by mouth two (2) times daily (with meals).      PCP: [address] [place] [address] or Print: Aman   Mail order or Local pharmacy [facility] [address]     Scheduled appointment if not seen by current providers in office: LOV 5/10/2021 f/u 11/11/2021

## 2021-09-13 DIAGNOSIS — I10 ESSENTIAL HYPERTENSION: ICD-10-CM

## 2021-09-13 RX ORDER — VALSARTAN 80 MG/1
80 TABLET ORAL DAILY
Qty: 90 TABLET | Refills: 1 | Status: SHIPPED | OUTPATIENT
Start: 2021-09-13 | End: 2022-03-14 | Stop reason: SDUPTHER

## 2021-09-13 NOTE — TELEPHONE ENCOUNTER
This patient contacted office for the following prescriptions to be filled:    Medication requested :   Requested Prescriptions     Pending Prescriptions Disp Refills    valsartan (DIOVAN) 80 mg tablet 30 Tablet 2     Sig: Take 1 Tablet by mouth daily.      PCP: [address] [place] [address] or Print: Aman  Mail order or Local pharmacy [address]     Scheduled appointment if not seen by current providers in office: LOV 5/10/2021 f/u 11/11/2021

## 2021-10-13 NOTE — PROGRESS NOTES
1. Have you been to the ER, urgent care clinic since your last visit? Hospitalized since your last visit? Yes x 2 [facility] pain  2. Have you seen or consulted any other health care providers outside of the [address] e your last visit? Include any pap smears or colon screening.  No [facility]    Medicine Progress Note - Hospitalist Service       Date of Admission:  10/8/2021    Assessment & Plan                  Remington Gutierrez is a 77 year old male admitted on 10/8/2021. [facility] with history of COPD, systolic heart failure, S/P AICD, type 2 diabetes, and anxiety/depression. He presented with increasing shortness of breath for 4 days, especially with activity and lying down, nonproductive cough, and was found to be COVID positive.    # Acute Hypoxic Respiratory Failure secondary to COVID-19 infection  # Viral Pneumonia secondary to COVID-19 infection     Symptom Onset 10/4/2021   Date of 1st Positive Test 10/8/2021   Vaccination Status Partially Vaccinated - 1st dose < 1 week prior to onset of symptoms          Initial chest x-ray demonstrated bilateral interstitial and groundglass opacities consistent with pneumonia.  He initially required oxygen at 5 L/min per nasal cannula, though he came more hypoxemic 10/9/2021, requiring the use of HFNC oxygen.  Current needs are 45 L/min and FiO2 0.75, unchanged from yesterday.  He is less dyspneic than yesterday.  Rest dyspnea is now mild.  However, he is still significantly short of breath with any exertion, including transferring to the recliner.  His cough persists, though no longer bothersome.  He has no respiratory chest pain.  He remains afebrile.      - Continue care on the medical floor under COVID-19 special precautions and with continuous oximetry.  - Oxygen: Continue HFNC 45 L/min with FiO2 0.75, and titrate to keep SpO2 between 90-96%.  - Labs: .0 --> 100.0 --> 50.5 --> 34.3 --> 16.2,  --> 714 --> 637, fibrinogen 633 --> 677 --> 668 --> 650 --> 556, d-Dimer 1.43 --> 1.17 --> 0.67 --> 0.64 --> 0.61.  Trend toward improvement continues, and some values are approaching normal.  We have discontinued daily monitoring.    - Imaging: No additional imaging necessary.  - Breathing  treatments:  Continue albuterol HFA and home tiotropium inhalers as below; avoid nebulizers in favor of MDIs.  - IV fluids: None currently indicated.  - Antibiotics: Not indicated.  - COVID-Focused Medications:   Dexamethasone 6 mg  X 10 days started on 10/8/2021.   We were initially unable to give remdesivir due to GFR < 30 on admission, though remdesivir was eventually started 10/10/2021.  We will plan on a 5-day course IV.  Tocilizumab 600 mg IV given 10/10/2021 after discussion with Dr. Tapia, infectious disease.  - DVT Prophylaxis:   On admission started heparin 5000 units every 8 hours due to TANIA, then changed to a low intensity heparin infusion when hypoxemia worsened.  Kidney injury subsequently improved, and the patient was clinically stable in terms of O2 needs, so heparin was discontinued and enoxaparin 0.5 mg/kg subcu every 12 hours started and continues.  Consider anticoag on discharge for 30 days & until return to normal mobility     Acute Kidney Injury  Chronic kidney disease stage 2  Baseline GFR appears to be 60-65.  Admission GFR 29, likely representing acute kidney injury due to volume depletion from acute illness.  He received a 500 mL IV fluid bolus in ED, and preadmission lisinopril, furosemide, and Mobic were held.  There is been a gradual improvement in GFR: 29 --> 33 --> 38 --> 49 --> 60, back to his baseline range.  -Lisinopril can be resumed when needed for the control of hypertension, though is not currently needed.  -Preadmission furosemide 20 mg p.o. twice daily has been resumed due to underlying systolic congestive heart failure, see below.  -Hold Mobic until necessary for symptom control.    Acute encephalopathy due to COVID-19   Acute toxic encephalopathy due to zolpidem 10/12 - 10/13/2021  Possible mild cognitive impairment/dementia  Noted to have some cognitive impairment on chart review, but not quantified, and no specific diagnosis has been made.  He was more acutely confused  in the ER, pulling out IV's x 2 and removing his oxygen.  However, at time of admission H+P he was improved and appeared at cognitive baseline.  There had been no additional confusion or agitation until the early morning hours of 10/13/2021, at which time the patient was noted to be confused, agitated, and taking out his HFNC cannula.  He had gotten zolpidem 6.25 mg p.o. at bedtime, not a new medication for him, those perhaps the combination of zolpidem and dexamethasone was enough to cause this morning's episode.  I think we need to find an alternative to zolpidem during this inpatient stay.  -Discontinue zolpidem.  -Begin ramelteon 8 mg p.o. nightly, first dose tonight.  -Continue to observe mental status.    Chronic Systolic Congestive Heart Failure   Dilated Cardiomyopathy  Essential hypertension   Noted to have history of systolic CHF and dilated cardiomyopathy. [facility]. Non-ishemic cardiomyopathy. ECHO 4/2021 - EF 25-30%. Severe LV dilation. Global hypokinesis. RVSF mildly reduced. Cardiac MRI 2/2021- c/w Non-ishemic cardiomyopathy. Ascending aorta 4.1 cm. Admitted with acute CHF [facility] 9/7-12/2021. Dry weight 202# per [facility] discharge summary 9/2021    On admission BNP elevated 3,907 (similar to 7/2021 ED visit for CHF/COPD). CXR on admission consistent with COVID pneumonia.  On exam today, there is no suggestion of exacerbation of CHF.  Echocardiogram performed 10/11/2021 demonstrated a moderately dilated left ventricle, with severely reduced left ventricular systolic function, ejection fraction 30 to 35%.  Global hypokinesis was seen.  There was also grade 2 or moderate diastolic dysfunction noted.    -Continue preadmission furosemide 20 mg p.o. twice daily.  -Acute kidney injury has improved to an extent that lisinopril can be resumed.  However, the patient's blood pressure has been borderline low.    -Will hold home metoprolol XL 25 daily due to bradycardia, see below.   -Follow daily weights and  I/Os.       Bradycardia  The patient had heart rates reported as low as 38/min overnight.  I am a little surprised to hear that he can achieve heart rates this low as he has a pacer/AICD device in place, which was functioning on admission EKG.  Nonetheless, it does not appear that he had significant associated symptoms.  Bradycardia is probably a result of combined remdesivir and metoprolol therapy.  Because I would like for him to receive 5 days of remdesivir, we have put a temporary hold on metoprolol.  -Hold metoprolol XL 25 mg daily until remdesivir course is completed.  -Monitor for additional bradycardia while on remdesivir.    COPD    Noted on chart review. PFTs unavailable. Not on home oxygen.  Today's exam suggests mild to moderate expiratory phase prolongation, without wheeze.  -Continued preadmission albuterol scheduled 4 times daily and every 4 hours as needed.  -Continue preadmission tiotropium.      Elevated troponin   Initial troponin 0.107. ECG showed paced rhythm, limiting interpretation. Asymptomatic; patient reported no chest pain prior to admission or since admission.  Suspect this elevation represents strain related to COVID pneumonia and hypoxemic failure.  Next troponins were normal at 0.023 --> 0.019.  -No additional troponin monitoring planned.    Diabetes Mellitus, Type 2  Managed prior to admission with empagliflozin 12.5 mg daily, glipizide 5 mg daily, and metformin 500 mg twice daily.  These outpatient medications have been held since admission. Hemoglobin A1c 7.2 on 10/5/2021.  The patient has been hyperglycemic since dexamethasone was added.  He is currently receiving glargine 35 units subcu every morning, prandial NovoLog 6 units subcu 3 times daily AC, and a NovoLog medium insulin resistance sliding scale.  Glucose control has been trending toward improvement, 126 - 156 over the last 3 checks.  -Continue glargine 35 units subcu every morning.  -Continue prandial 6 units subcu 3 times  daily AC.  -Continue NovoLog medium resistance sliding scale.  -We will hold empagliflozin, glipizide, and Metformin for now.    BENNY  Mild, but severe supine by sleep study 2008. Reports using CPAP at home,   - Using oxygen here for now.    - Avoid supine position while sleeping off CPAP    Anxiety and Depression  Insomnia  Previously diagnosed, and managed prior to admission with lamotrigine 150 mg twice daily and paroxetine 40 mg p.o. nightly, both continued here.  Mood and affect have been stable this hospital stay.    -Continue preadmission lamotrigine and paroxetine.  -Out of concern that zolpidem is causing morning agitated delirium, we are going to discontinue zolpidem, and begin ramelteon 8 mg p.o. nightly starting tonight.    Sinus node dysfunction   S/P AICD 5/4/2021  Paced rhythm noted on admission ECG.  I cannot find details in the patient's past medical records as to the properties of his implantable device.  However, it does appear to be a pacemaker based upon the appearance of his admission EKG.  As result, I am a bit surprised to see that he had heart rates as low as 38/min overnight, discussed under bradycardia above.    Rosacea  Continue home doxycycline daily.           Diet: Combination Diet Consistent Carb 75 Grams CHO per Meal Diet; Easy to Chew (level 7); Thin Liquids (level 0)    DVT Prophylaxis: Enoxaparin 0.5 mg subcu every 12 hours.  Rose Catheter: Not present  Central Lines: None  Code Status: Full Code      Disposition Plan   Expected discharge: I anticipate he will be ready for discharge in 3-5 additional hospital days, once oxygen needs are stable, home supplemental oxygen arranged if required, and symptoms are manageable.          Sander Ware MD  Hospitalist Service  [facility]  Securely message with the Vocera Web Console (learn more here)  Text page via [employer] Paging/Directory             "  ______________________________________________________________________    Interval History   He gave me a \"thumbs up\" when I asked how he is doing.  He is having less dyspnea than yesterday, both at rest and with exertion.  Exertional dyspnea, however, remains significant.  His cough persist, but does not bother him.  He has no respiratory chest pain.  He is eating well, without dysphagia, nausea, vomiting, or diarrhea.  He denies any musculoskeletal pain.    Vitals:    10/10/21 0235 10/12/21 0300   Weight: 84.7 kg (186 lb 11.7 oz) 81.9 kg (180 lb 8.9 oz)     Wt Readings from Last 4 Encounters:   10/12/21 81.9 kg (180 lb 8.9 oz)   07/23/21 99.8 kg (220 lb)   03/02/21 99.8 kg (220 lb)   02/21/20 97.4 kg (214 lb 12.8 oz)         Data reviewed today: I reviewed all medications, new labs and imaging results over the last 24 hours. I personally reviewed no images or EKG's today.    Physical Exam   Vital Signs: Temp: 97.7  F (36.5  C) Temp src: Oral BP: 117/65 Pulse: 58   Resp: 20 SpO2: 93 % O2 Device: High Flow Nasal Cannula (HFNC) Oxygen Delivery: 45 LPM  Weight: 180 lbs 8.91 oz    GENERAL: Pleasant man, seated in bed.  He appears comfortable.  EYES: Eyes grossly normal to inspection, extraocular movements intact  HENT: HFNC in place.  NECK: Trachea midline, no stridor.    RESP: No accessory muscle use.  Lungs are now clear throughout on inspiration.  Expiratory phase is quiet and moderately prolonged, though is clear, no wheezing.   CV: Regular rate and rhythm, non-tachycardic.  Normal S1 S2, no murmur or extra sound.  No lower extremity edema.  ABDOMEN: Soft, non-tender, no guarding.  Liver and spleen not enlarged, no masses palpable.  Bowel sounds positive.  MS: No bony deformities noted.  No red or inflamed joints.  SKIN: Warm and dry, no rashes.  NEURO: Alert, oriented, conversant.  Cranial nerves III - XII grossly intact aside from being hard of hearing.  No gross motor or sensory deficits.  Gait steady, " symmetrical.  PSYCH: Calm, alert, conversant.  Able to articulate logical thoughts, no tangential thoughts, no hallucinations or delusions.  Affect normal.        Data     Recent Labs   Lab 10/13/21  1207 10/13/21  0748 10/13/21  0240 10/12/21  2151 10/12/21  1703 10/12/21  1221   * 133* 156* 269* 123* 132*       CMP  Recent Labs   Lab 10/13/21  1207 10/13/21  0748 10/13/21  0240 10/12/21  2151 10/12/21  0829 10/12/21  0504 10/11/21  0233 10/11/21  0215 10/10/21  0753 10/10/21  0459 10/09/21  0752 10/09/21  0521 10/08/21  1614 10/08/21  0442   NA  --   --   --   --   --  139  --  136  --  134  --  134  --  130*  130*   POTASSIUM  --   --   --   --   --  4.3  --  4.1  --  4.2  --  4.1  --  5.2   CHLORIDE  --   --   --   --   --  109  --  103  --  101  --  106  --  97   CO2  --   --   --   --   --  24  --  21  --  20  --  20  --  20   ANIONGAP  --   --   --   --   --  6  --  12  --  13  --  8  --  13   * 133* 156* 269*   < > 230*   < > 194*   < > 232*   < > 238*   < > 125*   BUN  --   --   --   --   --  55*  --  69*  --  68*  --  57*  --  45*   CR  --   --   --   --   --  1.17  --  1.38*  --  1.69*  --  1.90*  --  2.10*  2.10*   GFRESTIMATED  --   --   --   --   --  60*  --  49*  --  38*  --  33*  --  29*   EHSAN  --   --   --   --   --  9.0  --  8.8  --  8.9  --  8.5  --  9.1   PROTTOTAL  --   --   --   --   --   --   --   --   --   --   --  7.3  --  7.9   ALBUMIN  --   --   --   --   --   --   --   --   --   --   --  3.1*  --  3.8   BILITOTAL  --   --   --   --   --   --   --   --   --   --   --  0.4  --  0.8   ALKPHOS  --   --   --   --   --   --   --   --   --   --   --  93  --  87   AST  --   --   --   --   --   --   --   --   --   --   --  75*  --  108*   ALT  --   --   --   --   --   --   --   --   --   --   --  39  --  45    < > = values in this interval not displayed.     CBC  Recent Labs   Lab 10/12/21  0504 10/11/21  0215 10/10/21  0459 10/09/21  0521   WBC 5.7 5.5 8.0 5.7   RBC 4.65 4.66 4.65  4.91   HGB 14.9 15.0 15.1 16.0   HCT 42.7 43.0 43.2 46.4   MCV 92 92 93 95   MCH 32.0 32.2 32.5 32.6   MCHC 34.9 34.9 35.0 34.5   RDW 12.4 12.5 12.6 12.5    200 164 117*     INR  Recent Labs   Lab 10/08/21  1400   INR 1.13       Venous Blood Gas  Recent Labs   Lab 10/08/21  0442   PHV 7.34   PCO2V 43   PO2V 12*   HCO3V 23   MANJINDER -3.0   O2PER 30      [facility]

## 2021-11-03 ENCOUNTER — HOSPITAL ENCOUNTER (OUTPATIENT)
Dept: LAB | Age: 76
Discharge: HOME | End: 2021-11-03
Payer: MEDICARE

## 2021-11-03 DIAGNOSIS — I10 ESSENTIAL HYPERTENSION: ICD-10-CM

## 2021-11-03 LAB
ALBUMIN SERPL-MCNC: 3 G/DL (ref 3.4–5)
ALBUMIN/GLOB SERPL: 0.9 {RATIO} (ref 0.8–1.7)
ALP SERPL-CCNC: 83 U/L (ref 45–117)
ALT SERPL-CCNC: 14 U/L (ref 16–61)
ANION GAP SERPL CALC-SCNC: 3 MMOL/L (ref 3–18)
AST SERPL-CCNC: 16 U/L (ref 10–38)
BILIRUB SERPL-MCNC: 0.3 MG/DL (ref 0.2–1)
BUN SERPL-MCNC: 20 MG/DL (ref 7–18)
BUN/CREAT SERPL: 13 (ref 12–20)
CALCIUM SERPL-MCNC: 8.6 MG/DL (ref 8.5–10.1)
CHLORIDE SERPL-SCNC: 114 MMOL/L (ref 100–111)
CO2 SERPL-SCNC: 24 MMOL/L (ref 21–32)
CREAT SERPL-MCNC: 1.54 MG/DL (ref 0.6–1.3)
GLOBULIN SER CALC-MCNC: 3.2 G/DL (ref 2–4)
GLUCOSE SERPL-MCNC: 80 MG/DL (ref 74–99)
POTASSIUM SERPL-SCNC: 4.4 MMOL/L (ref 3.5–5.5)
PROT SERPL-MCNC: 6.2 G/DL (ref 6.4–8.2)
SODIUM SERPL-SCNC: 141 MMOL/L (ref 136–145)

## 2021-11-03 PROCEDURE — 80053 COMPREHEN METABOLIC PANEL: CPT

## 2021-11-03 PROCEDURE — 36415 COLL VENOUS BLD VENIPUNCTURE: CPT

## 2021-12-03 NOTE — PROGRESS NOTES
Informed pt she can continue with b12 drops per TB. Pt stated understanding. Still high, can you verify how many pills he has in the bottle from august ordered 90 day supply then?   If ~30 pills (if more could he be skipping dose?) , then will increase synthroid to 175 mcgs/day

## 2021-12-13 ENCOUNTER — OFFICE VISIT (OUTPATIENT)
Dept: FAMILY MEDICINE CLINIC | Age: 76
End: 2021-12-13
Payer: MEDICARE

## 2021-12-13 VITALS
HEART RATE: 63 BPM | WEIGHT: 153.4 LBS | RESPIRATION RATE: 16 BRPM | OXYGEN SATURATION: 98 % | BODY MASS INDEX: 24.65 KG/M2 | HEIGHT: 66 IN | DIASTOLIC BLOOD PRESSURE: 60 MMHG | TEMPERATURE: 96.9 F | SYSTOLIC BLOOD PRESSURE: 122 MMHG

## 2021-12-13 DIAGNOSIS — E03.9 ACQUIRED HYPOTHYROIDISM: ICD-10-CM

## 2021-12-13 DIAGNOSIS — N18.31 STAGE 3A CHRONIC KIDNEY DISEASE (HCC): ICD-10-CM

## 2021-12-13 DIAGNOSIS — N52.9 ERECTILE DYSFUNCTION, UNSPECIFIED ERECTILE DYSFUNCTION TYPE: ICD-10-CM

## 2021-12-13 DIAGNOSIS — Z00.00 MEDICARE ANNUAL WELLNESS VISIT, SUBSEQUENT: ICD-10-CM

## 2021-12-13 DIAGNOSIS — E78.00 PURE HYPERCHOLESTEROLEMIA: ICD-10-CM

## 2021-12-13 DIAGNOSIS — I10 ESSENTIAL HYPERTENSION: Primary | ICD-10-CM

## 2021-12-13 DIAGNOSIS — Z72.0 TOBACCO USE: ICD-10-CM

## 2021-12-13 PROCEDURE — G8754 DIAS BP LESS 90: HCPCS | Performed by: FAMILY MEDICINE

## 2021-12-13 PROCEDURE — 99214 OFFICE O/P EST MOD 30 MIN: CPT | Performed by: FAMILY MEDICINE

## 2021-12-13 PROCEDURE — G8510 SCR DEP NEG, NO PLAN REQD: HCPCS | Performed by: FAMILY MEDICINE

## 2021-12-13 PROCEDURE — G8752 SYS BP LESS 140: HCPCS | Performed by: FAMILY MEDICINE

## 2021-12-13 PROCEDURE — G0439 PPPS, SUBSEQ VISIT: HCPCS | Performed by: FAMILY MEDICINE

## 2021-12-13 PROCEDURE — G8427 DOCREV CUR MEDS BY ELIG CLIN: HCPCS | Performed by: FAMILY MEDICINE

## 2021-12-13 PROCEDURE — G8420 CALC BMI NORM PARAMETERS: HCPCS | Performed by: FAMILY MEDICINE

## 2021-12-13 PROCEDURE — G8536 NO DOC ELDER MAL SCRN: HCPCS | Performed by: FAMILY MEDICINE

## 2021-12-13 PROCEDURE — 1101F PT FALLS ASSESS-DOCD LE1/YR: CPT | Performed by: FAMILY MEDICINE

## 2021-12-13 PROCEDURE — G0463 HOSPITAL OUTPT CLINIC VISIT: HCPCS | Performed by: FAMILY MEDICINE

## 2021-12-13 RX ORDER — AMLODIPINE BESYLATE 10 MG/1
10 TABLET ORAL DAILY
Qty: 90 TABLET | Refills: 0 | Status: SHIPPED
Start: 2021-12-13 | End: 2021-12-13 | Stop reason: CLARIF

## 2021-12-13 NOTE — PATIENT INSTRUCTIONS
DASH Diet: Care Instructions  Your Care Instructions     The DASH diet is an eating plan that can help lower your blood pressure. DASH stands for Dietary Approaches to Stop Hypertension. Hypertension is high blood pressure. The DASH diet focuses on eating foods that are high in calcium, potassium, and magnesium. These nutrients can lower blood pressure. The foods that are highest in these nutrients are fruits, vegetables, low-fat dairy products, nuts, seeds, and legumes. But taking calcium, potassium, and magnesium supplements instead of eating foods that are high in those nutrients does not have the same effect. The DASH diet also includes whole grains, fish, and poultry. The DASH diet is one of several lifestyle changes your doctor may recommend to lower your high blood pressure. Your doctor may also want you to decrease the amount of sodium in your diet. Lowering sodium while following the DASH diet can lower blood pressure even further than just the DASH diet alone. Follow-up care is a key part of your treatment and safety. Be sure to make and go to all appointments, and call your doctor if you are having problems. It's also a good idea to know your test results and keep a list of the medicines you take. How can you care for yourself at home? Following the DASH diet  · Eat 4 to 5 servings of fruit each day. A serving is 1 medium-sized piece of fruit, ½ cup chopped or canned fruit, 1/4 cup dried fruit, or 4 ounces (½ cup) of fruit juice. Choose fruit more often than fruit juice. · Eat 4 to 5 servings of vegetables each day. A serving is 1 cup of lettuce or raw leafy vegetables, ½ cup of chopped or cooked vegetables, or 4 ounces (½ cup) of vegetable juice. Choose vegetables more often than vegetable juice. · Get 2 to 3 servings of low-fat and fat-free dairy each day. A serving is 8 ounces of milk, 1 cup of yogurt, or 1 ½ ounces of cheese. · Eat 6 to 8 servings of grains each day.  A serving is 1 slice of bread, 1 ounce of dry cereal, or ½ cup of cooked rice, pasta, or cooked cereal. Try to choose whole-grain products as much as possible. · Limit lean meat, poultry, and fish to 2 servings each day. A serving is 3 ounces, about the size of a deck of cards. · Eat 4 to 5 servings of nuts, seeds, and legumes (cooked dried beans, lentils, and split peas) each week. A serving is 1/3 cup of nuts, 2 tablespoons of seeds, or ½ cup of cooked beans or peas. · Limit fats and oils to 2 to 3 servings each day. A serving is 1 teaspoon of vegetable oil or 2 tablespoons of salad dressing. · Limit sweets and added sugars to 5 servings or less a week. A serving is 1 tablespoon jelly or jam, ½ cup sorbet, or 1 cup of lemonade. · Eat less than 2,300 milligrams (mg) of sodium a day. If you limit your sodium to 1,500 mg a day, you can lower your blood pressure even more. · Be aware that all of these are the suggested number of servings for people who eat 1,800 to 2,000 calories a day. Your recommended number of servings may be different if you need more or fewer calories. Tips for success  · Start small. Do not try to make dramatic changes to your diet all at once. You might feel that you are missing out on your favorite foods and then be more likely to not follow the plan. Make small changes, and stick with them. Once those changes become habit, add a few more changes. · Try some of the following:  ? Make it a goal to eat a fruit or vegetable at every meal and at snacks. This will make it easy to get the recommended amount of fruits and vegetables each day. ? Try yogurt topped with fruit and nuts for a snack or healthy dessert. ? Add lettuce, tomato, cucumber, and onion to sandwiches. ? Combine a ready-made pizza crust with low-fat mozzarella cheese and lots of vegetable toppings. Try using tomatoes, squash, spinach, broccoli, carrots, cauliflower, and onions. ?  Have a variety of cut-up vegetables with a low-fat dip as an appetizer instead of chips and dip. ? Sprinkle sunflower seeds or chopped almonds over salads. Or try adding chopped walnuts or almonds to cooked vegetables. ? Try some vegetarian meals using beans and peas. Add garbanzo or kidney beans to salads. Make burritos and tacos with mashed rodriguez beans or black beans. Where can you learn more? Go to http://www.[employer].com/  Enter H967 in the search box to learn more about \"DASH Diet: Care Instructions. \"  Current as of: April 29, 2021               Content Version: 13.0  © 9320-8120 [employer]. Care instructions adapted under license by [employer] (which disclaims liability or warranty for this information). If you have questions about a medical condition or this instruction, always ask your healthcare professional. [address] any warranty or liability for your use of this information.

## 2021-12-13 NOTE — PROGRESS NOTES
1. Have you been to the ER, urgent care clinic since your last visit? Hospitalized since your last visit? No    2. Have you seen or consulted any other health care providers outside of the [address] [place] [address] since your last visit? Include any pap smears or colon screening.  Dr. Adilia Partida LOV: 1 month ago    Chief Complaint   Patient presents with    Hypertension     wants to restart original BP medication    Thyroid Problem    Cholesterol Problem    Erectile Dysfunction    Chronic Kidney Disease

## 2021-12-13 NOTE — PROGRESS NOTES
Jeffrey Floor, 68 y.o.,  male    SUBJECTIVE  Ff-up      HTN- Continues to take diovan 80 mg, felodipine. hydralazine dose to 50 mg tid and coreg 25 mg bid. Pt with h/o CKD. He has recurrent rash now seeing dr. Ruthann Velásquez whom he reports does not think reaction to any of previous antihypertensives norvasc or lisinopril. He is considering going back to previous medications depending on insurance drug coverage    HL-on mevacor    He is a chronic smoker, about 5 cig/ day. aaa screen neg. Old granulomatous disease on repeat CXR, however did not pursue CT chest- advised to get done to r/o malignancy (provided central scheduling # provided)    Hypothyroidism-reports  Compliant with 150 mcgs synthroid/day. Reviewed normal TSH 2/2021    Remote h/o throat ca s/p radiation/chemotherapy 2003    ED- on prn viagra  ROS:  See HPI, all others negative        Patient Active Problem List   Diagnosis Code    Essential hypertension I10    Acquired hypothyroidism E03.9    Tobacco use Z72.0    Stage 3a chronic kidney disease ([facility] [address] N18.31    Abnormal stress test R94.39    [facility] I42.9       Current Outpatient Medications   Medication Sig Dispense Refill    valsartan (DIOVAN) 80 mg tablet Take 1 Tablet by mouth daily. 90 Tablet 1    carvediloL (Coreg) 25 mg tablet Take 1 Tablet by mouth two (2) times daily (with meals). 180 Tablet 0    felodipine (PLENDIL SR) 5 mg 24 hr tablet Take 1 Tablet by mouth daily. 90 Tablet 1    clobetasoL (TEMOVATE) 0.05 % ointment Apply  to affected area two (2) times a day. 30 g 0    sildenafil citrate (Viagra) 50 mg tablet Take 1 Tab by mouth as needed for Erectile Dysfunction. 12 Tab 5    levothyroxine (SYNTHROID) 150 mcg tablet Take 1 Tab by mouth Daily (before breakfast). 90 Tab 3    hydrALAZINE (APRESOLINE) 50 mg tablet Take 1 Tab by mouth three (3) times daily. 270 Tab 3    lovastatin (MEVACOR) 20 mg tablet Take 2 Tabs by mouth nightly.  180 Tab 3       Allergies   Allergen Reactions    Lisinopril Rash    Norvasc [Amlodipine] Itching       Past Medical History:   Diagnosis Date    Cancer ([facility] [address] 2012    throat    Gout     Hx of colonoscopy with polypectomy 11/15/2016    rpt 5 years 2021, dr. Kirk Montgomery    Hypertension     Pancreatitis 1989    Thyroid disease        Social History     Socioeconomic History    Marital status:      Spouse name: Not on file    Number of children: Not on file    Years of education: Not on file    Highest education level: Not on file   Occupational History    Not on file   Tobacco Use    Smoking status: Current Every Day Smoker     Packs/day: 0.25     Years: 50.00     Pack years: 12.50    Smokeless tobacco: Never Used   Substance and Sexual Activity    Alcohol use: Yes     Comment: 3    Drug use: No    Sexual activity: Yes     Partners: Female   Other Topics Concern    Not on file   Social History Narrative    Not on file     Social Determinants of Health     Financial Resource Strain:     Difficulty of Paying Living Expenses: Not on file   Food Insecurity:     Worried About Running Out of Food in the Last Year: Not on file    Janel of Food in the Last Year: Not on file   Transportation Needs:     Lack of Transportation (Medical): Not on file    Lack of Transportation (Non-Medical):  Not on file   Physical Activity:     Days of Exercise per Week: Not on file    Minutes of Exercise per Session: Not on file   Stress:     Feeling of Stress : Not on file   Social Connections:     Frequency of Communication with Friends and Family: Not on file    Frequency of Social Gatherings with Friends and Family: Not on file    Attends Christian Services: Not on file    Active Member of Clubs or Organizations: Not on file    Attends Club or Organization Meetings: Not on file    Marital Status: Not on file   Intimate Partner Violence:     Fear of Current or Ex-Partner: Not on file    Emotionally Abused: Not on file    Physically Abused: Not on file    Sexually Abused: Not on file   Housing Stability:     Unable to Pay for Housing in the Last Year: Not on file    Number of Places Lived in the Last Year: Not on file    Unstable Housing in the Last Year: Not on file       Family History   Problem Relation Age of Onset    Cancer Father          OBJECTIVE    Physical Exam:     Visit Vitals  /60 (BP 1 Location: Left upper arm, BP Patient Position: Sitting, BP Cuff Size: Adult)   Pulse 63   Temp 96.9 °F (36.1 °C) (Temporal)   Resp 16   Ht 5' 6\" (1.676 m)   Wt 153 lb 6.4 oz (69.6 kg)   SpO2 98%   BMI 24.76 kg/m²       General: alert, elderly, AA, in no apparent distress or pain  Head: atraumatic. Non-tender maxillary and frontal sinuses  Neck: supple, no adenopathy palpated  CVS: normal rate, regular rhythm, distinct S1 and S2  Lungs:clear to ausculation bilaterally, no crackles, wheezing or rhonchi noted  Abdomen: normoactive bowel sounds, soft, non-tender  Extremities: no edema, no cyanosis, MSK grossly normal  Skin: warm, no lesions, rashes noted  Psych:  mood and affect normal  Results for orders placed or performed during the hospital encounter of 03/97/89   METABOLIC PANEL, COMPREHENSIVE   Result Value Ref Range    Sodium 141 136 - 145 mmol/L    Potassium 4.4 3.5 - 5.5 mmol/L    Chloride 114 (H) 100 - 111 mmol/L    CO2 24 21 - 32 mmol/L    Anion gap 3 3.0 - 18 mmol/L    Glucose 80 74 - 99 mg/dL    BUN 20 (H) 7.0 - 18 MG/DL    Creatinine 1.54 (H) 0.6 - 1.3 MG/DL    BUN/Creatinine ratio 13 12 - 20      GFR est AA 54 (L) >60 ml/min/1.73m2    GFR est non-AA 44 (L) >60 ml/min/1.73m2    Calcium 8.6 8.5 - 10.1 MG/DL    Bilirubin, total 0.3 0.2 - 1.0 MG/DL    ALT (SGPT) 14 (L) 16 - 61 U/L    AST (SGOT) 16 10 - 38 U/L    Alk.  phosphatase 83 45 - 117 U/L    Protein, total 6.2 (L) 6.4 - 8.2 g/dL    Albumin 3.0 (L) 3.4 - 5.0 g/dL    Globulin 3.2 2.0 - 4.0 g/dL    A-G Ratio 0.9 0.8 - 1.7             ASSESSMENT/PLAN  Diagnoses and all orders for this visit: 1. Essential hypertension   controlled  cont felodipine 5 mg  Cont coreg 25 mg bid  Cont hydralazine 50 mg tid  Cont diovan- reminded on repeat BMP (ordered by cards) to follow renal function  ? allergy to norvasc/lisinopril, per derm dr. Tiffanie Fontaine not a true allergy (will request records)  - we discussed consideration of switching back to ccb/ace depending on cost. We have agreed since BP is under good control currently, will cont with regimen. Cont  mevacor  Cmp/lipid panel prior to next visit  DASH Diet    2. Acquired hypothyroidism  Stable  Cont 150 mcgs synthroid/day  TSH 2/2021 normal  tsh prior to next visit     3. Tobacco use  Discussed complete cessation    4. Stage 3a chronic kidney disease  Monitoring    5. Chest pain, unspecified type  Atypical  Stress test/echo 2/2021 normal    6. Abnormal CXR  Old granuloma, advised to pursue Chest Ct with smoking h/o    7. Pure hypercholesterolemia  Cont mevacor    8. Erectile dysfunction, unspecified  Cont prn viagra    Follow-up and Dispositions    · Return in about 3 months (around 3/13/2022), or if symptoms worsen or fail to improve, for fasting labs a week prior to your next visit, routine chronic illness care. Patient understands plan of care. Patient has provided input and agrees with goals. This is the Subsequent Medicare Annual Wellness Exam, performed 12 months or more after the Initial AWV or the last Subsequent AWV    I have reviewed the patient's medical history in detail and updated the computerized patient record.      History     Past Medical History:   Diagnosis Date    Cancer [facility]) 2012    throat    Gout     Hx of colonoscopy with polypectomy 11/15/2016    rpt 5 years 2021, dr. Demian Valencia    Hypertension     Pancreatitis 1989    Thyroid disease       Past Surgical History:   Procedure Laterality Date    ABDOMEN SURGERY PROC UNLISTED      HX COLONOSCOPY  11/15/2016    repeat 5 years 2021, Dr Ruddy Flores polyp     Current Outpatient Medications:     valsartan (DIOVAN) 80 mg tablet, Take 1 Tablet by mouth daily. , Disp: 90 Tablet, Rfl: 1    carvediloL (Coreg) 25 mg tablet, Take 1 Tablet by mouth two (2) times daily (with meals). , Disp: 180 Tablet, Rfl: 0    felodipine (PLENDIL SR) 5 mg 24 hr tablet, Take 1 Tablet by mouth daily. , Disp: 90 Tablet, Rfl: 1    clobetasoL (TEMOVATE) 0.05 % ointment, Apply  to affected area two (2) times a day., Disp: 30 g, Rfl: 0    sildenafil citrate (Viagra) 50 mg tablet, Take 1 Tab by mouth as needed for Erectile Dysfunction. , Disp: 12 Tab, Rfl: 5    levothyroxine (SYNTHROID) 150 mcg tablet, Take 1 Tab by mouth Daily (before breakfast). , Disp: 90 Tab, Rfl: 3    hydrALAZINE (APRESOLINE) 50 mg tablet, Take 1 Tab by mouth three (3) times daily. , Disp: 270 Tab, Rfl: 3    lovastatin (MEVACOR) 20 mg tablet, Take 2 Tabs by mouth nightly., Disp: 180 Tab, Rfl: 3      No Known Allergies  Family History   Problem Relation Age of Onset    Cancer Father      Social History     Tobacco Use    Smoking status: Current Every Day Smoker     Packs/day: 0.25     Years: 50.00     Pack years: 12.50    Smokeless tobacco: Never Used   Substance Use Topics    Alcohol use: Yes     Comment: 3     Patient Active Problem List   Diagnosis Code    Essential hypertension I10    Acquired hypothyroidism E03.9    Tobacco use Z72.0    CKD (chronic kidney disease) stage 3, GFR 30-59 ml/min N18.30       Depression Risk Factor Screening:     3 most recent PHQ Screens 4/9/2020   Little interest or pleasure in doing things Not at all   Feeling down, depressed, irritable, or hopeless Not at all   Total Score PHQ 2 0     Alcohol Risk Factor Screening: You do not drink alcohol or very rarely. Functional Ability and Level of Safety:   Hearing Loss  Hearing is good. Activities of Daily Living  The home contains: no safety equipment.   Patient does total self care    Fall Risk  Fall Risk Assessment, last 12 mths 4/9/2020   Able to walk? Yes   Fall in past 12 months?  No       Abuse Screen  Patient is not abused    Cognitive Screening   Evaluation of Cognitive Function:  Has your family/caregiver stated any concerns about your memory: no      Patient Care Team   Patient Care Team:  Fabiana Thibodeaux MD as PCP - General (Family Medicine)  Fabiana Thibodeaux MD as PCP - [facility]  Endy Keith MD (Gastroenterology)  DEBRA Burgess (Optometry)  Stephanie Wilhelm DO (Dermatology)    Assessment/Plan   Education and counseling provided:  Are appropriate based on today's review and evaluation  Pneumococcal Vaccine- 13 and 23 completed  Influenza Vaccine- annually, advised to get in local pharmacy, pt declines   Cardiovascular screening blood test- due order placed  Screening for glaucoma-due, pt reminded        Health Maintenance Due   Topic Date Due    DTaP/Tdap/Td series (1 - Tdap) 11/08/1966    Shingrix Vaccine Age 50> (1 of 2) 11/08/1995    GLAUCOMA SCREENING Q2Y  04/23/2020    Medicare Yearly Exam  05/03/2020    Flu Vaccine (1) 09/01/2020    Lipid Screen  09/18/2020

## 2022-01-10 RX ORDER — FELODIPINE 5 MG/1
5 TABLET, EXTENDED RELEASE ORAL DAILY
Qty: 90 TABLET | Refills: 1 | Status: SHIPPED | OUTPATIENT
Start: 2022-01-10 | End: 2022-07-11 | Stop reason: SDUPTHER

## 2022-01-10 NOTE — TELEPHONE ENCOUNTER
This patient contacted office for the following prescriptions to be filled:    Last office visit: 12/13/2021  Follow up appointment: 3/14/2022   Medication requested :   Requested Prescriptions     Pending Prescriptions Disp Refills    felodipine (PLENDIL SR) 5 mg 24 hr tablet 90 Tablet 1     Sig: Take 1 Tablet by mouth daily.      PCP: Myra ruffin or Local pharmacy name [facility] [phone]

## 2022-01-13 ENCOUNTER — OFFICE VISIT (OUTPATIENT)
Dept: CARDIOLOGY CLINIC | Age: 77
End: 2022-01-13
Payer: MEDICARE

## 2022-01-13 VITALS
WEIGHT: 154 LBS | SYSTOLIC BLOOD PRESSURE: 137 MMHG | DIASTOLIC BLOOD PRESSURE: 60 MMHG | BODY MASS INDEX: 24.75 KG/M2 | HEIGHT: 66 IN | OXYGEN SATURATION: 98 % | HEART RATE: 54 BPM

## 2022-01-13 DIAGNOSIS — N18.31 STAGE 3A CHRONIC KIDNEY DISEASE (HCC): ICD-10-CM

## 2022-01-13 DIAGNOSIS — I42.9 CARDIOMYOPATHY, UNSPECIFIED TYPE (HCC): ICD-10-CM

## 2022-01-13 DIAGNOSIS — I10 ESSENTIAL HYPERTENSION: Primary | ICD-10-CM

## 2022-01-13 DIAGNOSIS — E78.5 HYPERLIPIDEMIA, UNSPECIFIED HYPERLIPIDEMIA TYPE: ICD-10-CM

## 2022-01-13 PROCEDURE — G8420 CALC BMI NORM PARAMETERS: HCPCS | Performed by: INTERNAL MEDICINE

## 2022-01-13 PROCEDURE — 99214 OFFICE O/P EST MOD 30 MIN: CPT | Performed by: INTERNAL MEDICINE

## 2022-01-13 PROCEDURE — G8536 NO DOC ELDER MAL SCRN: HCPCS | Performed by: INTERNAL MEDICINE

## 2022-01-13 PROCEDURE — G8432 DEP SCR NOT DOC, RNG: HCPCS | Performed by: INTERNAL MEDICINE

## 2022-01-13 PROCEDURE — G8427 DOCREV CUR MEDS BY ELIG CLIN: HCPCS | Performed by: INTERNAL MEDICINE

## 2022-01-13 PROCEDURE — 1101F PT FALLS ASSESS-DOCD LE1/YR: CPT | Performed by: INTERNAL MEDICINE

## 2022-01-13 PROCEDURE — G8754 DIAS BP LESS 90: HCPCS | Performed by: INTERNAL MEDICINE

## 2022-01-13 PROCEDURE — G8752 SYS BP LESS 140: HCPCS | Performed by: INTERNAL MEDICINE

## 2022-01-13 NOTE — PROGRESS NOTES
1. Have you been to the ER, urgent care clinic since your last visit? Hospitalized since your last visit? No    2. Have you seen or consulted any other health care providers outside of the [address] [place] [address] since your last visit? Include any pap smears or colon screening.  No

## 2022-01-13 NOTE — PROGRESS NOTES
HISTORY OF PRESENT ILLNESS  Glenn Mayfield is a 68 y.o. male. 2/12/2021  Patient seen today for new patient evaluation. He is referred here for evaluation of chest pain. Patient was in emergency room May 2020 with episodes of chest pain. Patient sharp pain in the chest at that time. Since that time he has not had any recurrence of chest pain. He denies any shortness of breath, dizziness palpitation or other symptom. Patient subsequent stress test in 12/2020 that was reported abnormal due to low ejection fraction. It was thought to be under calculation possibly from LVH. Perfusion was reported normal.  3/16/2021  Patient presents to f/u for HTN and echo results. He reports Coreg recently increased to 25 mg /BID by PCP, and b/p remains elevated. Review of Systems   Constitutional: Negative for chills and fever. HENT: Negative for nosebleeds. Eyes: Negative for blurred vision and double vision. Respiratory: Negative for cough, hemoptysis, sputum production, shortness of breath and wheezing. Cardiovascular: Negative for chest pain, palpitations, orthopnea, claudication, leg swelling and PND. Gastrointestinal: Negative for abdominal pain, heartburn, nausea and vomiting. Musculoskeletal: Negative for myalgias. Skin: Negative for rash. Neurological: Negative for dizziness, weakness and headaches. Endo/Heme/Allergies: Does not bruise/bleed easily.      Family History   Problem Relation Age of Onset    Cancer Father        Past Medical History:   Diagnosis Date    Cancer [facility]) 2012    throat    Gout     Hx of colonoscopy with polypectomy 11/15/2016    rpt 5 years 2021, dr. Chelle Madrigal    Hypertension     Pancreatitis 1989    Thyroid disease        Past Surgical History:   Procedure Laterality Date    HX COLONOSCOPY  11/15/2016    repeat 5 years 2021, Dr Paulo Del Angel         Social History     Tobacco Use    Smoking status: Current Every Day Smoker     Packs/day: 0.25     Years: 50.00     Pack years: 12.50    Smokeless tobacco: Never Used   Substance Use Topics    Alcohol use: Yes     Comment: 3       Allergies   Allergen Reactions    Lisinopril Rash    Norvasc [Amlodipine] Itching       Prior to Admission medications    Medication Sig Start Date End Date Taking? Authorizing Provider   felodipine (PLENDIL SR) 5 mg 24 hr tablet Take 1 Tablet by mouth daily. 1/10/22  Yes Heriberto Sharp MD   valsartan (DIOVAN) 80 mg tablet Take 1 Tablet by mouth daily. 9/13/21  Yes Heriberto Sharp MD   carvediloL (Coreg) 25 mg tablet Take 1 Tablet by mouth two (2) times daily (with meals). 7/20/21  Yes Lencho Canada MD   clobetasoL (TEMOVATE) 0.05 % ointment Apply  to affected area two (2) times a day. 5/10/21  Yes Heriberto Sharp MD   sildenafil citrate (Viagra) 50 mg tablet Take 1 Tab by mouth as needed for Erectile Dysfunction. 3/11/21  Yes Heriberto Sharp MD   levothyroxine (SYNTHROID) 150 mcg tablet Take 1 Tab by mouth Daily (before breakfast). 2/23/21  Yes Lencho Canada MD   hydrALAZINE (APRESOLINE) 50 mg tablet Take 1 Tab by mouth three (3) times daily. 2/12/21  Yes Ayde Pfeiffer MD   lovastatin (MEVACOR) 20 mg tablet Take 2 Tabs by mouth nightly. 2/11/21  Yes Heriberto Sharp MD         Visit Vitals  /60 (BP 1 Location: Left upper arm, BP Patient Position: Sitting, BP Cuff Size: Adult)   Pulse (!) 54   Ht 5' 6\" (1.676 m)   Wt 69.9 kg (154 lb)   SpO2 98%   BMI 24.86 kg/m²         Physical Exam  Vitals and nursing note reviewed. Constitutional:       Appearance: He is well-developed. HENT:      Head: Normocephalic and atraumatic. Eyes:      Conjunctiva/sclera: Conjunctivae normal.   Neck:      Thyroid: No thyromegaly. Vascular: No JVD. Trachea: No tracheal deviation. Cardiovascular:      Rate and Rhythm: Normal rate and regular rhythm. Heart sounds: Murmur heard. No friction rub. No gallop.     Pulmonary: Effort: No respiratory distress. Breath sounds: Normal breath sounds. No wheezing or rales. Chest:      Chest wall: No tenderness. Abdominal:      Palpations: Abdomen is soft. Tenderness: There is no abdominal tenderness. Musculoskeletal:      Cervical back: Neck supple. Skin:     General: Skin is warm and dry. Neurological:      Mental Status: He is alert and oriented to person, place, and time. Mr. Ryan Edwards has a reminder for a \"due or due soon\" health maintenance. I have asked that he contact his primary care provider for follow-up on this health maintenance. No flowsheet data found. I have personally reviewed patient's records available from hospital and other providers and incorporated findings in patient care. Notes, lab, EKG, stress test, chest x-ray  Procedure Conclusion 12/2020    Nuclear Stress Test    Nuclear Cardiac Spect Rest then Gated Stress study. Mica Fisherman was used as the stressing method and agent. (Mica Fisherman given via a 10 - 20 sec injection.)   One day myocardial perfusion study. Date: 12/9/2020. Left ventricular perfusion is normal. Myocardial perfusion imaging supports an intermediate risk stress test.   There is no prior study available for comparison. .   Interpretation Summary 12/2020    · Baseline ECG: Sinus bradycardia. LVH with repolorization; possible left atrial enlargement  · Gated SPECT: Left ventricular function post-stress was abnormal. Calculated ejection fraction is 47%. There is no evidence of transient ischemic dilation (TID). The TID ratio is 1.17. · Left ventricular perfusion is normal, no ischemia or previous infarct. · Myocardial perfusion imaging supports an intermediate risk stress test due to calculated EF of 47%. Significant LVH present, possibly leading to underestimation of EF. Clinical correlation recommended. Echo 3/4/2021  Interpretation Summary  · LV: Estimated LVEF is 60 - 65%.  Normal cavity size and systolic function (ejection fraction normal). Severe apical hypertrophy. Moderate septal wall hypertrophy. Moderate posterior wall hypertrophy. Wall motion: normal. Moderate (grade 2) left ventricular diastolic dysfunction. · LA: Left Atrium volume index is 30.05 mL/m2. · RV: Normal right ventricular size and function. · MV: Mild mitral valve regurgitation is present. IMPRESSION chest x-ray 12/2020    No acute pulmonic disease. Probable old granulomatous disease. Assessment         ICD-10-CM ICD-9-CM    1. Essential hypertension  I10 401.9     Stable continue treatment   2. Cardiomyopathy, unspecified type ([address]  I42.9 425.4     Stable continue treatment monitor   3. Stage 3a chronic kidney disease (HCC)  N18.31 585. 3     Continue monitoring follow-up with PCP. Labs 1/11/2021 reviewed. 4. Hyperlipidemia, unspecified hyperlipidemia type  E78.5 272.4     Continue treatment lab with PCP     2/2021  Seen for low ejection fraction reported on nuclear scan. History of severe hypertension uncontrolled. Increase hydralazine and Coreg. Has side effect with lisinopril and amlodipine which are off. Check echo for LV function. Continue with other therapy  3/2021  Echo revealed EF 60-65% - with severe apical hypertrophy likely due to hypertensive heart disease. Blood pressure is uncontrolled. Will begin Valsartan 80 mg/ Day   Check BMP in 1 week and follow home chart if able. Encouraged smoking cessation   6/2021  Cardiac status stable. Hypertension much better controlled. Renal function stable continue current medical management  1/2022  Cardiac status stable. Blood pressure controlled. Labs reviewed continue treatment  There are no discontinued medications. No orders of the defined types were placed in this encounter. Follow-up and Dispositions    · Return in about 6 months (around 7/13/2022).          Jazmine Kearney MD

## 2022-02-10 NOTE — TELEPHONE ENCOUNTER
This patient contacted office for the following prescriptions to be filled:    Last office visit: 12/13/2021  Follow up appointment: 3/14/2022  Medication requested :   Requested Prescriptions     Pending Prescriptions Disp Refills    carvediloL (Coreg) 25 mg tablet 180 Tablet 0     Sig: Take 1 Tablet by mouth two (2) times daily (with meals).      PCP: Jayson ruffin or Local pharmacy name [phone]

## 2022-02-11 RX ORDER — CARVEDILOL 25 MG/1
25 TABLET ORAL 2 TIMES DAILY WITH MEALS
Qty: 180 TABLET | Refills: 1 | Status: SHIPPED | OUTPATIENT
Start: 2022-02-11

## 2022-03-07 ENCOUNTER — HOSPITAL ENCOUNTER (OUTPATIENT)
Dept: LAB | Age: 77
Discharge: HOME | End: 2022-03-07
Payer: MEDICARE

## 2022-03-07 DIAGNOSIS — E03.9 ACQUIRED HYPOTHYROIDISM: ICD-10-CM

## 2022-03-07 DIAGNOSIS — I10 ESSENTIAL HYPERTENSION: ICD-10-CM

## 2022-03-07 LAB
ALBUMIN SERPL-MCNC: 3.1 G/DL (ref 3.4–5)
ALBUMIN/GLOB SERPL: 0.9 {RATIO} (ref 0.8–1.7)
ALP SERPL-CCNC: 88 U/L (ref 45–117)
ALT SERPL-CCNC: 14 U/L (ref 16–61)
ANION GAP SERPL CALC-SCNC: 6 MMOL/L (ref 3–18)
AST SERPL-CCNC: 12 U/L (ref 10–38)
BILIRUB SERPL-MCNC: 0.4 MG/DL (ref 0.2–1)
BUN SERPL-MCNC: 19 MG/DL (ref 7–18)
BUN/CREAT SERPL: 13 (ref 12–20)
CALCIUM SERPL-MCNC: 8.8 MG/DL (ref 8.5–10.1)
CHLORIDE SERPL-SCNC: 113 MMOL/L (ref 100–111)
CHOLEST SERPL-MCNC: 179 MG/DL
CO2 SERPL-SCNC: 24 MMOL/L (ref 21–32)
CREAT SERPL-MCNC: 1.44 MG/DL (ref 0.6–1.3)
GLOBULIN SER CALC-MCNC: 3.5 G/DL (ref 2–4)
GLUCOSE SERPL-MCNC: 76 MG/DL (ref 74–99)
HDLC SERPL-MCNC: 65 MG/DL (ref 40–60)
HDLC SERPL: 2.8 {RATIO} (ref 0–5)
LDLC SERPL CALC-MCNC: 93 MG/DL (ref 0–100)
LIPID PROFILE,FLP: ABNORMAL
POTASSIUM SERPL-SCNC: 4 MMOL/L (ref 3.5–5.5)
PROT SERPL-MCNC: 6.6 G/DL (ref 6.4–8.2)
SODIUM SERPL-SCNC: 143 MMOL/L (ref 136–145)
TRIGL SERPL-MCNC: 105 MG/DL (ref ?–150)
TSH SERPL DL<=0.05 MIU/L-ACNC: 1.78 UIU/ML (ref 0.36–3.74)
VLDLC SERPL CALC-MCNC: 21 MG/DL

## 2022-03-07 PROCEDURE — 84443 ASSAY THYROID STIM HORMONE: CPT

## 2022-03-07 PROCEDURE — 36415 COLL VENOUS BLD VENIPUNCTURE: CPT

## 2022-03-07 PROCEDURE — 80061 LIPID PANEL: CPT

## 2022-03-07 PROCEDURE — 80053 COMPREHEN METABOLIC PANEL: CPT

## 2022-03-14 ENCOUNTER — OFFICE VISIT (OUTPATIENT)
Dept: FAMILY MEDICINE CLINIC | Age: 77
End: 2022-03-14
Payer: MEDICARE

## 2022-03-14 VITALS
TEMPERATURE: 97 F | HEIGHT: 66 IN | HEART RATE: 68 BPM | OXYGEN SATURATION: 99 % | DIASTOLIC BLOOD PRESSURE: 72 MMHG | WEIGHT: 155 LBS | BODY MASS INDEX: 24.91 KG/M2 | SYSTOLIC BLOOD PRESSURE: 132 MMHG | RESPIRATION RATE: 16 BRPM

## 2022-03-14 DIAGNOSIS — J84.10 LUNG GRANULOMA (HCC): ICD-10-CM

## 2022-03-14 DIAGNOSIS — Z72.0 TOBACCO USE: ICD-10-CM

## 2022-03-14 DIAGNOSIS — R93.89 ABNORMAL CXR: ICD-10-CM

## 2022-03-14 DIAGNOSIS — E78.00 PURE HYPERCHOLESTEROLEMIA: ICD-10-CM

## 2022-03-14 DIAGNOSIS — E03.9 ACQUIRED HYPOTHYROIDISM: ICD-10-CM

## 2022-03-14 DIAGNOSIS — I10 ESSENTIAL HYPERTENSION: Primary | ICD-10-CM

## 2022-03-14 DIAGNOSIS — N18.31 STAGE 3A CHRONIC KIDNEY DISEASE (HCC): ICD-10-CM

## 2022-03-14 PROCEDURE — G8419 CALC BMI OUT NRM PARAM NOF/U: HCPCS | Performed by: FAMILY MEDICINE

## 2022-03-14 PROCEDURE — G8752 SYS BP LESS 140: HCPCS | Performed by: FAMILY MEDICINE

## 2022-03-14 PROCEDURE — G0463 HOSPITAL OUTPT CLINIC VISIT: HCPCS | Performed by: FAMILY MEDICINE

## 2022-03-14 PROCEDURE — 99214 OFFICE O/P EST MOD 30 MIN: CPT | Performed by: FAMILY MEDICINE

## 2022-03-14 PROCEDURE — G8754 DIAS BP LESS 90: HCPCS | Performed by: FAMILY MEDICINE

## 2022-03-14 PROCEDURE — G8536 NO DOC ELDER MAL SCRN: HCPCS | Performed by: FAMILY MEDICINE

## 2022-03-14 PROCEDURE — G8510 SCR DEP NEG, NO PLAN REQD: HCPCS | Performed by: FAMILY MEDICINE

## 2022-03-14 PROCEDURE — 1101F PT FALLS ASSESS-DOCD LE1/YR: CPT | Performed by: FAMILY MEDICINE

## 2022-03-14 PROCEDURE — G8427 DOCREV CUR MEDS BY ELIG CLIN: HCPCS | Performed by: FAMILY MEDICINE

## 2022-03-14 RX ORDER — VALSARTAN 80 MG/1
80 TABLET ORAL DAILY
Qty: 90 TABLET | Refills: 1 | Status: SHIPPED | OUTPATIENT
Start: 2022-03-14 | End: 2022-09-19

## 2022-03-14 RX ORDER — SILDENAFIL 50 MG/1
50 TABLET, FILM COATED ORAL AS NEEDED
Qty: 12 TABLET | Refills: 5 | Status: SHIPPED | OUTPATIENT
Start: 2022-03-14

## 2022-03-14 NOTE — PROGRESS NOTES
1. Have you been to the ER, urgent care clinic since your last visit? Hospitalized since your last visit? No    2. Have you seen or consulted any other health care providers outside of the [address] [place] [address] since your last visit? Include any pap smears or colon screening.  No

## 2022-03-14 NOTE — PATIENT INSTRUCTIONS
DASH Diet: Care Instructions  Your Care Instructions     The DASH diet is an eating plan that can help lower your blood pressure. DASH stands for Dietary Approaches to Stop Hypertension. Hypertension is high blood pressure. The DASH diet focuses on eating foods that are high in calcium, potassium, and magnesium. These nutrients can lower blood pressure. The foods that are highest in these nutrients are fruits, vegetables, low-fat dairy products, nuts, seeds, and legumes. But taking calcium, potassium, and magnesium supplements instead of eating foods that are high in those nutrients does not have the same effect. The DASH diet also includes whole grains, fish, and poultry. The DASH diet is one of several lifestyle changes your doctor may recommend to lower your high blood pressure. Your doctor may also want you to decrease the amount of sodium in your diet. Lowering sodium while following the DASH diet can lower blood pressure even further than just the DASH diet alone. Follow-up care is a key part of your treatment and safety. Be sure to make and go to all appointments, and call your doctor if you are having problems. It's also a good idea to know your test results and keep a list of the medicines you take. How can you care for yourself at home? Following the DASH diet  · Eat 4 to 5 servings of fruit each day. A serving is 1 medium-sized piece of fruit, ½ cup chopped or canned fruit, 1/4 cup dried fruit, or 4 ounces (½ cup) of fruit juice. Choose fruit more often than fruit juice. · Eat 4 to 5 servings of vegetables each day. A serving is 1 cup of lettuce or raw leafy vegetables, ½ cup of chopped or cooked vegetables, or 4 ounces (½ cup) of vegetable juice. Choose vegetables more often than vegetable juice. · Get 2 to 3 servings of low-fat and fat-free dairy each day. A serving is 8 ounces of milk, 1 cup of yogurt, or 1 ½ ounces of cheese. · Eat 6 to 8 servings of grains each day.  A serving is 1 slice of bread, 1 ounce of dry cereal, or ½ cup of cooked rice, pasta, or cooked cereal. Try to choose whole-grain products as much as possible. · Limit lean meat, poultry, and fish to 2 servings each day. A serving is 3 ounces, about the size of a deck of cards. · Eat 4 to 5 servings of nuts, seeds, and legumes (cooked dried beans, lentils, and split peas) each week. A serving is 1/3 cup of nuts, 2 tablespoons of seeds, or ½ cup of cooked beans or peas. · Limit fats and oils to 2 to 3 servings each day. A serving is 1 teaspoon of vegetable oil or 2 tablespoons of salad dressing. · Limit sweets and added sugars to 5 servings or less a week. A serving is 1 tablespoon jelly or jam, ½ cup sorbet, or 1 cup of lemonade. · Eat less than 2,300 milligrams (mg) of sodium a day. If you limit your sodium to 1,500 mg a day, you can lower your blood pressure even more. · Be aware that all of these are the suggested number of servings for people who eat 1,800 to 2,000 calories a day. Your recommended number of servings may be different if you need more or fewer calories. Tips for success  · Start small. Do not try to make dramatic changes to your diet all at once. You might feel that you are missing out on your favorite foods and then be more likely to not follow the plan. Make small changes, and stick with them. Once those changes become habit, add a few more changes. · Try some of the following:  ? Make it a goal to eat a fruit or vegetable at every meal and at snacks. This will make it easy to get the recommended amount of fruits and vegetables each day. ? Try yogurt topped with fruit and nuts for a snack or healthy dessert. ? Add lettuce, tomato, cucumber, and onion to sandwiches. ? Combine a ready-made pizza crust with low-fat mozzarella cheese and lots of vegetable toppings. Try using tomatoes, squash, spinach, broccoli, carrots, cauliflower, and onions. ?  Have a variety of cut-up vegetables with a low-fat dip as an appetizer instead of chips and dip. ? Sprinkle sunflower seeds or chopped almonds over salads. Or try adding chopped walnuts or almonds to cooked vegetables. ? Try some vegetarian meals using beans and peas. Add garbanzo or kidney beans to salads. Make burritos and tacos with mashed rodriguez beans or black beans. Where can you learn more? Go to http://www.clifford.com/  Enter H967 in the search box to learn more about \"DASH Diet: Care Instructions. \"  Current as of: January 10, 2022               Content Version: 13.2  © 2006-2022 [facility]. Care instructions adapted under license by [employer] (which disclaims liability or warranty for this information). If you have questions about a medical condition or this instruction, always ask your healthcare professional. [address] any warranty or liability for your use of this information. DASH Diet: Care Instructions  Your Care Instructions     The DASH diet is an eating plan that can help lower your blood pressure. DASH stands for Dietary Approaches to Stop Hypertension. Hypertension is high blood pressure. The DASH diet focuses on eating foods that are high in calcium, potassium, and magnesium. These nutrients can lower blood pressure. The foods that are highest in these nutrients are fruits, vegetables, low-fat dairy products, nuts, seeds, and legumes. But taking calcium, potassium, and magnesium supplements instead of eating foods that are high in those nutrients does not have the same effect. The DASH diet also includes whole grains, fish, and poultry. The DASH diet is one of several lifestyle changes your doctor may recommend to lower your high blood pressure. Your doctor may also want you to decrease the amount of sodium in your diet. Lowering sodium while following the DASH diet can lower blood pressure even further than just the DASH diet alone.   Follow-up care is a key part of your treatment and safety. Be sure to make and go to all appointments, and call your doctor if you are having problems. It's also a good idea to know your test results and keep a list of the medicines you take. How can you care for yourself at home? Following the DASH diet  · Eat 4 to 5 servings of fruit each day. A serving is 1 medium-sized piece of fruit, ½ cup chopped or canned fruit, 1/4 cup dried fruit, or 4 ounces (½ cup) of fruit juice. Choose fruit more often than fruit juice. · Eat 4 to 5 servings of vegetables each day. A serving is 1 cup of lettuce or raw leafy vegetables, ½ cup of chopped or cooked vegetables, or 4 ounces (½ cup) of vegetable juice. Choose vegetables more often than vegetable juice. · Get 2 to 3 servings of low-fat and fat-free dairy each day. A serving is 8 ounces of milk, 1 cup of yogurt, or 1 ½ ounces of cheese. · Eat 6 to 8 servings of grains each day. A serving is 1 slice of bread, 1 ounce of dry cereal, or ½ cup of cooked rice, pasta, or cooked cereal. Try to choose whole-grain products as much as possible. · Limit lean meat, poultry, and fish to 2 servings each day. A serving is 3 ounces, about the size of a deck of cards. · Eat 4 to 5 servings of nuts, seeds, and legumes (cooked dried beans, lentils, and split peas) each week. A serving is 1/3 cup of nuts, 2 tablespoons of seeds, or ½ cup of cooked beans or peas. · Limit fats and oils to 2 to 3 servings each day. A serving is 1 teaspoon of vegetable oil or 2 tablespoons of salad dressing. · Limit sweets and added sugars to 5 servings or less a week. A serving is 1 tablespoon jelly or jam, ½ cup sorbet, or 1 cup of lemonade. · Eat less than 2,300 milligrams (mg) of sodium a day. If you limit your sodium to 1,500 mg a day, you can lower your blood pressure even more. · Be aware that all of these are the suggested number of servings for people who eat 1,800 to 2,000 calories a day.  Your recommended number of servings may be different if you need more or fewer calories. Tips for success  · Start small. Do not try to make dramatic changes to your diet all at once. You might feel that you are missing out on your favorite foods and then be more likely to not follow the plan. Make small changes, and stick with them. Once those changes become habit, add a few more changes. · Try some of the following:  ? Make it a goal to eat a fruit or vegetable at every meal and at snacks. This will make it easy to get the recommended amount of fruits and vegetables each day. ? Try yogurt topped with fruit and nuts for a snack or healthy dessert. ? Add lettuce, tomato, cucumber, and onion to sandwiches. ? Combine a ready-made pizza crust with low-fat mozzarella cheese and lots of vegetable toppings. Try using tomatoes, squash, spinach, broccoli, carrots, cauliflower, and onions. ? Have a variety of cut-up vegetables with a low-fat dip as an appetizer instead of chips and dip. ? Sprinkle sunflower seeds or chopped almonds over salads. Or try adding chopped walnuts or almonds to cooked vegetables. ? Try some vegetarian meals using beans and peas. Add garbanzo or kidney beans to salads. Make burritos and tacos with mashed rodriguez beans or black beans. Where can you learn more? Go to http://www.clifford.com/  Enter H967 in the search box to learn more about \"DASH Diet: Care Instructions. \"  Current as of: January 10, 2022               Content Version: 13.2  © 9248-1501 [employer]. Care instructions adapted under license by [employer] (which disclaims liability or warranty for this information). If you have questions about a medical condition or this instruction, always ask your healthcare professional. [place] [address] any warranty or liability for your use of this information.

## 2022-03-14 NOTE — PROGRESS NOTES
Cheko Randle, 68 y.o.,  male    SUBJECTIVE  Ff-up  No new concerns    HTN- Continues to take diovan , felodipine, hydralazine  and coreg . Pt with h/o CKD. He has recurrent rash now seeing dr. Jose Cordero whom he reports does not think reaction to any of previous antihypertensives norvasc or lisinopril. HL-on mevacor, reviewed labs LDL 93    He is a chronic smoker, about 5 cig/ day. aaa screen neg. Old granulomatous disease on  CXR 2020, however did not pursue CT chest- advised to get done to r/o malignancy, reordered CT    Hypothyroidism-reports  Compliant with 150 mcgs synthroid/day. Reviewed normal TSH 3/2022    Remote h/o throat ca s/p radiation/chemotherapy 2003    ED- on prn viagra, requesting refill      ROS:  See HPI, all others negative        Patient Active Problem List   Diagnosis Code    Essential hypertension I10    Acquired hypothyroidism E03.9    Tobacco use Z72.0    Stage 3a chronic kidney disease ([facility] [address] N18.31    Abnormal stress test R94.39    Cardiomyopathy ([facility] [address] I42.9       Current Outpatient Medications   Medication Sig Dispense Refill    valsartan (DIOVAN) 80 mg tablet Take 1 Tablet by mouth daily. 90 Tablet 1    sildenafil citrate (Viagra) 50 mg tablet Take 1 Tablet by mouth as needed for Erectile Dysfunction. 12 Tablet 5    carvediloL (Coreg) 25 mg tablet Take 1 Tablet by mouth two (2) times daily (with meals). 180 Tablet 1    felodipine (PLENDIL SR) 5 mg 24 hr tablet Take 1 Tablet by mouth daily. 90 Tablet 1    clobetasoL (TEMOVATE) 0.05 % ointment Apply  to affected area two (2) times a day. 30 g 0    levothyroxine (SYNTHROID) 150 mcg tablet Take 1 Tab by mouth Daily (before breakfast). 90 Tab 3    hydrALAZINE (APRESOLINE) 50 mg tablet Take 1 Tab by mouth three (3) times daily. 270 Tab 3    lovastatin (MEVACOR) 20 mg tablet Take 2 Tabs by mouth nightly.  180 Tab 3       Allergies   Allergen Reactions    Lisinopril Rash    Norvasc [Amlodipine] Itching       Past Medical History:   Diagnosis Date    Cancer [facility]) 2012    throat    Gout     Hx of colonoscopy with polypectomy 11/15/2016    rpt 5 years 2021, dr. Jose Miller    Hypertension     Pancreatitis 1989    Thyroid disease        Social History     Socioeconomic History    Marital status:      Spouse name: Not on file    Number of children: Not on file    Years of education: Not on file    Highest education level: Not on file   Occupational History    Not on file   Tobacco Use    Smoking status: Current Every Day Smoker     Packs/day: 0.25     Years: 50.00     Pack years: 12.50    Smokeless tobacco: Never Used   Substance and Sexual Activity    Alcohol use: Yes     Comment: 3    Drug use: No    Sexual activity: Yes     Partners: Female   Other Topics Concern    Not on file   Social History Narrative    Not on file     Social Determinants of Health     Financial Resource Strain:     Difficulty of Paying Living Expenses: Not on file   Food Insecurity:     Worried About Running Out of Food in the Last Year: Not on file    Janel of Food in the Last Year: Not on file   Transportation Needs:     Lack of Transportation (Medical): Not on file    Lack of Transportation (Non-Medical):  Not on file   Physical Activity:     Days of Exercise per Week: Not on file    Minutes of Exercise per Session: Not on file   Stress:     Feeling of Stress : Not on file   Social Connections:     Frequency of Communication with Friends and Family: Not on file    Frequency of Social Gatherings with Friends and Family: Not on file    Attends Church Services: Not on file    Active Member of Clubs or Organizations: Not on file    Attends Club or Organization Meetings: Not on file    Marital Status: Not on file   Intimate Partner Violence:     Fear of Current or Ex-Partner: Not on file    Emotionally Abused: Not on file    Physically Abused: Not on file    Sexually Abused: Not on file   Housing Stability:     Unable to Pay for Housing in the Last Year: Not on file    Number of Places Lived in the Last Year: Not on file    Unstable Housing in the Last Year: Not on file       Family History   Problem Relation Age of Onset    Cancer Father          OBJECTIVE    Physical Exam:     Visit Vitals  /72 (BP 1 Location: Left upper arm, BP Patient Position: Sitting, BP Cuff Size: Adult)   Pulse 68   Temp 97 °F (36.1 °C) (Oral)   Resp 16   Ht 5' 6\" (1.676 m)   Wt 155 lb (70.3 kg)   SpO2 99%   BMI 25.02 kg/m²       General: alert, elderly, AA, in no apparent distress or pain  Head: atraumatic. Non-tender maxillary and frontal sinuses  Neck: supple, no adenopathy palpated  CVS: normal rate, regular rhythm, distinct S1 and S2  Lungs:clear to ausculation bilaterally, no crackles, wheezing or rhonchi noted  Abdomen: normoactive bowel sounds, soft, non-tender  Extremities: no edema, no cyanosis, MSK grossly normal  Skin: warm, no lesions, rashes noted  Psych:  mood and affect normal  Results for orders placed or performed during the hospital encounter of 03/07/22   TSH 3RD GENERATION   Result Value Ref Range    TSH 1.78 0.36 - 0.89 uIU/mL   METABOLIC PANEL, COMPREHENSIVE   Result Value Ref Range    Sodium 143 136 - 145 mmol/L    Potassium 4.0 3.5 - 5.5 mmol/L    Chloride 113 (H) 100 - 111 mmol/L    CO2 24 21 - 32 mmol/L    Anion gap 6 3.0 - 18 mmol/L    Glucose 76 74 - 99 mg/dL    BUN 19 (H) 7.0 - 18 MG/DL    Creatinine 1.44 (H) 0.6 - 1.3 MG/DL    BUN/Creatinine ratio 13 12 - 20      GFR est AA 58 (L) >60 ml/min/1.73m2    GFR est non-AA 48 (L) >60 ml/min/1.73m2    Calcium 8.8 8.5 - 10.1 MG/DL    Bilirubin, total 0.4 0.2 - 1.0 MG/DL    ALT (SGPT) 14 (L) 16 - 61 U/L    AST (SGOT) 12 10 - 38 U/L    Alk.  phosphatase 88 45 - 117 U/L    Protein, total 6.6 6.4 - 8.2 g/dL    Albumin 3.1 (L) 3.4 - 5.0 g/dL    Globulin 3.5 2.0 - 4.0 g/dL    A-G Ratio 0.9 0.8 - 1.7     LIPID PANEL   Result Value Ref Range    LIPID PROFILE          Cholesterol, total 179 <200 MG/DL    Triglyceride 105 <150 MG/DL    HDL Cholesterol 65 (H) 40 - 60 MG/DL    LDL, calculated 93 0 - 100 MG/DL    VLDL, calculated 21 MG/DL    CHOL/HDL Ratio 2.8 0 - 5.0             ASSESSMENT/PLAN  Diagnoses and all orders for this visit:    1. Essential hypertension   controlled  cont felodipine 5 mg  Cont coreg 25 mg bid  Cont hydralazine 50 mg tid  Cont diova  ? allergy to norvasc/lisinopril, per derm dr. Sudhir Nguyen not a true allergy   Cont  mevacor  Cmp prior to next visit  DASH Diet    2. Acquired hypothyroidism  Stable  Cont 150 mcgs synthroid/day  TSH 3/2022 normal  Monitoring    3. Tobacco use  Discussed complete cessation    4. Stage 3a chronic kidney disease  Monitoring    5. Abnormal CXR  Old granuloma, advised to pursue Chest Ct with smoking h/o    6. Pure hypercholesterolemia  Cont mevacor  Lipid panel 3/2022    7. Erectile dysfunction, unspecified  Cont prn viagra    Follow-up and Dispositions    · Return in about 3 months (around 6/14/2022), or if symptoms worsen or fail to improve, for routine chronic illness care, non-fasting labs prior to your next visit. Patient understands plan of care. Patient has provided input and agrees with goals.

## 2022-03-18 PROBLEM — N18.31 STAGE 3A CHRONIC KIDNEY DISEASE (HCC): Status: ACTIVE | Noted: 2018-04-17

## 2022-03-19 PROBLEM — I10 ESSENTIAL HYPERTENSION: Status: ACTIVE | Noted: 2018-03-27

## 2022-03-19 PROBLEM — I42.9 CARDIOMYOPATHY (HCC): Status: ACTIVE | Noted: 2021-02-12

## 2022-03-19 PROBLEM — E03.9 ACQUIRED HYPOTHYROIDISM: Status: ACTIVE | Noted: 2018-03-27

## 2022-03-20 PROBLEM — R94.39 ABNORMAL STRESS TEST: Status: ACTIVE | Noted: 2021-02-12

## 2022-03-20 PROBLEM — Z72.0 TOBACCO USE: Status: ACTIVE | Noted: 2018-03-27

## 2022-04-28 RX ORDER — LEVOTHYROXINE SODIUM 150 UG/1
150 TABLET ORAL
Qty: 90 TABLET | Refills: 3 | Status: SHIPPED | OUTPATIENT
Start: 2022-04-28

## 2022-04-28 NOTE — TELEPHONE ENCOUNTER
This patient contacted office for the following prescriptions to be filled:    Last office visit: 3/14/2022  Follow up appointment: 6/14/2022  Medication requested :   Requested Prescriptions     Pending Prescriptions Disp Refills    levothyroxine (SYNTHROID) 150 mcg tablet 90 Tablet 3     Sig: Take 1 Tablet by mouth Daily (before breakfast).      PCP: Torres ruffin or Local pharmacy name [facility] [address] [phone]

## 2022-05-27 DIAGNOSIS — I10 ESSENTIAL HYPERTENSION: Primary | ICD-10-CM

## 2022-05-27 RX ORDER — HYDRALAZINE HYDROCHLORIDE 50 MG/1
50 TABLET, FILM COATED ORAL 3 TIMES DAILY
Qty: 270 TABLET | Refills: 3 | Status: SHIPPED | OUTPATIENT
Start: 2022-05-27

## 2022-05-27 NOTE — TELEPHONE ENCOUNTER
Requested Prescriptions     Pending Prescriptions Disp Refills    hydrALAZINE (APRESOLINE) 50 mg tablet 270 Tablet 3     Sig: Take 1 Tablet by mouth three (3) times daily.

## 2022-07-05 ENCOUNTER — HOSPITAL ENCOUNTER (OUTPATIENT)
Dept: LAB | Age: 77
Discharge: HOME | End: 2022-07-05
Payer: MEDICARE

## 2022-07-05 LAB
ALBUMIN SERPL-MCNC: 3.1 G/DL (ref 3.4–5)
ALBUMIN/GLOB SERPL: 1 {RATIO} (ref 0.8–1.7)
ALP SERPL-CCNC: 77 U/L (ref 45–117)
ALT SERPL-CCNC: 14 U/L (ref 16–61)
ANION GAP SERPL CALC-SCNC: 6 MMOL/L (ref 3–18)
AST SERPL-CCNC: 16 U/L (ref 10–38)
BILIRUB SERPL-MCNC: 0.7 MG/DL (ref 0.2–1)
BUN SERPL-MCNC: 18 MG/DL (ref 7–18)
BUN/CREAT SERPL: 13 (ref 12–20)
CALCIUM SERPL-MCNC: 8.5 MG/DL (ref 8.5–10.1)
CHLORIDE SERPL-SCNC: 112 MMOL/L (ref 100–111)
CO2 SERPL-SCNC: 27 MMOL/L (ref 21–32)
CREAT SERPL-MCNC: 1.36 MG/DL (ref 0.6–1.3)
GLOBULIN SER CALC-MCNC: 3.1 G/DL (ref 2–4)
GLUCOSE SERPL-MCNC: 99 MG/DL (ref 74–99)
POTASSIUM SERPL-SCNC: 3.4 MMOL/L (ref 3.5–5.5)
PROT SERPL-MCNC: 6.2 G/DL (ref 6.4–8.2)
SODIUM SERPL-SCNC: 145 MMOL/L (ref 136–145)

## 2022-07-05 PROCEDURE — 80053 COMPREHEN METABOLIC PANEL: CPT

## 2022-07-05 PROCEDURE — 36415 COLL VENOUS BLD VENIPUNCTURE: CPT

## 2022-07-07 DIAGNOSIS — E87.6 HYPOKALEMIA: Primary | ICD-10-CM

## 2022-07-11 RX ORDER — FELODIPINE 5 MG/1
5 TABLET, EXTENDED RELEASE ORAL DAILY
Qty: 90 TABLET | Refills: 1 | Status: SHIPPED | OUTPATIENT
Start: 2022-07-11

## 2022-07-14 ENCOUNTER — HOSPITAL ENCOUNTER (OUTPATIENT)
Dept: LAB | Age: 77
Discharge: HOME | End: 2022-07-14
Payer: MEDICARE

## 2022-07-14 ENCOUNTER — HOSPITAL ENCOUNTER (OUTPATIENT)
Dept: GENERAL RADIOLOGY | Age: 77
Discharge: HOME | End: 2022-07-14
Payer: MEDICARE

## 2022-07-14 ENCOUNTER — OFFICE VISIT (OUTPATIENT)
Dept: FAMILY MEDICINE CLINIC | Age: 77
End: 2022-07-14
Payer: MEDICARE

## 2022-07-14 VITALS
WEIGHT: 146 LBS | RESPIRATION RATE: 16 BRPM | DIASTOLIC BLOOD PRESSURE: 70 MMHG | HEART RATE: 67 BPM | TEMPERATURE: 98.2 F | SYSTOLIC BLOOD PRESSURE: 130 MMHG | BODY MASS INDEX: 23.46 KG/M2 | OXYGEN SATURATION: 99 % | HEIGHT: 66 IN

## 2022-07-14 DIAGNOSIS — E03.9 ACQUIRED HYPOTHYROIDISM: ICD-10-CM

## 2022-07-14 DIAGNOSIS — N52.9 ERECTILE DYSFUNCTION, UNSPECIFIED ERECTILE DYSFUNCTION TYPE: ICD-10-CM

## 2022-07-14 DIAGNOSIS — J84.10 LUNG GRANULOMA (HCC): ICD-10-CM

## 2022-07-14 DIAGNOSIS — Z72.0 TOBACCO USE: ICD-10-CM

## 2022-07-14 DIAGNOSIS — E87.6 HYPOKALEMIA: ICD-10-CM

## 2022-07-14 DIAGNOSIS — R93.89 ABNORMAL CXR: ICD-10-CM

## 2022-07-14 DIAGNOSIS — E78.00 PURE HYPERCHOLESTEROLEMIA: ICD-10-CM

## 2022-07-14 DIAGNOSIS — N18.31 STAGE 3A CHRONIC KIDNEY DISEASE (HCC): ICD-10-CM

## 2022-07-14 DIAGNOSIS — I10 ESSENTIAL HYPERTENSION: Primary | ICD-10-CM

## 2022-07-14 LAB
ANION GAP SERPL CALC-SCNC: 5 MMOL/L (ref 3–18)
BUN SERPL-MCNC: 19 MG/DL (ref 7–18)
BUN/CREAT SERPL: 13 (ref 12–20)
CALCIUM SERPL-MCNC: 8.6 MG/DL (ref 8.5–10.1)
CHLORIDE SERPL-SCNC: 110 MMOL/L (ref 100–111)
CO2 SERPL-SCNC: 27 MMOL/L (ref 21–32)
CREAT SERPL-MCNC: 1.52 MG/DL (ref 0.6–1.3)
GLUCOSE SERPL-MCNC: 109 MG/DL (ref 74–99)
POTASSIUM SERPL-SCNC: 3.4 MMOL/L (ref 3.5–5.5)
SODIUM SERPL-SCNC: 142 MMOL/L (ref 136–145)

## 2022-07-14 PROCEDURE — G0463 HOSPITAL OUTPT CLINIC VISIT: HCPCS | Performed by: FAMILY MEDICINE

## 2022-07-14 PROCEDURE — 99214 OFFICE O/P EST MOD 30 MIN: CPT | Performed by: FAMILY MEDICINE

## 2022-07-14 PROCEDURE — 36415 COLL VENOUS BLD VENIPUNCTURE: CPT

## 2022-07-14 PROCEDURE — G8432 DEP SCR NOT DOC, RNG: HCPCS | Performed by: FAMILY MEDICINE

## 2022-07-14 PROCEDURE — G8754 DIAS BP LESS 90: HCPCS | Performed by: FAMILY MEDICINE

## 2022-07-14 PROCEDURE — G8536 NO DOC ELDER MAL SCRN: HCPCS | Performed by: FAMILY MEDICINE

## 2022-07-14 PROCEDURE — 80048 BASIC METABOLIC PNL TOTAL CA: CPT

## 2022-07-14 PROCEDURE — 1101F PT FALLS ASSESS-DOCD LE1/YR: CPT | Performed by: FAMILY MEDICINE

## 2022-07-14 PROCEDURE — 71046 X-RAY EXAM CHEST 2 VIEWS: CPT

## 2022-07-14 PROCEDURE — G8420 CALC BMI NORM PARAMETERS: HCPCS | Performed by: FAMILY MEDICINE

## 2022-07-14 PROCEDURE — G8427 DOCREV CUR MEDS BY ELIG CLIN: HCPCS | Performed by: FAMILY MEDICINE

## 2022-07-14 PROCEDURE — G8752 SYS BP LESS 140: HCPCS | Performed by: FAMILY MEDICINE

## 2022-07-14 PROCEDURE — 1123F ACP DISCUSS/DSCN MKR DOCD: CPT | Performed by: FAMILY MEDICINE

## 2022-07-14 NOTE — PROGRESS NOTES
Chief Complaint   Patient presents with    Cholesterol Problem    Erectile Dysfunction    Hypertension    Thyroid Problem     hx of Hypothyroidism       1. \"Have you been to the ER, urgent care clinic since your last visit? Hospitalized since your last visit? \" No    2. \"Have you seen or consulted any other health care providers outside of the [address] [place] [address] since your last visit? \" No     3. For patients aged 39-70: Has the patient had a colonoscopy / FIT/ Cologuard? NA - based on age      If the patient is female:    4. For patients aged 41-77: Has the patient had a mammogram within the past 2 years? NA - based on age or sex      11. For patients aged 21-65: Has the patient had a pap smear?  NA - based on age or sex

## 2022-07-14 NOTE — PATIENT INSTRUCTIONS
Stopping Smoking: Care Instructions  Your Care Instructions     Cigarette smokers crave the nicotine in cigarettes. Giving it up is much harder than simply changing a habit. Your body has to stop craving the nicotine. It is hard to quit, but you can do it. There are many tools that people use to quit smoking. You may find that combining tools works best for you. There are several steps to quitting. First you get ready to quit. Then you get support to help you. After that, you learn new skills and behaviors to become a nonsmoker. For many people, a necessary step is getting and using medicine. Your doctor will help you set up the plan that best meets your needs. You may want to attend a smoking cessation program to help you quit smoking. When you choose a program, look for one that has proven success. Ask your doctor for ideas. You will greatly increase your chances of success if you take medicine as well as get counseling or join a cessation program.  Some of the changes you feel when you first quit tobacco are uncomfortable. Your body will miss the nicotine at first, and you may feel short-tempered and grumpy. You may have trouble sleeping or concentrating. Medicine can help you deal with these symptoms. You may struggle with changing your smoking habits and rituals. The last step is the tricky one: Be prepared for the smoking urge to continue for a time. This is a lot to deal with, but keep at it. You will feel better. Follow-up care is a key part of your treatment and safety. Be sure to make and go to all appointments, and call your doctor if you are having problems. It's also a good idea to know your test results and keep a list of the medicines you take. How can you care for yourself at home? · Ask your family, friends, and coworkers for support. You have a better chance of quitting if you have help and support.   · Join a support group, such as Nicotine Anonymous, for people who are trying to quit smoking. · Consider signing up for a smoking cessation program, such as the American Lung Association's Freedom from Smoking program.  · Get text messaging support. Go to the website at www.smokefree. gov to sign up for the [facility] program.  · Set a quit date. Pick your date carefully so that it is not right in the middle of a big deadline or stressful time. Once you quit, do not even take a puff. Get rid of all ashtrays and lighters after your last cigarette. Clean your house and your clothes so that they do not smell of smoke. · Learn how to be a nonsmoker. Think about ways you can avoid those things that make you reach for a cigarette. ? Avoid situations that put you at greatest risk for smoking. For some people, it is hard to have a drink with friends without smoking. For others, they might skip a coffee break with coworkers who smoke. ? Change your daily routine. Take a different route to work or eat a meal in a different place. · Cut down on stress. Calm yourself or release tension by doing an activity you enjoy, such as reading a book, taking a hot bath, or gardening. · Talk to your doctor or pharmacist about nicotine replacement therapy, which replaces the nicotine in your body. You still get nicotine but you do not use tobacco. Nicotine replacement products help you slowly reduce the amount of nicotine you need. These products come in several forms, many of them available over-the-counter:  ? Nicotine patches  ? Nicotine gum and lozenges  ? Nicotine inhaler  · Ask your doctor about bupropion (Wellbutrin) or varenicline (Chantix), which are prescription medicines. They do not contain nicotine. They help you by reducing withdrawal symptoms, such as stress and anxiety. · Some people find hypnosis, acupuncture, and massage helpful for ending the smoking habit. · Eat a healthy diet and get regular exercise. Having healthy habits will help your body move past its craving for nicotine.   · Be prepared to keep trying. Most people are not successful the first few times they try to quit. Do not get mad at yourself if you smoke again. Make a list of things you learned and think about when you want to try again, such as next week, next month, or next year. Where can you learn more? Go to http://www.gray.com/  Enter [id number] in the search box to learn more about \"Stopping Smoking: Care Instructions. \"  Current as of: October 28, 2021               Content Version: 13.2  © 6341-5059 [employer]. Care instructions adapted under license by [employer] (which disclaims liability or warranty for this information). If you have questions about a medical condition or this instruction, always ask your healthcare professional. [address] any warranty or liability for your use of this information.

## 2022-07-14 NOTE — PROGRESS NOTES
Patient identified with 2 identifiers (name and ). Spoke with patient around 4 pm 2022. Patient aware lab show low potassium and needs repeat of labs. Patient will have lab drawn 2022 before his appt.

## 2022-07-17 NOTE — PROGRESS NOTES
Demetria Partida, 68 y.o.,  male    SUBJECTIVE  Ff-up    HTN- Continues to take diovan , felodipine, hydralazine  and coreg . Pt with h/o CKD. HL-on mevacor, reviewed labs LDL 93    He is a chronic smoker, about 5 cig/ day. aaa screen neg. Old granulomatous disease on  CXR 2020, however did not pursue CT chest- advised to get done to r/o malignancy, reordered CT on last visit, says due to cost did not pursue. Hypothyroidism-reports  Compliant with 150 mcgs synthroid/day. Reviewed normal TSH 3/2022    Remote h/o throat ca s/p radiation/chemotherapy 2003    ED- on prn viagra, requesting refill      ROS:  See HPI, all others negative        Patient Active Problem List   Diagnosis Code    Essential hypertension I10    Acquired hypothyroidism E03.9    Tobacco use Z72.0    Stage 3a [facility] ([facility] N18.31    Abnormal stress test R94.39    Cardiomyopathy ([facility] I42.9       Current Outpatient Medications   Medication Sig Dispense Refill    felodipine (PLENDIL SR) 5 mg 24 hr tablet Take 1 Tablet by mouth daily. 90 Tablet 1    hydrALAZINE (APRESOLINE) 50 mg tablet Take 1 Tablet by mouth three (3) times daily. 270 Tablet 3    levothyroxine (SYNTHROID) 150 mcg tablet Take 1 Tablet by mouth Daily (before breakfast). 90 Tablet 3    valsartan (DIOVAN) 80 mg tablet Take 1 Tablet by mouth daily. 90 Tablet 1    sildenafil citrate (Viagra) 50 mg tablet Take 1 Tablet by mouth as needed for Erectile Dysfunction. 12 Tablet 5    carvediloL (Coreg) 25 mg tablet Take 1 Tablet by mouth two (2) times daily (with meals). 180 Tablet 1    clobetasoL (TEMOVATE) 0.05 % ointment Apply  to affected area two (2) times a day. 30 g 0    lovastatin (MEVACOR) 20 mg tablet Take 2 Tabs by mouth nightly.  180 Tab 3       Allergies   Allergen Reactions    Lisinopril Rash    Norvasc [Amlodipine] Itching       Past Medical History:   Diagnosis Date    Cancer ([facility] 2012    throat    Gout     Hx of colonoscopy with polypectomy 11/15/2016    [facility] 5 years 2021, dr. Dex Campbell    Hypertension     Pancreatitis 1989    Thyroid disease        Social History     Socioeconomic History    Marital status:      Spouse name: Not on file    Number of children: Not on file    Years of education: Not on file    Highest education level: Not on file   Occupational History    Not on file   Tobacco Use    Smoking status: Current Every Day Smoker     Packs/day: 0.25     Years: 50.00     Pack years: 12.50    Smokeless tobacco: Never Used   Substance and Sexual Activity    Alcohol use: Yes     Comment: 3    Drug use: No    Sexual activity: Yes     Partners: Female   Other Topics Concern    Not on file   Social History Narrative    Not on file     Social Determinants of Health     Financial Resource Strain:     Difficulty of Paying Living Expenses: Not on file   Food Insecurity:     Worried About Running Out of Food in the Last Year: Not on file    Janel of Food in the Last Year: Not on file   Transportation Needs:     Lack of Transportation (Medical): Not on file    Lack of Transportation (Non-Medical):  Not on file   Physical Activity:     Days of Exercise per Week: Not on file    Minutes of Exercise per Session: Not on file   Stress:     Feeling of Stress : Not on file   Social Connections:     Frequency of Communication with Friends and Family: Not on file    Frequency of Social Gatherings with Friends and Family: Not on file    Attends Yarsanism Services: Not on file    Active Member of Clubs or Organizations: Not on file    Attends Club or Organization Meetings: Not on file    Marital Status: Not on file   Intimate Partner Violence:     Fear of Current or Ex-Partner: Not on file    Emotionally Abused: Not on file    Physically Abused: Not on file    Sexually Abused: Not on file   Housing Stability:     Unable to Pay for Housing in the Last Year: Not on file    Number of [place] in the Last Year: Not on file    Unstable Housing in the Last Year: Not on file       Family History   Problem Relation Age of Onset    Cancer Father          OBJECTIVE    Physical Exam:     Visit Vitals  /70 (BP 1 Location: Right arm, BP Patient Position: Sitting, BP Cuff Size: Large adult)   Pulse 67   Temp 98.2 °F (36.8 °C) (Temporal)   Resp 16   Ht 5' 6\" (1.676 m)   Wt 146 lb (66.2 kg)   SpO2 99%   BMI 23.57 kg/m²       General: alert, elderly, AA, in no apparent distress or pain  Head: atraumatic. Non-tender maxillary and frontal sinuses  Neck: supple, no adenopathy palpated  CVS: normal rate, regular rhythm, distinct S1 and S2  Lungs:clear to ausculation bilaterally, no crackles, wheezing or rhonchi noted  Abdomen: normoactive bowel sounds, soft, non-tender  Extremities: no edema, no cyanosis, MSK grossly normal  Skin: warm, no lesions, rashes noted  Psych:  mood and affect normal  Results for orders placed or performed during the hospital encounter of 49/81/75   METABOLIC PANEL, BASIC   Result Value Ref Range    Sodium 142 136 - 145 mmol/L    Potassium 3.4 (L) 3.5 - 5.5 mmol/L    Chloride 110 100 - 111 mmol/L    CO2 27 21 - 32 mmol/L    Anion gap 5 3.0 - 18 mmol/L    Glucose 109 (H) 74 - 99 mg/dL    BUN 19 (H) 7.0 - 18 MG/DL    Creatinine 1.52 (H) 0.6 - 1.3 MG/DL    BUN/Creatinine ratio 13 12 - 20      GFR est AA 54 (L) >60 ml/min/1.73m2    GFR est non-AA 45 (L) >60 ml/min/1.73m2    Calcium 8.6 8.5 - 10.1 MG/DL           ASSESSMENT/PLAN  Diagnoses and all orders for this visit:    1. Essential hypertension   controlled  cont felodipine 5 mg  Cont coreg 25 mg bid  Cont hydralazine 50 mg tid  Cont diova  ? allergy to norvasc/lisinopril, per derm dr. Buffy Jung not a true allergy   Cont  mevacor  DASH Diet    2. Acquired hypothyroidism  Stable  Cont 150 mcgs synthroid/day  TSH 3/2022 normal  Monitoring    3. Tobacco use  Discussed complete cessation    4. Stage 3a chronic kidney disease  Monitoring    5.   Abnormal CXR  Old granuloma, advised to pursue Chest Ct with smoking h/o- says cost issue  Will order CXR soon. 6. Pure hypercholesterolemia  Cont mevacor  Lipid panel 3/2022    7. Erectile dysfunction, unspecified  Cont prn viagra    Follow-up and Dispositions    · Return in about 5 months (around 12/14/2022) for plan on Medicare wellness on next visit. Patient understands plan of care. Patient has provided input and agrees with goals.

## 2022-07-18 DIAGNOSIS — E87.6 HYPOKALEMIA: Primary | ICD-10-CM

## 2022-07-18 RX ORDER — POTASSIUM CHLORIDE 20 MEQ/1
20 TABLET, EXTENDED RELEASE ORAL 2 TIMES DAILY
Qty: 30 TABLET | Refills: 0 | Status: SHIPPED | OUTPATIENT
Start: 2022-07-18

## 2022-07-18 NOTE — PROGRESS NOTES
Mildly low potassium level, will start him on kcl 20 meqs OD, recheck BMP next week  Order placed, erx done, pls notify pt  Cxr looks normal

## 2022-07-25 NOTE — PROGRESS NOTES
Patient identified with 2 identifiers (name and ).   Patient aware of Mildly low potassium level, will start him on kcl 20 meqs OD, recheck BMP next week  Order placed, erx done [employer] looks normal

## 2022-08-03 ENCOUNTER — HOSPITAL ENCOUNTER (OUTPATIENT)
Dept: LAB | Age: 77
Discharge: HOME | End: 2022-08-03
Payer: MEDICARE

## 2022-08-03 DIAGNOSIS — E87.6 HYPOKALEMIA: ICD-10-CM

## 2022-08-03 LAB
ANION GAP SERPL CALC-SCNC: 6 MMOL/L (ref 3–18)
BUN SERPL-MCNC: 17 MG/DL (ref 7–18)
BUN/CREAT SERPL: 10 (ref 12–20)
CALCIUM SERPL-MCNC: 8.7 MG/DL (ref 8.5–10.1)
CHLORIDE SERPL-SCNC: 116 MMOL/L (ref 100–111)
CO2 SERPL-SCNC: 24 MMOL/L (ref 21–32)
CREAT SERPL-MCNC: 1.72 MG/DL (ref 0.6–1.3)
GLUCOSE SERPL-MCNC: 77 MG/DL (ref 74–99)
POTASSIUM SERPL-SCNC: 4.2 MMOL/L (ref 3.5–5.5)
SODIUM SERPL-SCNC: 146 MMOL/L (ref 136–145)

## 2022-08-03 PROCEDURE — 80048 BASIC METABOLIC PNL TOTAL CA: CPT

## 2022-08-03 PROCEDURE — 36415 COLL VENOUS BLD VENIPUNCTURE: CPT

## 2022-08-04 DIAGNOSIS — N18.30 CHRONIC RENAL IMPAIRMENT, STAGE 3 (MODERATE), UNSPECIFIED WHETHER STAGE 3A OR 3B CKD (HCC): Primary | ICD-10-CM

## 2022-08-04 NOTE — PROGRESS NOTES
K is back to normal, complete Kcl replacement and the stop after he finishes all the pills  Kidney function continues to be impaired  Plan to recheck BMP next month, order placed, pls notify pt Problem: Patient Care Overview (Adult)  Goal: Plan of Care Review  Outcome: Ongoing (interventions implemented as appropriate)    10/15/17 0852   Coping/Psychosocial Response Interventions   Plan Of Care Reviewed With patient   Outcome Evaluation   Outcome Summary/Follow up Plan PT eval complete. pt required Martine x2 to perform bed mobility with HOB elevated and rails, maxA x2 to stand and pivot to recliner. pt very anxious with mobiltiy. Pt would benefit from continued skilled PT to address weakness, impaired balance, decreased functional mobility. Anticipate D/C to SNF.          Problem: Inpatient Physical Therapy  Goal: Bed Mobility Goal LTG- PT  Outcome: Ongoing (interventions implemented as appropriate)    10/15/17 0852   Bed Mobility PT LTG   Bed Mobility PT LTG, Date Established 10/15/17   Bed Mobility PT LTG, Time to Achieve by discharge   Bed Mobility PT LTG, Activity Type all bed mobility   Bed Mobility PT LTG, [place] Level contact guard assist   Bed Mobility PT Goal LTG, Assist Device bed rails       Goal: Transfer Training Goal 1 LTG- PT  Outcome: Ongoing (interventions implemented as appropriate)    10/15/17 0852   Transfer Training PT LTG   Transfer Training PT LTG, Date Established 10/15/17   Transfer Training PT LTG, Time to Achieve by discharge   Transfer Training PT LTG, Activity Type bed to chair /chair to bed;sit to stand/stand to sit   Transfer Training PT LTG, [place] Level minimum assist (75% patient effort)   Transfer Training PT LTG, Assist Device walker, rolling       Goal: Strength Goal LTG- PT  Outcome: Ongoing (interventions implemented as appropriate)    10/15/17 0852   Strength Goal PT LTG   Strength Goal PT LTG, Date Established 10/15/17   Strength Goal PT LTG, Time to Achieve by discharge   Strength Goal PT LTG, Functional Goal AROM ther ex BLE 15 reps       Goal: Wheelchair Propulsion Goal LTG- PT  Outcome: Ongoing (interventions implemented as appropriate)    10/15/17  0852   Wheelchair Propulsion PT LTG   Wheelchair Propulsion Goal PT LTG, Date Established 10/15/17   Wheelchair Propulsion Goal PT LTG, Time to Achieve by discharge   Wheelchair Propulsion Goal PT LTG, [place] Level supervision required   Wheelchair Propulsion Goal PT LTG, Distance to Achieve 50

## 2022-08-05 NOTE — PROGRESS NOTES
[phone] (home)  tried reaching Mr. Myles Luis (the person has a voicemail that has not been set up yet).

## 2022-08-08 ENCOUNTER — OFFICE VISIT (OUTPATIENT)
Dept: CARDIOLOGY CLINIC | Age: 77
End: 2022-08-08
Payer: MEDICARE

## 2022-08-08 VITALS
SYSTOLIC BLOOD PRESSURE: 136 MMHG | HEART RATE: 60 BPM | DIASTOLIC BLOOD PRESSURE: 60 MMHG | BODY MASS INDEX: 23.3 KG/M2 | OXYGEN SATURATION: 100 % | HEIGHT: 66 IN | WEIGHT: 145 LBS

## 2022-08-08 DIAGNOSIS — I42.2 HYPERTROPHIC CARDIOMYOPATHY (HCC): ICD-10-CM

## 2022-08-08 DIAGNOSIS — E78.5 HYPERLIPIDEMIA, UNSPECIFIED HYPERLIPIDEMIA TYPE: ICD-10-CM

## 2022-08-08 DIAGNOSIS — I10 ESSENTIAL HYPERTENSION: Primary | ICD-10-CM

## 2022-08-08 DIAGNOSIS — N18.31 STAGE 3A CHRONIC KIDNEY DISEASE (HCC): ICD-10-CM

## 2022-08-08 PROCEDURE — G8432 DEP SCR NOT DOC, RNG: HCPCS | Performed by: INTERNAL MEDICINE

## 2022-08-08 PROCEDURE — G8427 DOCREV CUR MEDS BY ELIG CLIN: HCPCS | Performed by: INTERNAL MEDICINE

## 2022-08-08 PROCEDURE — G8420 CALC BMI NORM PARAMETERS: HCPCS | Performed by: INTERNAL MEDICINE

## 2022-08-08 PROCEDURE — G8752 SYS BP LESS 140: HCPCS | Performed by: INTERNAL MEDICINE

## 2022-08-08 PROCEDURE — 99214 OFFICE O/P EST MOD 30 MIN: CPT | Performed by: INTERNAL MEDICINE

## 2022-08-08 PROCEDURE — 1101F PT FALLS ASSESS-DOCD LE1/YR: CPT | Performed by: INTERNAL MEDICINE

## 2022-08-08 PROCEDURE — 1123F ACP DISCUSS/DSCN MKR DOCD: CPT | Performed by: INTERNAL MEDICINE

## 2022-08-08 PROCEDURE — G8754 DIAS BP LESS 90: HCPCS | Performed by: INTERNAL MEDICINE

## 2022-08-08 PROCEDURE — G8536 NO DOC ELDER MAL SCRN: HCPCS | Performed by: INTERNAL MEDICINE

## 2022-08-08 NOTE — PROGRESS NOTES
1. Have you been to the ER, urgent care clinic since your last visit? Hospitalized since your last visit? No    2. Have you seen or consulted any other health care providers outside of the [address] [place] [address] since your last visit? Include any pap smears or colon screening.  No

## 2022-08-08 NOTE — PROGRESS NOTES
HISTORY OF PRESENT ILLNESS  Jeffrey Burton is a 68 y.o. male. 2/12/2021  Patient seen today for new patient evaluation. He is referred here for evaluation of chest pain. Patient was in emergency room May 2020 with episodes of chest pain. Patient sharp pain in the chest at that time. Since that time he has not had any recurrence of chest pain. He denies any shortness of breath, dizziness palpitation or other symptom. Patient subsequent stress test in 12/2020 that was reported abnormal due to low ejection fraction. It was thought to be under calculation possibly from LVH. Perfusion was reported normal.  3/16/2021  Patient presents to f/u for HTN and echo results. He reports Coreg recently increased to 25 mg /BID by PCP, and b/p remains elevated. Review of Systems   Constitutional:  Negative for chills and fever. HENT:  Negative for nosebleeds. Eyes:  Negative for blurred vision and double vision. Respiratory:  Negative for cough, hemoptysis, sputum production, shortness of breath and wheezing. Cardiovascular:  Negative for chest pain, palpitations, orthopnea, claudication, leg swelling and PND. Gastrointestinal:  Negative for abdominal pain, heartburn, nausea and vomiting. Musculoskeletal:  Negative for myalgias. Skin:  Negative for rash. Neurological:  Negative for dizziness, weakness and headaches. Endo/Heme/Allergies:  Does not bruise/bleed easily.    Family History   Problem Relation Age of Onset    Cancer Father        Past Medical History:   Diagnosis Date    Cancer ([facility] 2012    throat    Gout     Hx of colonoscopy with polypectomy 11/15/2016    rpt 5 years 2021, dr. Peng Born    Hypertension     Pancreatitis 1989    Thyroid disease        Past Surgical History:   Procedure Laterality Date    HX COLONOSCOPY  11/15/2016    repeat 5 years 2021, Dr [facility]         Social History     Tobacco Use    Smoking status: Every Day Packs/day: 0.25     Years: 50.00     Pack years: 12.50     Types: Cigarettes    Smokeless tobacco: Never   Substance Use Topics    Alcohol use: Yes     Comment: 3       Allergies   Allergen Reactions    Lisinopril Rash    Norvasc [Amlodipine] Itching       Prior to Admission medications    Medication Sig Start Date End Date Taking? Authorizing Provider   potassium chloride (K-DUR, KLOR-CON M20) 20 mEq tablet Take 1 Tablet by mouth two (2) times a day. 7/18/22  Yes Francis Canada MD   felodipine (PLENDIL SR) 5 mg 24 hr tablet Take 1 Tablet by mouth daily. 7/11/22  Yes Aisha Blount MD   hydrALAZINE (APRESOLINE) 50 mg tablet Take 1 Tablet by mouth three (3) times daily. 5/27/22  Yes Jacque Mari NP   levothyroxine (SYNTHROID) 150 mcg tablet Take 1 Tablet by mouth Daily (before breakfast). 4/28/22  Yes Aisha Blount MD   valsartan (DIOVAN) 80 mg tablet Take 1 Tablet by mouth daily. 3/14/22  Yes Aisha Blount MD   sildenafil citrate (Viagra) 50 mg tablet Take 1 Tablet by mouth as needed for Erectile Dysfunction. 3/14/22  Yes Aisha Blount MD   carvediloL (Coreg) 25 mg tablet Take 1 Tablet by mouth two (2) times daily (with meals). 2/11/22  Yes Francis Canada MD   clobetasoL (TEMOVATE) 0.05 % ointment Apply  to affected area two (2) times a day. 5/10/21  Yes Aisha Blount MD   lovastatin (MEVACOR) 20 mg tablet Take 2 Tabs by mouth nightly. 2/11/21  Yes Aisha Blount MD         Visit Vitals  /60   Pulse 60   Ht 5' 6\" (1.676 m)   Wt 65.8 kg (145 lb)   SpO2 100%   BMI 23.40 kg/m²         Physical Exam    Mr. Tima Cavanaugh has a reminder for a \"due or due soon\" health maintenance. I have asked that he contact his primary care provider for follow-up on this health maintenance. No flowsheet data found. I have personally reviewed patient's records available from hospital and other providers and incorporated findings in patient care.   Notes, lab, EKG, stress test, chest x-ray  Procedure Conclusion 12/2020    Nuclear Stress Test    Nuclear Cardiac Spect Rest then Gated Stress study. Leandrew Heys was used as the stressing method and agent. (Leandrew Heys given via a 10 - 20 sec injection.)   One day myocardial perfusion study. Date: 12/9/2020. Left ventricular perfusion is normal. Myocardial perfusion imaging supports an intermediate risk stress test.   There is no prior study available for comparison. .   Interpretation Summary 12/2020    Baseline ECG: Sinus bradycardia. LVH with repolorization; possible left atrial enlargement  Gated SPECT: Left ventricular function post-stress was abnormal. Calculated ejection fraction is 47%. There is no evidence of transient ischemic dilation (TID). The TID ratio is 1.17. Left ventricular perfusion is normal, no ischemia or previous infarct. Myocardial perfusion imaging supports an intermediate risk stress test due to calculated EF of 47%. Significant LVH present, possibly leading to underestimation of EF. Clinical correlation recommended. Echo 3/4/2021  Interpretation Summary  LV: Estimated LVEF is 60 - 65%. Normal cavity size and systolic function (ejection fraction normal). Severe apical hypertrophy. Moderate septal wall hypertrophy. Moderate posterior wall hypertrophy. Wall motion: normal. Moderate (grade 2) left ventricular diastolic dysfunction. LA: Left Atrium volume index is 30.05 mL/m2. RV: Normal right ventricular size and function. MV: Mild mitral valve regurgitation is present. IMPRESSION chest x-ray 12/2020    No acute pulmonic disease. Probable old granulomatous disease. Assessment         ICD-10-CM ICD-9-CM    1. Essential hypertension  I10 401.9     Stable continue treatment monitor      2. Hypertrophic cardiomyopathy (HCC)  I42.2 425.18     Severe apical hypertrophy. Clinically stable continue treatment      3. Stage 3a chronic kidney disease (HCC)  N18.31 585. 3     Stable monitor lab with PCP      4.  Hyperlipidemia, unspecified hyperlipidemia type  E78.5 272.4     Continue treatment lab with PCP        2/2021  Seen for low ejection fraction reported on nuclear scan. History of severe hypertension uncontrolled. Increase hydralazine and Coreg. Has side effect with lisinopril and amlodipine which are off. Check echo for LV function. Continue with other therapy  3/2021  Echo revealed EF 60-65% - with severe apical hypertrophy likely due to hypertensive heart disease. Blood pressure is uncontrolled. Will begin Valsartan 80 mg/ Day   Check BMP in 1 week and follow home chart if able. Encouraged smoking cessation   6/2021  Cardiac status stable. Hypertension much better controlled. Renal function stable continue current medical management  1/2022  Cardiac status stable. Blood pressure controlled. Labs reviewed continue treatment  8/2022  Stable cardiac status continue current medical management and monitor  There are no discontinued medications. No orders of the defined types were placed in this encounter. Follow-up and Dispositions    Return in about 6 months (around 2/8/2023).            Robert Chang MD

## 2022-08-16 NOTE — PROGRESS NOTES
Patient identified with 2 identifiers (name and ). Spoke with patients spouse (on [place]).  She is aware of +K is back to normal, complete Kcl replacement and the stop after he finishesall the pills  Kidney function continues to be impaired  Plan to recheck BMP next month,

## 2022-09-16 DIAGNOSIS — I10 ESSENTIAL HYPERTENSION: ICD-10-CM

## 2022-09-19 RX ORDER — VALSARTAN 80 MG/1
TABLET ORAL
Qty: 90 TABLET | Refills: 1 | Status: SHIPPED | OUTPATIENT
Start: 2022-09-19

## 2022-12-13 NOTE — PROGRESS NOTES
Chief Complaint   Patient presents with    Annual Wellness Visit    Cholesterol Problem    Erectile Dysfunction    Hypertension    Thyroid Problem     Hx of hypothyroid         1. \"Have you been to the ER, urgent care clinic since your last visit? Hospitalized since your last visit? \" No    2. \"Have you seen or consulted any other health care providers outside of the [address] [place] [address] since your last visit? \" No     3. For patients aged 39-70: Has the patient had a colonoscopy / FIT/ Cologuard? NA - based on age      If the patient is female:    4. For patients aged 41-77: Has the patient had a mammogram within the past 2 years? NA - based on age or sex      11. For patients aged 21-65: Has the patient had a pap smear? NA - based on age or sex     Abuse Screening Questionnaire 12/14/2022   Do you ever feel afraid of your partner? N   Are you in a relationship with someone who physically or mentally threatens you? N   Is it safe for you to go home? Y     Fall Risk Assessment, last 12 mths 12/14/2022   Able to walk? Yes   Fall in past 12 months? 0   Do you feel unsteady?  0   Are you worried about falling 0     3 most recent PHQ Screens 12/14/2022   Little interest or pleasure in doing things Not at all   Feeling down, depressed, irritable, or hopeless Not at all   Total Score PHQ 2 0   Trouble falling or staying asleep, or sleeping too much Not at all   Feeling tired or having little energy Not at all   Poor appetite, weight loss, or overeating Not at all   Feeling bad about yourself - or that you are a failure or have let yourself or your family down Not at all   Trouble concentrating on things such as school, work, reading, or watching TV Not at all   Moving or speaking so slowly that other people could have noticed; or the opposite being so fidgety that others notice Not at all   Thoughts of being better off dead, or hurting yourself in some way Not at all   PHQ 9 Score 0   How difficult have these problems made it for you to do your work, take care of your home and get along with others Not difficult at all

## 2022-12-14 ENCOUNTER — OFFICE VISIT (OUTPATIENT)
Dept: FAMILY MEDICINE CLINIC | Age: 77
End: 2022-12-14
Payer: MEDICARE

## 2022-12-14 ENCOUNTER — HOSPITAL ENCOUNTER (OUTPATIENT)
Dept: LAB | Age: 77
Discharge: HOME | End: 2022-12-14
Payer: MEDICARE

## 2022-12-14 VITALS
DIASTOLIC BLOOD PRESSURE: 70 MMHG | HEIGHT: 66 IN | OXYGEN SATURATION: 97 % | HEART RATE: 59 BPM | WEIGHT: 145 LBS | RESPIRATION RATE: 16 BRPM | TEMPERATURE: 97.8 F | SYSTOLIC BLOOD PRESSURE: 139 MMHG | BODY MASS INDEX: 23.3 KG/M2

## 2022-12-14 DIAGNOSIS — N52.9 ERECTILE DYSFUNCTION, UNSPECIFIED ERECTILE DYSFUNCTION TYPE: ICD-10-CM

## 2022-12-14 DIAGNOSIS — I10 ESSENTIAL HYPERTENSION: ICD-10-CM

## 2022-12-14 DIAGNOSIS — Z72.0 TOBACCO USE: ICD-10-CM

## 2022-12-14 DIAGNOSIS — R07.89 ATYPICAL CHEST PAIN: ICD-10-CM

## 2022-12-14 DIAGNOSIS — N18.31 STAGE 3A CHRONIC KIDNEY DISEASE (HCC): ICD-10-CM

## 2022-12-14 DIAGNOSIS — E78.00 PURE HYPERCHOLESTEROLEMIA: ICD-10-CM

## 2022-12-14 DIAGNOSIS — E03.9 ACQUIRED HYPOTHYROIDISM: ICD-10-CM

## 2022-12-14 DIAGNOSIS — Z00.00 MEDICARE ANNUAL WELLNESS VISIT, SUBSEQUENT: Primary | ICD-10-CM

## 2022-12-14 LAB
ALBUMIN SERPL-MCNC: 3.3 G/DL (ref 3.4–5)
ALBUMIN/GLOB SERPL: 0.9 {RATIO} (ref 0.8–1.7)
ALP SERPL-CCNC: 82 U/L (ref 45–117)
ALT SERPL-CCNC: 17 U/L (ref 16–61)
ANION GAP SERPL CALC-SCNC: 4 MMOL/L (ref 3–18)
AST SERPL-CCNC: 16 U/L (ref 10–38)
BILIRUB SERPL-MCNC: 0.2 MG/DL (ref 0.2–1)
BUN SERPL-MCNC: 16 MG/DL (ref 7–18)
BUN/CREAT SERPL: 10 (ref 12–20)
CALCIUM SERPL-MCNC: 9.1 MG/DL (ref 8.5–10.1)
CHLORIDE SERPL-SCNC: 112 MMOL/L (ref 100–111)
CHOLEST SERPL-MCNC: 190 MG/DL
CO2 SERPL-SCNC: 27 MMOL/L (ref 21–32)
CREAT SERPL-MCNC: 1.54 MG/DL (ref 0.6–1.3)
GLOBULIN SER CALC-MCNC: 3.5 G/DL (ref 2–4)
GLUCOSE SERPL-MCNC: 71 MG/DL (ref 74–99)
HDLC SERPL-MCNC: 74 MG/DL (ref 40–60)
HDLC SERPL: 2.6 {RATIO} (ref 0–5)
LDLC SERPL CALC-MCNC: 100.6 MG/DL (ref 0–100)
LIPID PROFILE,FLP: ABNORMAL
POTASSIUM SERPL-SCNC: 4 MMOL/L (ref 3.5–5.5)
PROT SERPL-MCNC: 6.8 G/DL (ref 6.4–8.2)
SODIUM SERPL-SCNC: 143 MMOL/L (ref 136–145)
TRIGL SERPL-MCNC: 77 MG/DL (ref ?–150)
TSH SERPL DL<=0.05 MIU/L-ACNC: 3.69 UIU/ML (ref 0.36–3.74)
VLDLC SERPL CALC-MCNC: 15.4 MG/DL

## 2022-12-14 PROCEDURE — 3078F DIAST BP <80 MM HG: CPT | Performed by: FAMILY MEDICINE

## 2022-12-14 PROCEDURE — G8754 DIAS BP LESS 90: HCPCS | Performed by: FAMILY MEDICINE

## 2022-12-14 PROCEDURE — G8420 CALC BMI NORM PARAMETERS: HCPCS | Performed by: FAMILY MEDICINE

## 2022-12-14 PROCEDURE — 1101F PT FALLS ASSESS-DOCD LE1/YR: CPT | Performed by: FAMILY MEDICINE

## 2022-12-14 PROCEDURE — G0463 HOSPITAL OUTPT CLINIC VISIT: HCPCS | Performed by: FAMILY MEDICINE

## 2022-12-14 PROCEDURE — 3074F SYST BP LT 130 MM HG: CPT | Performed by: FAMILY MEDICINE

## 2022-12-14 PROCEDURE — 36415 COLL VENOUS BLD VENIPUNCTURE: CPT

## 2022-12-14 PROCEDURE — 80053 COMPREHEN METABOLIC PANEL: CPT

## 2022-12-14 PROCEDURE — G8510 SCR DEP NEG, NO PLAN REQD: HCPCS | Performed by: FAMILY MEDICINE

## 2022-12-14 PROCEDURE — G0439 PPPS, SUBSEQ VISIT: HCPCS | Performed by: FAMILY MEDICINE

## 2022-12-14 PROCEDURE — 80061 LIPID PANEL: CPT

## 2022-12-14 PROCEDURE — 99214 OFFICE O/P EST MOD 30 MIN: CPT | Performed by: FAMILY MEDICINE

## 2022-12-14 PROCEDURE — G8536 NO DOC ELDER MAL SCRN: HCPCS | Performed by: FAMILY MEDICINE

## 2022-12-14 PROCEDURE — 1123F ACP DISCUSS/DSCN MKR DOCD: CPT | Performed by: FAMILY MEDICINE

## 2022-12-14 PROCEDURE — G8752 SYS BP LESS 140: HCPCS | Performed by: FAMILY MEDICINE

## 2022-12-14 PROCEDURE — G8427 DOCREV CUR MEDS BY ELIG CLIN: HCPCS | Performed by: FAMILY MEDICINE

## 2022-12-14 PROCEDURE — 84443 ASSAY THYROID STIM HORMONE: CPT

## 2022-12-14 NOTE — PROGRESS NOTES
This is the Subsequent Medicare Annual Wellness Exam, performed 12 months or more after the Initial AWV or the last Subsequent AWV    I have reviewed the patient's medical history in detail and updated the computerized patient record. History     Past Medical History:   Diagnosis Date    Cancer ([address] 2012    throat    Gout     Hx of colonoscopy with polypectomy 11/15/2016    rpt 5 years 2021, dr. Micheline Pulido    Hypertension     Pancreatitis 1989    Thyroid disease       Past Surgical History:   Procedure Laterality Date    ABDOMEN SURGERY PROC UNLISTED      HX COLONOSCOPY  11/15/2016    repeat 5 years 2021, Dr Tacho Cates polyp       Current Outpatient Medications:     valsartan (DIOVAN) 80 mg tablet, TAKE ONE TABLET BY MOUTH DAILY, Disp: 90 Tablet, Rfl: 1    potassium chloride (K-DUR, KLOR-CON M20) 20 mEq tablet, Take 1 Tablet by mouth two (2) times a day., Disp: 30 Tablet, Rfl: 0    felodipine (PLENDIL SR) 5 mg 24 hr tablet, Take 1 Tablet by mouth daily. , Disp: 90 Tablet, Rfl: 1    hydrALAZINE (APRESOLINE) 50 mg tablet, Take 1 Tablet by mouth three (3) times daily. , Disp: 270 Tablet, Rfl: 3    levothyroxine (SYNTHROID) 150 mcg tablet, Take 1 Tablet by mouth Daily (before breakfast). , Disp: 90 Tablet, Rfl: 3    carvediloL (Coreg) 25 mg tablet, Take 1 Tablet by mouth two (2) times daily (with meals). , Disp: 180 Tablet, Rfl: 1    lovastatin (MEVACOR) 20 mg tablet, Take 2 Tabs by mouth nightly., Disp: 180 Tab, Rfl: 3    sildenafil citrate (Viagra) 50 mg tablet, Take 1 Tablet by mouth as needed for Erectile Dysfunction. , Disp: 12 Tablet, Rfl: 5    No Known Allergies  Family History   Problem Relation Age of Onset    Cancer Father      Social History     Tobacco Use    Smoking status: Current Every Day Smoker     Packs/day: 0.25     Years: 50.00     Pack years: 12.50    Smokeless tobacco: Never Used   Substance Use Topics    Alcohol use: Yes     Comment: 3     Patient Active Problem List   Diagnosis Code    Essential hypertension I10    Acquired hypothyroidism E03.9    Tobacco use Z72.0    CKD (chronic kidney disease) stage 3, GFR 30-59 ml/min N18.30       Depression Risk Factor Screening:     3 most recent PHQ Screens 4/9/2020   Little interest or pleasure in doing things Not at all   Feeling down, depressed, irritable, or hopeless Not at all   Total Score PHQ 2 0     Alcohol Risk Factor Screening: You do not drink alcohol or very rarely. Functional Ability and Level of Safety:   Hearing Loss  Hearing is good. Activities of Daily Living  The home contains: no safety equipment. Patient does total self care    Fall Risk  Fall Risk Assessment, last 12 mths 4/9/2020   Able to walk? Yes   Fall in past 12 months? No       Abuse Screen  Patient is not abused    Cognitive Screening   Evaluation of Cognitive Function:  Has your family/caregiver stated any concerns about your memory: no      Patient Care Team   Patient Care Team:  Faith Denney MD as PCP - General (Family Medicine)  Faith Denney MD as PCP - [facility] Empaneled Provider  Edd Hu MD (Gastroenterology)  DEBRA Meléndez (Optometry)  Angela Pitts DO (Dermatology)    Assessment/Plan   Education and counseling provided:  Are appropriate based on today's review and evaluation  Pneumococcal Vaccine- 13 and 23 completed  Influenza Vaccine- annually, advised to get in local pharmacy, pt declines   Cardiovascular screening blood test- due order placed  Screening for glaucoma-due, pt reminded        Health Maintenance Due   Topic Date Due    DTaP/Tdap/Td series (1 - Tdap) 11/08/1966    Shingrix Vaccine Age 50> (1 of 2) 11/08/1995    GLAUCOMA SCREENING Q2Y  04/23/2020    Medicare Yearly Exam  05/03/2020    Flu Vaccine (1) 09/01/2020    Lipid Screen  09/18/2020         Anisha Narayan, 68 y.o.,  male    SUBJECTIVE  Ff-up      HTN- Continues to take diovan 80, felodipine 5,   hydralazine dose to 50 mg tid and coreg 25 mg bid. Pt with h/o CKD.  He has recurrent rash now seeing dr. Tonny Jones whom he reports does not think reaction to any of previous antihypertensives norvasc or lisinopril. HL-on mevacor    He is a chronic smoker, about 5 cig/ day. aaa screen neg. Old granulomatous disease on repeat CXR negative. Hypothyroidism-reports  Compliant with 150 mcgs synthroid/day. Reviewed normal TSH 2/2021    Remote h/o throat ca s/p radiation/chemotherapy 2003    ED- on prn viagra  ROS:  See HPI, all others negative        Patient Active Problem List   Diagnosis Code    Essential hypertension I10    Acquired hypothyroidism E03.9    Tobacco use Z72.0    Stage 3a chronic kidney disease (HCC) N18.31    Abnormal stress test R94.39    Cardiomyopathy (HCC) I42.9       Current Outpatient Medications   Medication Sig Dispense Refill    valsartan (DIOVAN) 80 mg tablet TAKE ONE TABLET BY MOUTH DAILY 90 Tablet 1    potassium chloride (K-DUR, KLOR-CON M20) 20 mEq tablet Take 1 Tablet by mouth two (2) times a day. 30 Tablet 0    felodipine (PLENDIL SR) 5 mg 24 hr tablet Take 1 Tablet by mouth daily. 90 Tablet 1    hydrALAZINE (APRESOLINE) 50 mg tablet Take 1 Tablet by mouth three (3) times daily. 270 Tablet 3    levothyroxine (SYNTHROID) 150 mcg tablet Take 1 Tablet by mouth Daily (before breakfast). 90 Tablet 3    carvediloL (Coreg) 25 mg tablet Take 1 Tablet by mouth two (2) times daily (with meals). 180 Tablet 1    lovastatin (MEVACOR) 20 mg tablet Take 2 Tabs by mouth nightly. 180 Tab 3    sildenafil citrate (Viagra) 50 mg tablet Take 1 Tablet by mouth as needed for Erectile Dysfunction.  12 Tablet 5       Allergies   Allergen Reactions    Lisinopril Rash    Norvasc [Amlodipine] Itching       Past Medical History:   Diagnosis Date    Cancer ([address] [address] 2012    throat    Gout     Hx of colonoscopy with polypectomy 11/15/2016    rpt 5 years 2021, dr. Rebecca Younger    Hypertension     Pancreatitis 1989    Thyroid disease        Social History     Socioeconomic History    Marital status:      Spouse name: Not on file    Number of children: Not on file    Years of education: Not on file    Highest education level: Not on file   Occupational History    Not on file   Tobacco Use    Smoking status: Every Day     Packs/day: 0.25     Years: 50.00     Pack years: 12.50     Types: Cigarettes    Smokeless tobacco: Never   Vaping Use    Vaping Use: Never used   Substance and Sexual Activity    Alcohol use: Yes     Comment: 3    Drug use: No    Sexual activity: Not Currently     Partners: Female   Other Topics Concern    Not on file   Social History Narrative    Not on file     Social Determinants of Health     Financial Resource Strain: High Risk    Difficulty of Paying Living Expenses: Hard   Food Insecurity: Food Insecurity Present    Worried About Running Out of Food in the Last Year: Sometimes true    Ran Out of Food in the Last Year: Sometimes true   Transportation Needs: Not on file   Physical Activity: Not on file   Stress: Not on file   Social Connections: Not on file   Intimate Partner Violence: Not on file   Housing Stability: Not on file       Family History   Problem Relation Age of Onset    Cancer Father          OBJECTIVE    Physical Exam:     Visit Vitals  /70 (BP 1 Location: Right arm, BP Patient Position: Sitting, BP Cuff Size: Large adult)   Pulse (!) 59   Temp 97.8 °F (36.6 °C) (Temporal)   Resp 16   Ht 5' 6\" (1.676 m)   Wt 145 lb (65.8 kg)   SpO2 97%   BMI 23.40 kg/m²       General: alert, elderly, AA, in no apparent distress or pain  Head: atraumatic.  Non-tender maxillary and frontal sinuses  Neck: supple, no adenopathy palpated  CVS: normal rate, regular rhythm, distinct S1 and S2  Lungs:clear to ausculation bilaterally, no crackles, wheezing or rhonchi noted  Abdomen: normoactive bowel sounds, soft, non-tender  Extremities: no edema, no cyanosis, MSK grossly normal  Skin: warm, no lesions, rashes noted  Psych:  mood and affect normal  Results for orders placed or performed during the hospital encounter of 29/92/21   METABOLIC PANEL, BASIC   Result Value Ref Range    Sodium 146 (H) 136 - 145 mmol/L    Potassium 4.2 3.5 - 5.5 mmol/L    Chloride 116 (H) 100 - 111 mmol/L    CO2 24 21 - 32 mmol/L    Anion gap 6 3.0 - 18 mmol/L    Glucose 77 74 - 99 mg/dL    BUN 17 7.0 - 18 MG/DL    Creatinine 1.72 (H) 0.6 - 1.3 MG/DL    BUN/Creatinine ratio 10 (L) 12 - 20      GFR est AA 47 (L) >60 ml/min/1.73m2    GFR est non-AA 39 (L) >60 ml/min/1.73m2    Calcium 8.7 8.5 - 10.1 MG/DL           ASSESSMENT/PLAN  Diagnoses and all orders for this visit:    1. Essential hypertension   controlled  cont felodipine 5 mg  Cont coreg 25 mg bid  Cont hydralazine 50 mg tid  Cont diovan  Reminded on standing order for BMP soon. ? allergy to norvasc/lisinopril, per derm dr. Ramu Davila not a true allergy  Cont  mevacor  Cmp/lipid panel prior to next visit  DASH Diet    2. Acquired hypothyroidism  Stable  Cont 150 mcgs synthroid/day  tsh prior to next visit     3. Tobacco use  Discussed complete cessation    4. Stage 3a chronic kidney disease  Monitoring    5. Chest pain, unspecified type  Atypical  Stress test/echo 2/2021 normal  Following cardiology    6. Pure hypercholesterolemia  Cont mevacor    7. Erectile dysfunction, unspecified  Cont prn viagra    Follow-up and Dispositions    Return in about 3 months (around 3/14/2023) for routine care, CMP lab today, and all other fasting labs to be completed 1 week prior . Patient understands plan of care. Patient has provided input and agrees with goals.

## 2022-12-15 DIAGNOSIS — N18.31 STAGE 3A CHRONIC KIDNEY DISEASE (HCC): Primary | ICD-10-CM

## 2022-12-15 NOTE — PROGRESS NOTES
Thyroid level normal range- cont current dose  Cholesterol slightly higher but still fairly good- cont mevacor  Kidney function- some improvement, will cont to monitor  Non fasting labs CMP prior to next visit order placed, pls inform pt

## 2022-12-27 NOTE — PROGRESS NOTES
Patient identified with 2 identifiers (name and ).   Patient aware of Thyroid level normal range- cont current dose  Cholesterol slightly higher but still fairly good- cont mevacor  Kidney function- some improvement, will cont to monitor  Non fasting labs CMP prior to next visit order placed

## 2023-01-06 NOTE — TELEPHONE ENCOUNTER
This patient contacted office for the following prescriptions to be filled:    Medication requested :   Requested Prescriptions     Pending Prescriptions Disp Refills    felodipine (PLENDIL SR) 5 mg 24 hr tablet 90 Tablet 1     Sig: Take 1 Tablet by mouth daily.       PCP: Neno Serrano, [address] [place] [address] or Print: Vinicio Terry [phone]  Mail order or Local pharmacy: [address] [place] [address]    Scheduled appointment if not seen by current providers in office: [address] [place] [address] 12-, Drake Jewels 03-

## 2023-01-09 RX ORDER — FELODIPINE 5 MG/1
5 TABLET, EXTENDED RELEASE ORAL DAILY
Qty: 90 TABLET | Refills: 1 | Status: SHIPPED | OUTPATIENT
Start: 2023-01-09

## 2023-02-20 ENCOUNTER — OFFICE VISIT (OUTPATIENT)
Age: 78
End: 2023-02-20
Payer: MEDICARE

## 2023-02-20 VITALS
SYSTOLIC BLOOD PRESSURE: 162 MMHG | HEART RATE: 52 BPM | DIASTOLIC BLOOD PRESSURE: 72 MMHG | WEIGHT: 149 LBS | HEIGHT: 66 IN | BODY MASS INDEX: 23.95 KG/M2 | OXYGEN SATURATION: 99 %

## 2023-02-20 DIAGNOSIS — N18.31 CHRONIC KIDNEY DISEASE, STAGE 3A (HCC): ICD-10-CM

## 2023-02-20 DIAGNOSIS — I10 ESSENTIAL (PRIMARY) HYPERTENSION: Primary | ICD-10-CM

## 2023-02-20 DIAGNOSIS — E78.5 HYPERLIPIDEMIA, UNSPECIFIED HYPERLIPIDEMIA TYPE: ICD-10-CM

## 2023-02-20 DIAGNOSIS — I42.2 OTHER HYPERTROPHIC CARDIOMYOPATHY (HCC): ICD-10-CM

## 2023-02-20 PROCEDURE — 99214 OFFICE O/P EST MOD 30 MIN: CPT | Performed by: INTERNAL MEDICINE

## 2023-02-20 PROCEDURE — 1123F ACP DISCUSS/DSCN MKR DOCD: CPT | Performed by: INTERNAL MEDICINE

## 2023-02-20 PROCEDURE — G8428 CUR MEDS NOT DOCUMENT: HCPCS | Performed by: INTERNAL MEDICINE

## 2023-02-20 PROCEDURE — 3077F SYST BP >= 140 MM HG: CPT | Performed by: INTERNAL MEDICINE

## 2023-02-20 PROCEDURE — G8484 FLU IMMUNIZE NO ADMIN: HCPCS | Performed by: INTERNAL MEDICINE

## 2023-02-20 PROCEDURE — 4004F PT TOBACCO SCREEN RCVD TLK: CPT | Performed by: INTERNAL MEDICINE

## 2023-02-20 PROCEDURE — G8420 CALC BMI NORM PARAMETERS: HCPCS | Performed by: INTERNAL MEDICINE

## 2023-02-20 PROCEDURE — 3078F DIAST BP <80 MM HG: CPT | Performed by: INTERNAL MEDICINE

## 2023-02-20 RX ORDER — FELODIPINE 10 MG/1
10 TABLET, EXTENDED RELEASE ORAL DAILY
Qty: 90 TABLET | Refills: 3 | Status: SHIPPED | OUTPATIENT
Start: 2023-02-20

## 2023-02-20 ASSESSMENT — PATIENT HEALTH QUESTIONNAIRE - PHQ9
SUM OF ALL RESPONSES TO PHQ QUESTIONS 1-9: 0
1. LITTLE INTEREST OR PLEASURE IN DOING THINGS: 0
2. FEELING DOWN, DEPRESSED OR HOPELESS: 0
DEPRESSION UNABLE TO ASSESS: FUNCTIONAL CAPACITY MOTIVATION LIMITS ACCURACY
SUM OF ALL RESPONSES TO PHQ9 QUESTIONS 1 & 2: 0
SUM OF ALL RESPONSES TO PHQ QUESTIONS 1-9: 0

## 2023-02-20 NOTE — PROGRESS NOTES
HISTORY OF PRESENT ILLNESS  Ambar Farnsworth is a 68 y.o. male. 2/12/2021  Patient seen today for new patient evaluation. He is referred here for evaluation of chest pain. Patient was in emergency room May 2020 with episodes of chest pain. Patient sharp pain in the chest at that time. Since that time he has not had any recurrence of chest pain. He denies any shortness of breath, dizziness palpitation or other symptom. Patient subsequent stress test in 12/2020 that was reported abnormal due to low ejection fraction. It was thought to be under calculation possibly from LVH. Perfusion was reported normal.  3/16/2021  Patient presents to f/u for HTN and echo results. He reports Coreg recently increased to 25 mg /BID by PCP, and b/p remains elevated. Review of Systems   Constitutional:  Negative for chills and fever. HENT:  Negative for nosebleeds. Eyes:  Negative for blurred vision and double vision. Respiratory:  Negative for cough, hemoptysis, sputum production, shortness of breath and wheezing. Cardiovascular:  Negative for chest pain, palpitations, orthopnea, claudication, leg swelling and PND. Gastrointestinal:  Negative for abdominal pain, heartburn, nausea and vomiting. Musculoskeletal:  Negative for myalgias. Skin:  Negative for rash. Neurological:  Negative for dizziness, weakness and headaches. Endo/Heme/Allergies:  Does not bruise/bleed easily.        Family History   Problem Relation Age of Onset    Cancer Father        Past Medical History:   Diagnosis Date    Cancer ([facility] [address] 2012    throat    Gout     Hx of colonoscopy with polypectomy 11/15/2016    rpt 5 years 2021, dr. Elizabeth Hallman    Hypertension     Pancreatitis 1989    Thyroid disease        Past Surgical History:   Procedure Laterality Date    COLONOSCOPY  11/15/2016    repeat 5 years 2021, Dr Arechiga Fruits History     Tobacco Use    Smoking status: Every Day     Packs/day: 0.25     Types: Cigarettes    Smokeless tobacco: Never   Substance Use Topics    Alcohol use: Yes       Allergies   Allergen Reactions    Amlodipine Itching    Lisinopril Rash       Prior to Admission medications    Medication Sig Start Date End Date Taking? Authorizing Provider   felodipine (PLENDIL) 10 MG extended release tablet Take 1 tablet by mouth daily 2/20/23  Yes Alireza Urrutia MD   carvedilol (COREG) 25 MG tablet Take 25 mg by mouth 2 times daily (with meals) 2/11/22  Yes Ar Automatic Reconciliation   hydrALAZINE (APRESOLINE) 50 MG tablet Take 50 mg by mouth 3 times daily 5/27/22  Yes Ar Automatic Reconciliation   levothyroxine (SYNTHROID) 150 MCG tablet Take 150 mcg by mouth every morning (before breakfast) 4/28/22  Yes Ar Automatic Reconciliation   lovastatin (MEVACOR) 20 MG tablet Take 40 mg by mouth 2/11/21  Yes Ar Automatic Reconciliation   potassium chloride (KLOR-CON M) 20 MEQ extended release tablet Take 20 mEq by mouth 2 times daily 7/18/22  Yes Ar Automatic Reconciliation   sildenafil (VIAGRA) 50 MG tablet Take 50 mg by mouth as needed 3/14/22  Yes Ar Automatic Reconciliation   valsartan (DIOVAN) 80 MG tablet TAKE ONE TABLET BY MOUTH DAILY 9/19/22  Yes Ar Automatic Reconciliation         BP (!) 162/72 (Site: Left Upper Arm, Position: Sitting, Cuff Size: Medium Adult)   Pulse 52   Ht 5' 6\" (1.676 m)   Wt 149 lb (67.6 kg)   SpO2 99%   BMI 24.05 kg/m²     Physical Exam    Mr. Riley Jasso has a reminder for a \"due or due soon\" health maintenance. I have asked that he contact his primary care provider for follow-up on this health maintenance. No flowsheet data found. I have personally reviewed patient's records available from hospital and other providers and incorporated findings in patient care. Notes, lab, EKG, stress test, chest x-ray  Procedure Conclusion 12/2020    Nuclear Stress Test    Nuclear Cardiac Spect Rest then Gated Stress study.  [facility] was used as the stressing method and agent. (Jolaine Pica given via a 10 - 20 sec injection.)   One day myocardial perfusion study. Date: 12/9/2020. Left ventricular perfusion is normal. Myocardial perfusion imaging supports an intermediate risk stress test.   There is no prior study available for comparison. .   Interpretation Summary 12/2020    Baseline ECG: Sinus bradycardia. LVH with repolorization; possible left atrial enlargement  Gated SPECT: Left ventricular function post-stress was abnormal. Calculated ejection fraction is 47%. There is no evidence of transient ischemic dilation (TID). The TID ratio is 1.17. Left ventricular perfusion is normal, no ischemia or previous infarct. Myocardial perfusion imaging supports an intermediate risk stress test due to calculated EF of 47%. Significant LVH present, possibly leading to underestimation of EF. Clinical correlation recommended. Echo 3/4/2021  Interpretation Summary  LV: Estimated LVEF is 60 - 65%. Normal cavity size and systolic function (ejection fraction normal). Severe apical hypertrophy. Moderate septal wall hypertrophy. Moderate posterior wall hypertrophy. Wall motion: normal. Moderate (grade 2) left ventricular diastolic dysfunction. LA: Left Atrium volume index is 30.05 mL/m2. RV: Normal right ventricular size and function. MV: Mild mitral valve regurgitation is present. IMPRESSION chest x-ray 12/2020    No acute pulmonic disease. Probable old granulomatous disease. I Have personally reviewed recent relevant labs available and discussed with patient  12/2022-CMP, lipid  Mr. Balaji Rico has a reminder for a \"due or due soon\" health maintenance. I have asked that he contact his primary care provider for follow-up on this health maintenance. No flowsheet data found. Assessment        Diagnosis Orders   1. Essential (primary) hypertension      Stable continue treatment monitor      2.  Other hypertrophic cardiomyopathy (HCC)      Symptom continue treatment      3. Chronic kidney disease, stage 3a ([facility] [address]      Continue monitoring follow-up with PCP stable      4. Hyperlipidemia, unspecified hyperlipidemia type      Continue treatment follow lab with PCP          Medications Discontinued During This Encounter   Medication Reason    felodipine (PLENDIL) 5 MG extended release tablet          Follow-up and Dispositions    Return in about 6 months (around 8/20/2023). 2/2021  Seen for low ejection fraction reported on nuclear scan. History of severe hypertension uncontrolled. Increase hydralazine and Coreg. Has side effect with lisinopril and amlodipine which are off. Check echo for LV function. Continue with other therapy  3/2021  Echo revealed EF 60-65% - with severe apical hypertrophy likely due to hypertensive heart disease. Blood pressure is uncontrolled. Will begin Valsartan 80 mg/ Day   Check BMP in 1 week and follow home chart if able. Encouraged smoking cessation   6/2021  Cardiac status stable. Hypertension much better controlled. Renal function stable continue current medical management  1/2022  Cardiac status stable. Blood pressure controlled. Labs reviewed continue treatment  8/2022  Stable cardiac status continue current medical management and monitor  There are no discontinued medications. 2/2023  Blood pressure elevated. Increase felodipine to 10 mg a day. I have not increase valsartan due to CKD.   Otherwise stable cardiac status continue current medical management monitor

## 2023-03-13 ENCOUNTER — HOSPITAL ENCOUNTER (OUTPATIENT)
Facility: HOSPITAL | Age: 78
Discharge: HOME | End: 2023-03-16
Payer: MEDICARE

## 2023-03-13 LAB
ALBUMIN SERPL-MCNC: 3.2 G/DL (ref 3.4–5)
ALBUMIN/GLOB SERPL: 1 (ref 0.8–1.7)
ALP SERPL-CCNC: 97 U/L (ref 45–117)
ALT SERPL-CCNC: 14 U/L (ref 16–61)
ANION GAP SERPL CALC-SCNC: 4 MMOL/L (ref 3–18)
AST SERPL-CCNC: 16 U/L (ref 10–38)
BILIRUB SERPL-MCNC: 0.5 MG/DL (ref 0.2–1)
BUN SERPL-MCNC: 23 MG/DL (ref 7–18)
BUN/CREAT SERPL: 15 (ref 12–20)
CALCIUM SERPL-MCNC: 8.7 MG/DL (ref 8.5–10.1)
CHLORIDE SERPL-SCNC: 113 MMOL/L (ref 100–111)
CHOLEST SERPL-MCNC: 155 MG/DL
CO2 SERPL-SCNC: 25 MMOL/L (ref 21–32)
CREAT SERPL-MCNC: 1.56 MG/DL (ref 0.6–1.3)
GLOBULIN SER CALC-MCNC: 3.3 G/DL (ref 2–4)
GLUCOSE SERPL-MCNC: 95 MG/DL (ref 74–99)
HDLC SERPL-MCNC: 77 MG/DL (ref 40–60)
HDLC SERPL: 2 (ref 0–5)
LDLC SERPL CALC-MCNC: 57.4 MG/DL (ref 0–100)
LIPID PANEL: ABNORMAL
POTASSIUM SERPL-SCNC: 4.1 MMOL/L (ref 3.5–5.5)
PROT SERPL-MCNC: 6.5 G/DL (ref 6.4–8.2)
SODIUM SERPL-SCNC: 142 MMOL/L (ref 136–145)
TRIGL SERPL-MCNC: 103 MG/DL
TSH SERPL DL<=0.05 MIU/L-ACNC: 0.84 UIU/ML (ref 0.36–3.74)
VLDLC SERPL CALC-MCNC: 20.6 MG/DL

## 2023-03-13 PROCEDURE — 84443 ASSAY THYROID STIM HORMONE: CPT

## 2023-03-13 PROCEDURE — 80053 COMPREHEN METABOLIC PANEL: CPT

## 2023-03-13 PROCEDURE — 36415 COLL VENOUS BLD VENIPUNCTURE: CPT

## 2023-03-13 PROCEDURE — 80061 LIPID PANEL: CPT

## 2023-03-15 RX ORDER — VALSARTAN 80 MG/1
TABLET ORAL
Qty: 90 TABLET | Refills: 1 | Status: SHIPPED | OUTPATIENT
Start: 2023-03-15

## 2023-03-15 NOTE — TELEPHONE ENCOUNTER
This patient contacted office for the following prescriptions to be filled:    Medication requested : valsartan (DIOVAN) 80 MG tablet       QTY 90  PCP: [address] or Print: [place]  Mail order or Local pharmacy [address]     Scheduled appointment if not seen by current providers in office: LOV 12/14/2022 TIFFANIE 3/21/2023

## 2023-03-21 ENCOUNTER — OFFICE VISIT (OUTPATIENT)
Age: 78
End: 2023-03-21
Payer: MEDICARE

## 2023-03-21 VITALS
BODY MASS INDEX: 23.63 KG/M2 | HEIGHT: 66 IN | OXYGEN SATURATION: 97 % | TEMPERATURE: 98.7 F | SYSTOLIC BLOOD PRESSURE: 138 MMHG | HEART RATE: 66 BPM | WEIGHT: 147 LBS | DIASTOLIC BLOOD PRESSURE: 82 MMHG | RESPIRATION RATE: 16 BRPM

## 2023-03-21 DIAGNOSIS — E78.00 PURE HYPERCHOLESTEROLEMIA: ICD-10-CM

## 2023-03-21 DIAGNOSIS — N18.31 STAGE 3A CHRONIC KIDNEY DISEASE (HCC): ICD-10-CM

## 2023-03-21 DIAGNOSIS — E03.9 ACQUIRED HYPOTHYROIDISM: Primary | ICD-10-CM

## 2023-03-21 DIAGNOSIS — Z72.0 TOBACCO USE: ICD-10-CM

## 2023-03-21 DIAGNOSIS — I10 ESSENTIAL HYPERTENSION: ICD-10-CM

## 2023-03-21 DIAGNOSIS — R07.89 ATYPICAL CHEST PAIN: ICD-10-CM

## 2023-03-21 PROBLEM — J84.10 PULMONARY FIBROSIS, UNSPECIFIED (HCC): Status: RESOLVED | Noted: 2023-03-21 | Resolved: 2023-03-21

## 2023-03-21 PROBLEM — J84.10 PULMONARY FIBROSIS, UNSPECIFIED (HCC): Status: ACTIVE | Noted: 2023-03-21

## 2023-03-21 PROCEDURE — 3075F SYST BP GE 130 - 139MM HG: CPT | Performed by: FAMILY MEDICINE

## 2023-03-21 PROCEDURE — G8484 FLU IMMUNIZE NO ADMIN: HCPCS | Performed by: FAMILY MEDICINE

## 2023-03-21 PROCEDURE — 4004F PT TOBACCO SCREEN RCVD TLK: CPT | Performed by: FAMILY MEDICINE

## 2023-03-21 PROCEDURE — G8420 CALC BMI NORM PARAMETERS: HCPCS | Performed by: FAMILY MEDICINE

## 2023-03-21 PROCEDURE — 1123F ACP DISCUSS/DSCN MKR DOCD: CPT | Performed by: FAMILY MEDICINE

## 2023-03-21 PROCEDURE — G8427 DOCREV CUR MEDS BY ELIG CLIN: HCPCS | Performed by: FAMILY MEDICINE

## 2023-03-21 PROCEDURE — 99214 OFFICE O/P EST MOD 30 MIN: CPT | Performed by: FAMILY MEDICINE

## 2023-03-21 PROCEDURE — 3079F DIAST BP 80-89 MM HG: CPT | Performed by: FAMILY MEDICINE

## 2023-03-21 SDOH — ECONOMIC STABILITY: FOOD INSECURITY: WITHIN THE PAST 12 MONTHS, THE FOOD YOU BOUGHT JUST DIDN'T LAST AND YOU DIDN'T HAVE MONEY TO GET MORE.: OFTEN TRUE

## 2023-03-21 SDOH — ECONOMIC STABILITY: HOUSING INSECURITY
IN THE LAST 12 MONTHS, WAS THERE A TIME WHEN YOU DID NOT HAVE A STEADY PLACE TO SLEEP OR SLEPT IN A SHELTER (INCLUDING NOW)?: NO

## 2023-03-21 SDOH — ECONOMIC STABILITY: INCOME INSECURITY: HOW HARD IS IT FOR YOU TO PAY FOR THE VERY BASICS LIKE FOOD, HOUSING, MEDICAL CARE, AND HEATING?: VERY HARD

## 2023-03-21 SDOH — ECONOMIC STABILITY: FOOD INSECURITY: WITHIN THE PAST 12 MONTHS, YOU WORRIED THAT YOUR FOOD WOULD RUN OUT BEFORE YOU GOT MONEY TO BUY MORE.: OFTEN TRUE

## 2023-03-21 ASSESSMENT — PATIENT HEALTH QUESTIONNAIRE - PHQ9
4. FEELING TIRED OR HAVING LITTLE ENERGY: 0
DEPRESSION UNABLE TO ASSESS: FUNCTIONAL CAPACITY MOTIVATION LIMITS ACCURACY
9. THOUGHTS THAT YOU WOULD BE BETTER OFF DEAD, OR OF HURTING YOURSELF: 0
3. TROUBLE FALLING OR STAYING ASLEEP: 0
7. TROUBLE CONCENTRATING ON THINGS, SUCH AS READING THE NEWSPAPER OR WATCHING TELEVISION: 0
5. POOR APPETITE OR OVEREATING: 0
6. FEELING BAD ABOUT YOURSELF - OR THAT YOU ARE A FAILURE OR HAVE LET YOURSELF OR YOUR FAMILY DOWN: 0
1. LITTLE INTEREST OR PLEASURE IN DOING THINGS: 0
2. FEELING DOWN, DEPRESSED OR HOPELESS: 0
8. MOVING OR SPEAKING SO SLOWLY THAT OTHER PEOPLE COULD HAVE NOTICED. OR THE OPPOSITE, BEING SO FIGETY OR RESTLESS THAT YOU HAVE BEEN MOVING AROUND A LOT MORE THAN USUAL: 0
SUM OF ALL RESPONSES TO PHQ QUESTIONS 1-9: 0
SUM OF ALL RESPONSES TO PHQ QUESTIONS 1-9: 0
SUM OF ALL RESPONSES TO PHQ9 QUESTIONS 1 & 2: 0
SUM OF ALL RESPONSES TO PHQ QUESTIONS 1-9: 0
10. IF YOU CHECKED OFF ANY PROBLEMS, HOW DIFFICULT HAVE THESE PROBLEMS MADE IT FOR YOU TO DO YOUR WORK, TAKE CARE OF THINGS AT HOME, OR GET ALONG WITH OTHER PEOPLE: 0
SUM OF ALL RESPONSES TO PHQ QUESTIONS 1-9: 0

## 2023-03-21 NOTE — PROGRESS NOTES
Chief Complaint   Patient presents with    Cholesterol Problem    Chronic Kidney Disease    Erectile Dysfunction    Hypertension    Hypothyroidism    Results     Review of results         1. \"Have you been to the ER, urgent care clinic since your last visit? Hospitalized since your last visit? \" No    2. \"Have you seen or consulted any other health care providers outside of the [address] [place] [address] since your last visit? \" No     3. For patients aged 39-70: Has the patient had a colonoscopy / FIT/ Cologuard? Yes - Care Gap present. Most recent result on file   If the patient is female:    4. For patients aged 41-77: Has the patient had a mammogram within the past 2 years? NA - based on age or sex      11. For patients aged 21-65: Has the patient had a pap smear?  NA - based on age or sex
with polypectomy 11/15/2016    [facility] 5 years 2021, dr. Valerie Cochran    Hypertension     Pancreatitis 1989    Thyroid disease        Social History     Socioeconomic History    Marital status:      Spouse name: Not on file    Number of children: Not on file    Years of education: Not on file    Highest education level: Not on file   Occupational History    Not on file   Tobacco Use    Smoking status: Every Day     Packs/day: 0.25     Years: 50.00     Pack years: 12.50     Types: Cigarettes    Smokeless tobacco: Never   Vaping Use    Vaping Use: Never used   Substance and Sexual Activity    Alcohol use: Yes     Comment: 3    Drug use: No    Sexual activity: Not Currently     Partners: Female   Other Topics Concern    Not on file   Social History Narrative    Not on file     Social Determinants of Health     Financial Resource Strain: High Risk    Difficulty of Paying Living Expenses: Hard   Food Insecurity: Food Insecurity Present    Worried About Running Out of Food in the Last Year: Sometimes true    Ran Out of Food in the Last Year: Sometimes true   Transportation Needs: Not on file   Physical Activity: Not on file   Stress: Not on file   Social Connections: Not on file   Intimate Partner Violence: Not on file   Housing Stability: Not on file       Family History   Problem Relation Age of Onset    Cancer Father          OBJECTIVE    Physical Exam:     /82 (Site: Left Upper Arm, Position: Sitting, Cuff Size: Large Adult)   Pulse 66   Temp 98.7 °F (37.1 °C) (Temporal)   Resp 16   Ht 5' 6\" (1.676 m)   Wt 147 lb (66.7 kg)   SpO2 97%   BMI 23.73 kg/m²         General: alert, elderly, AA, in no apparent distress or pain  Head: atraumatic.  Non-tender maxillary and frontal sinuses  Neck: supple, no adenopathy palpated  CVS: normal rate, regular rhythm, distinct S1 and S2  Lungs:clear to ausculation bilaterally, no crackles, wheezing or rhonchi noted  Abdomen: normoactive bowel sounds, soft,

## 2023-03-21 NOTE — PATIENT INSTRUCTIONS
FINANCIAL RESOURCES    [facility] Financial Assistance  What they offer: The [employer] Program helps uninsured patients who do not qualify for government-sponsored health insurance and cannot afford to pay for their medical care. Insured patient may also qualify for assistance based on family income, family size, and medical needs. Phone Number: [phone]  How to apply for the [employer] Program:            #    Option 1: To apply for financial assistance, a patient (or their family or other provider) should fill out the Financial Assistance Application. Copies of the Financial Assistance Application and the FAP may be obtained for free by calling the [facility] customer service department at [phone]. #    Option 2:  The Financial Assistance Application and policy may be obtained for free by downloading a copy from the [employer]:                     Sim[employer] [email or web address]/financial-assistance                     #   Applications are available in several languages on the website    The [place]. [employer]. [employer]  What they offer: The [employer] Program can assist you if youre uninsured and have been diagnosed with chronic, debilitating, or life-threatening disease. Phone Number: 9-403.772.2224  Website: www. [employer]    UTILITIES    [employer]  What they offer: Partnership with the [place] [address]. Assist with   finding and applying for government funded programs and benefits. You can also update  your benefits or report changes through [employer].   Website: [employer]  Phone Number: 448-7KMBJXN ([phone])      [address] they offer: Prashanth Stinson is [facility] [employer] energy assistance program of last resort for                 anyone who faces financial hardships from unemployment or family

## 2023-04-24 NOTE — TELEPHONE ENCOUNTER
This patient contacted office for the following prescriptions to be filled:    Medication requested : levothyroxine (SYNTHROID) 150 MCG tablet        Qty 90  [address] [address] or Print: Pasquale Pearson,  Mail order or Local pharmacy JOSE Orellana    Scheduled appointment if not seen by current providers in office: LOV 3/21/2023 TIFFANIE 6/22/2023

## 2023-04-25 RX ORDER — LEVOTHYROXINE SODIUM 0.15 MG/1
150 TABLET ORAL
Qty: 90 TABLET | Refills: 2 | Status: SHIPPED | OUTPATIENT
Start: 2023-04-25

## 2023-06-19 ENCOUNTER — TELEPHONE (OUTPATIENT)
Age: 78
End: 2023-06-19

## 2023-06-19 RX ORDER — HYDRALAZINE HYDROCHLORIDE 50 MG/1
50 TABLET, FILM COATED ORAL 3 TIMES DAILY
Qty: 270 TABLET | Refills: 3 | Status: SHIPPED | OUTPATIENT
Start: 2023-06-19

## 2023-06-19 NOTE — TELEPHONE ENCOUNTER
Requested Prescriptions     Pending Prescriptions Disp Refills    hydrALAZINE (APRESOLINE) 50 MG tablet 270 tablet 3     Sig: Take 1 tablet by mouth 3 times daily

## 2023-06-29 RX ORDER — CARVEDILOL 25 MG/1
25 TABLET ORAL 2 TIMES DAILY WITH MEALS
Qty: 180 TABLET | Refills: 0 | Status: SHIPPED | OUTPATIENT
Start: 2023-06-29

## 2023-08-02 ENCOUNTER — OFFICE VISIT (OUTPATIENT)
Age: 78
End: 2023-08-02
Payer: MEDICARE

## 2023-08-02 VITALS
DIASTOLIC BLOOD PRESSURE: 64 MMHG | OXYGEN SATURATION: 100 % | BODY MASS INDEX: 23.93 KG/M2 | RESPIRATION RATE: 14 BRPM | HEIGHT: 66 IN | TEMPERATURE: 97.4 F | HEART RATE: 57 BPM | SYSTOLIC BLOOD PRESSURE: 110 MMHG | WEIGHT: 148.9 LBS

## 2023-08-02 DIAGNOSIS — N18.31 STAGE 3A CHRONIC KIDNEY DISEASE (HCC): ICD-10-CM

## 2023-08-02 DIAGNOSIS — I10 ESSENTIAL HYPERTENSION: Primary | ICD-10-CM

## 2023-08-02 DIAGNOSIS — E03.9 ACQUIRED HYPOTHYROIDISM: ICD-10-CM

## 2023-08-02 DIAGNOSIS — E78.00 PURE HYPERCHOLESTEROLEMIA: ICD-10-CM

## 2023-08-02 DIAGNOSIS — Z72.0 TOBACCO USE: ICD-10-CM

## 2023-08-02 PROCEDURE — 1123F ACP DISCUSS/DSCN MKR DOCD: CPT | Performed by: FAMILY MEDICINE

## 2023-08-02 PROCEDURE — G8420 CALC BMI NORM PARAMETERS: HCPCS | Performed by: FAMILY MEDICINE

## 2023-08-02 PROCEDURE — 4004F PT TOBACCO SCREEN RCVD TLK: CPT | Performed by: FAMILY MEDICINE

## 2023-08-02 PROCEDURE — 3074F SYST BP LT 130 MM HG: CPT | Performed by: FAMILY MEDICINE

## 2023-08-02 PROCEDURE — G8427 DOCREV CUR MEDS BY ELIG CLIN: HCPCS | Performed by: FAMILY MEDICINE

## 2023-08-02 PROCEDURE — 99214 OFFICE O/P EST MOD 30 MIN: CPT | Performed by: FAMILY MEDICINE

## 2023-08-02 PROCEDURE — 3078F DIAST BP <80 MM HG: CPT | Performed by: FAMILY MEDICINE

## 2023-08-02 ASSESSMENT — PATIENT HEALTH QUESTIONNAIRE - PHQ9
2. FEELING DOWN, DEPRESSED OR HOPELESS: 0
SUM OF ALL RESPONSES TO PHQ QUESTIONS 1-9: 0
1. LITTLE INTEREST OR PLEASURE IN DOING THINGS: 0
SUM OF ALL RESPONSES TO PHQ QUESTIONS 1-9: 0
SUM OF ALL RESPONSES TO PHQ QUESTIONS 1-9: 0
SUM OF ALL RESPONSES TO PHQ9 QUESTIONS 1 & 2: 0
SUM OF ALL RESPONSES TO PHQ QUESTIONS 1-9: 0

## 2023-08-02 NOTE — PROGRESS NOTES
Morena Rodriguez, 68 y.o.,  male    SUBJECTIVE  Ff-up      HTN- Continues to take diovan 80, felodipine 10,  hydralazine dose to 50 mg tid and coreg 25 mg bid. Pt with h/o CKD. He has recurrent rash now seeing dr. Mansi Weeks whom he reports does not think reaction to any of previous antihypertensives norvasc or lisinopril. HL-on mevacor    He is a chronic smoker, about 3 cig/ day. aaa screen neg. Old granulomatous disease on repeat CXR negative. Hypothyroidism-reports  Compliant with 150 mcgs synthroid/day.      Remote h/o throat ca s/p radiation/chemotherapy 2003      ROS:  See HPI, all others negative        Patient Active Problem List   Diagnosis Code    Essential hypertension I10    Acquired hypothyroidism E03.9    Tobacco use Z72.0    Stage 3a chronic kidney disease (HCC) N18.31    Abnormal stress test R94.39    Cardiomyopathy (HCC) I42.9       Current Outpatient Medications:     carvedilol (COREG) 25 MG tablet, Take 1 tablet by mouth 2 times daily (with meals), Disp: 180 tablet, Rfl: 0    hydrALAZINE (APRESOLINE) 50 MG tablet, Take 1 tablet by mouth 3 times daily, Disp: 270 tablet, Rfl: 3    levothyroxine (SYNTHROID) 150 MCG tablet, Take 1 tablet by mouth every morning (before breakfast), Disp: 90 tablet, Rfl: 2    valsartan (DIOVAN) 80 MG tablet, TAKE ONE TABLET BY MOUTH DAILY, Disp: 90 tablet, Rfl: 1    felodipine (PLENDIL) 10 MG extended release tablet, Take 1 tablet by mouth daily, Disp: 90 tablet, Rfl: 3    lovastatin (MEVACOR) 20 MG tablet, Take 2 tablets by mouth, Disp: , Rfl:     potassium chloride (KLOR-CON M) 20 MEQ extended release tablet, Take 1 tablet by mouth 2 times daily, Disp: , Rfl:     sildenafil (VIAGRA) 50 MG tablet, Take 1 tablet by mouth as needed, Disp: , Rfl:         Allergies   Allergen Reactions    Lisinopril Rash    Norvasc [Amlodipine] Itching       Past Medical History:   Diagnosis Date    Cancer ([address]) 2012    throat    Gout     Hx of colonoscopy with polypectomy

## 2023-08-02 NOTE — PROGRESS NOTES
Any recent (exertional) chest pain? No  SOB? No  Palpitations? No  LE edema? No  Lightheadedness/dizziness? No    1. \"Have you been to the ER, urgent care clinic since your last visit? Hospitalized since your last visit? \" No    2. \"Have you seen or consulted any other health care providers outside of the [address] [place] [address] since your last visit? \" Yes Where: [facility]       3. For patients aged 43-73: Has the patient had a colonoscopy / FIT/ Cologuard? NA - based on age      If the patient is female:    4. For patients aged 43-66: Has the patient had a mammogram within the past 2 years? NA - based on age or sex      11. For patients aged 21-65: Has the patient had a pap smear?  NA - based on age or sex

## 2023-08-21 ENCOUNTER — OFFICE VISIT (OUTPATIENT)
Age: 78
End: 2023-08-21
Payer: MEDICARE

## 2023-08-21 VITALS
DIASTOLIC BLOOD PRESSURE: 53 MMHG | HEART RATE: 47 BPM | OXYGEN SATURATION: 100 % | SYSTOLIC BLOOD PRESSURE: 104 MMHG | BODY MASS INDEX: 24.03 KG/M2 | HEIGHT: 66 IN

## 2023-08-21 DIAGNOSIS — N18.31 CHRONIC KIDNEY DISEASE, STAGE 3A (HCC): ICD-10-CM

## 2023-08-21 DIAGNOSIS — E78.5 HYPERLIPIDEMIA, UNSPECIFIED HYPERLIPIDEMIA TYPE: ICD-10-CM

## 2023-08-21 DIAGNOSIS — I10 ESSENTIAL (PRIMARY) HYPERTENSION: ICD-10-CM

## 2023-08-21 DIAGNOSIS — I42.2 OTHER HYPERTROPHIC CARDIOMYOPATHY (HCC): Primary | ICD-10-CM

## 2023-08-21 PROCEDURE — 4004F PT TOBACCO SCREEN RCVD TLK: CPT | Performed by: INTERNAL MEDICINE

## 2023-08-21 PROCEDURE — 3078F DIAST BP <80 MM HG: CPT | Performed by: INTERNAL MEDICINE

## 2023-08-21 PROCEDURE — G8427 DOCREV CUR MEDS BY ELIG CLIN: HCPCS | Performed by: INTERNAL MEDICINE

## 2023-08-21 PROCEDURE — 1123F ACP DISCUSS/DSCN MKR DOCD: CPT | Performed by: INTERNAL MEDICINE

## 2023-08-21 PROCEDURE — 99214 OFFICE O/P EST MOD 30 MIN: CPT | Performed by: INTERNAL MEDICINE

## 2023-08-21 PROCEDURE — G8420 CALC BMI NORM PARAMETERS: HCPCS | Performed by: INTERNAL MEDICINE

## 2023-08-21 PROCEDURE — 3074F SYST BP LT 130 MM HG: CPT | Performed by: INTERNAL MEDICINE

## 2023-08-21 ASSESSMENT — PATIENT HEALTH QUESTIONNAIRE - PHQ9
SUM OF ALL RESPONSES TO PHQ QUESTIONS 1-9: 0
SUM OF ALL RESPONSES TO PHQ QUESTIONS 1-9: 0
SUM OF ALL RESPONSES TO PHQ9 QUESTIONS 1 & 2: 0
DEPRESSION UNABLE TO ASSESS: FUNCTIONAL CAPACITY MOTIVATION LIMITS ACCURACY
2. FEELING DOWN, DEPRESSED OR HOPELESS: 0
SUM OF ALL RESPONSES TO PHQ QUESTIONS 1-9: 0
1. LITTLE INTEREST OR PLEASURE IN DOING THINGS: 0
SUM OF ALL RESPONSES TO PHQ QUESTIONS 1-9: 0

## 2023-08-21 NOTE — PROGRESS NOTES
HISTORY OF PRESENT ILLNESS  Minda Grant is a 68 y.o. male. 2/12/2021  Patient seen today for new patient evaluation. He is referred here for evaluation of chest pain. Patient was in emergency room May 2020 with episodes of chest pain. Patient sharp pain in the chest at that time. Since that time he has not had any recurrence of chest pain. He denies any shortness of breath, dizziness palpitation or other symptom. Patient subsequent stress test in 12/2020 that was reported abnormal due to low ejection fraction. It was thought to be under calculation possibly from LVH. Perfusion was reported normal.  3/16/2021  Patient presents to f/u for HTN and echo results. He reports Coreg recently increased to 25 mg /BID by PCP, and b/p remains elevated. Review of Systems   Constitutional:  Negative for chills and fever. HENT:  Negative for nosebleeds. Eyes:  Negative for blurred vision and double vision. Respiratory:  Negative for cough, hemoptysis, sputum production, shortness of breath and wheezing. Cardiovascular:  Negative for chest pain, palpitations, orthopnea, claudication, leg swelling and PND. Gastrointestinal:  Negative for abdominal pain, heartburn, nausea and vomiting. Musculoskeletal:  Negative for myalgias. Skin:  Negative for rash. Neurological:  Negative for dizziness, weakness and headaches. Endo/Heme/Allergies:  Does not bruise/bleed easily.        Family History   Problem Relation Age of Onset    Cancer Father        Past Medical History:   Diagnosis Date    Cancer ([address]) 2012    throat    Gout     Hx of colonoscopy with polypectomy 11/15/2016    rpt 5 years 2021, dr. Lc Barone    Hypertension     Pancreatitis 1989    Thyroid disease        Past Surgical History:   Procedure Laterality Date    COLONOSCOPY  11/15/2016    repeat 5 years 2021, Dr Nabila Miller History     Tobacco Use    Smoking status:

## 2023-08-21 NOTE — PROGRESS NOTES
1. Have you been to the ER, urgent care clinic since your last visit? Hospitalized since your last visit? No    2. Have you seen or consulted any other health care providers outside of the [address] [place] [address] since your last visit? Include any pap smears or colon screening.       No

## 2023-09-13 RX ORDER — VALSARTAN 80 MG/1
TABLET ORAL
Qty: 90 TABLET | Refills: 1 | Status: SHIPPED | OUTPATIENT
Start: 2023-09-13

## 2023-10-09 NOTE — TELEPHONE ENCOUNTER
This patient contacted office for the following prescriptions to be filled:    Medication requested : Viagra 50 mg  PCP: [id number] Street or Print: [place]  Mail order or Local pharmacy [address] [place] [address] [phone]    Scheduled appointment if not seen by current providers in office: lov 8/2/2023 rocael 12/5/2023

## 2023-10-11 RX ORDER — SILDENAFIL 50 MG/1
50 TABLET, FILM COATED ORAL PRN
Qty: 30 TABLET | Refills: 1 | Status: SHIPPED | OUTPATIENT
Start: 2023-10-11

## 2023-11-09 RX ORDER — CARVEDILOL 25 MG/1
25 TABLET ORAL 2 TIMES DAILY WITH MEALS
Qty: 180 TABLET | Refills: 1 | Status: SHIPPED | OUTPATIENT
Start: 2023-11-09

## 2023-11-21 RX ORDER — LEVOTHYROXINE SODIUM 0.15 MG/1
150 TABLET ORAL
Qty: 90 TABLET | Refills: 0 | Status: SHIPPED | OUTPATIENT
Start: 2023-11-21

## 2023-11-24 LAB
A/G RATIO: 1.4 RATIO (ref 1.1–2.6)
ALBUMIN SERPL-MCNC: 3.9 G/DL (ref 3.5–5)
ALP BLD-CCNC: 88 U/L (ref 40–125)
ALT SERPL-CCNC: 8 U/L (ref 5–40)
ANION GAP SERPL CALCULATED.3IONS-SCNC: 9 MMOL/L (ref 3–15)
AST SERPL-CCNC: 13 U/L (ref 10–37)
BASOPHILS # BLD: 1 % (ref 0–2)
BASOPHILS ABSOLUTE: 0 K/UL (ref 0–0.2)
BILIRUB SERPL-MCNC: 0.3 MG/DL (ref 0.2–1.2)
BUN BLDV-MCNC: 17 MG/DL (ref 6–22)
CALCIUM SERPL-MCNC: 9.1 MG/DL (ref 8.4–10.5)
CHLORIDE BLD-SCNC: 108 MMOL/L (ref 98–110)
CO2: 24 MMOL/L (ref 20–32)
CREAT SERPL-MCNC: 1.5 MG/DL (ref 0.8–1.6)
EOSINOPHIL # BLD: 6 % (ref 0–6)
EOSINOPHILS ABSOLUTE: 0.2 K/UL (ref 0–0.5)
GLOBULIN: 2.8 G/DL (ref 2–4)
GLOMERULAR FILTRATION RATE: 46.2 ML/MIN/1.73 SQ.M.
GLUCOSE: 87 MG/DL (ref 70–99)
HCT VFR BLD CALC: 45 % (ref 37.8–52.2)
HEMOGLOBIN: 14.2 G/DL (ref 12.6–17.1)
LYMPHOCYTES # BLD: 36 % (ref 20–45)
LYMPHOCYTES ABSOLUTE: 1.3 K/UL (ref 1–4.8)
MCH RBC QN AUTO: 29 PG (ref 26–34)
MCHC RBC AUTO-ENTMCNC: 32 G/DL (ref 31–36)
MCV RBC AUTO: 92 FL (ref 80–95)
MONOCYTES ABSOLUTE: 0.5 K/UL (ref 0.1–1)
MONOCYTES: 12 % (ref 3–12)
NEUTROPHILS ABSOLUTE: 1.6 K/UL (ref 1.8–7.7)
NEUTROPHILS: 45 % (ref 40–75)
PDW BLD-RTO: 14.2 % (ref 10–15.5)
PLATELET # BLD: 203 K/UL (ref 140–440)
PMV BLD AUTO: 9.4 FL (ref 9–13)
POTASSIUM SERPL-SCNC: 4.2 MMOL/L (ref 3.5–5.5)
RBC: 4.9 M/UL (ref 3.8–5.8)
SODIUM BLD-SCNC: 141 MMOL/L (ref 133–145)
TOTAL PROTEIN: 6.7 G/DL (ref 6.2–8.1)
TSH SERPL DL<=0.05 MIU/L-ACNC: 3.18 MCU/ML (ref 0.27–4.2)
WBC: 3.7 K/UL (ref 4–11)

## 2023-12-05 ENCOUNTER — OFFICE VISIT (OUTPATIENT)
Age: 78
End: 2023-12-05
Payer: MEDICARE

## 2023-12-05 VITALS
HEIGHT: 66 IN | TEMPERATURE: 98.6 F | DIASTOLIC BLOOD PRESSURE: 62 MMHG | WEIGHT: 149 LBS | HEART RATE: 60 BPM | BODY MASS INDEX: 23.95 KG/M2 | RESPIRATION RATE: 16 BRPM | SYSTOLIC BLOOD PRESSURE: 118 MMHG | OXYGEN SATURATION: 98 %

## 2023-12-05 DIAGNOSIS — Z72.0 TOBACCO USE: ICD-10-CM

## 2023-12-05 DIAGNOSIS — R91.8 ABNORMAL CT SCAN OF LUNG: ICD-10-CM

## 2023-12-05 DIAGNOSIS — R91.1 PULMONARY NODULE: ICD-10-CM

## 2023-12-05 DIAGNOSIS — Z00.00 MEDICARE ANNUAL WELLNESS VISIT, SUBSEQUENT: Primary | ICD-10-CM

## 2023-12-05 DIAGNOSIS — I10 ESSENTIAL HYPERTENSION: ICD-10-CM

## 2023-12-05 DIAGNOSIS — E03.9 ACQUIRED HYPOTHYROIDISM: ICD-10-CM

## 2023-12-05 DIAGNOSIS — E78.00 PURE HYPERCHOLESTEROLEMIA: ICD-10-CM

## 2023-12-05 DIAGNOSIS — N18.31 STAGE 3A CHRONIC KIDNEY DISEASE (HCC): ICD-10-CM

## 2023-12-05 DIAGNOSIS — J43.9 PULMONARY EMPHYSEMA, UNSPECIFIED EMPHYSEMA TYPE (HCC): ICD-10-CM

## 2023-12-05 PROCEDURE — 4004F PT TOBACCO SCREEN RCVD TLK: CPT | Performed by: FAMILY MEDICINE

## 2023-12-05 PROCEDURE — G8427 DOCREV CUR MEDS BY ELIG CLIN: HCPCS | Performed by: FAMILY MEDICINE

## 2023-12-05 PROCEDURE — 3078F DIAST BP <80 MM HG: CPT | Performed by: FAMILY MEDICINE

## 2023-12-05 PROCEDURE — 99214 OFFICE O/P EST MOD 30 MIN: CPT | Performed by: FAMILY MEDICINE

## 2023-12-05 PROCEDURE — 3023F SPIROM DOC REV: CPT | Performed by: FAMILY MEDICINE

## 2023-12-05 PROCEDURE — 3074F SYST BP LT 130 MM HG: CPT | Performed by: FAMILY MEDICINE

## 2023-12-05 PROCEDURE — G0439 PPPS, SUBSEQ VISIT: HCPCS | Performed by: FAMILY MEDICINE

## 2023-12-05 PROCEDURE — 1123F ACP DISCUSS/DSCN MKR DOCD: CPT | Performed by: FAMILY MEDICINE

## 2023-12-05 PROCEDURE — G8484 FLU IMMUNIZE NO ADMIN: HCPCS | Performed by: FAMILY MEDICINE

## 2023-12-05 PROCEDURE — G8420 CALC BMI NORM PARAMETERS: HCPCS | Performed by: FAMILY MEDICINE

## 2023-12-05 ASSESSMENT — PATIENT HEALTH QUESTIONNAIRE - PHQ9
SUM OF ALL RESPONSES TO PHQ QUESTIONS 1-9: 0
SUM OF ALL RESPONSES TO PHQ QUESTIONS 1-9: 0
2. FEELING DOWN, DEPRESSED OR HOPELESS: 0
SUM OF ALL RESPONSES TO PHQ QUESTIONS 1-9: 0
SUM OF ALL RESPONSES TO PHQ9 QUESTIONS 1 & 2: 0
1. LITTLE INTEREST OR PLEASURE IN DOING THINGS: 0
SUM OF ALL RESPONSES TO PHQ QUESTIONS 1-9: 0

## 2023-12-05 ASSESSMENT — LIFESTYLE VARIABLES
HOW OFTEN DO YOU HAVE A DRINK CONTAINING ALCOHOL: NEVER
HOW MANY STANDARD DRINKS CONTAINING ALCOHOL DO YOU HAVE ON A TYPICAL DAY: PATIENT DOES NOT DRINK

## 2023-12-05 NOTE — PROGRESS NOTES
1. \"Have you been to the ER, urgent care clinic since your last visit? Hospitalized since your last visit? \" No    2. \"Have you seen or consulted any other health care providers outside of the [address] [place] [address] since your last visit? \" No     3. For patients aged 43-73: Has the patient had a colonoscopy / FIT/ Cologuard? No      If the patient is female:    4. For patients aged 43-66: Has the patient had a mammogram within the past 2 years? NA - based on age or sex      11. For patients aged 21-65: Has the patient had a pap smear?  NA - based on age or sex

## 2024-02-05 ENCOUNTER — OFFICE VISIT (OUTPATIENT)
Age: 79
End: 2024-02-05
Payer: MEDICARE

## 2024-02-05 VITALS
BODY MASS INDEX: 22.66 KG/M2 | WEIGHT: 141 LBS | HEIGHT: 66 IN | DIASTOLIC BLOOD PRESSURE: 58 MMHG | HEART RATE: 51 BPM | SYSTOLIC BLOOD PRESSURE: 115 MMHG

## 2024-02-05 DIAGNOSIS — E78.5 HYPERLIPIDEMIA, UNSPECIFIED HYPERLIPIDEMIA TYPE: ICD-10-CM

## 2024-02-05 DIAGNOSIS — N18.31 CHRONIC KIDNEY DISEASE, STAGE 3A (HCC): ICD-10-CM

## 2024-02-05 DIAGNOSIS — I42.2 OTHER HYPERTROPHIC CARDIOMYOPATHY (HCC): Primary | ICD-10-CM

## 2024-02-05 DIAGNOSIS — I10 ESSENTIAL (PRIMARY) HYPERTENSION: ICD-10-CM

## 2024-02-05 PROCEDURE — 99214 OFFICE O/P EST MOD 30 MIN: CPT | Performed by: INTERNAL MEDICINE

## 2024-02-05 PROCEDURE — G8420 CALC BMI NORM PARAMETERS: HCPCS | Performed by: INTERNAL MEDICINE

## 2024-02-05 PROCEDURE — G8428 CUR MEDS NOT DOCUMENT: HCPCS | Performed by: INTERNAL MEDICINE

## 2024-02-05 PROCEDURE — 3078F DIAST BP <80 MM HG: CPT | Performed by: INTERNAL MEDICINE

## 2024-02-05 PROCEDURE — 4004F PT TOBACCO SCREEN RCVD TLK: CPT | Performed by: INTERNAL MEDICINE

## 2024-02-05 PROCEDURE — 3074F SYST BP LT 130 MM HG: CPT | Performed by: INTERNAL MEDICINE

## 2024-02-05 PROCEDURE — G8484 FLU IMMUNIZE NO ADMIN: HCPCS | Performed by: INTERNAL MEDICINE

## 2024-02-05 PROCEDURE — 1123F ACP DISCUSS/DSCN MKR DOCD: CPT | Performed by: INTERNAL MEDICINE

## 2024-02-05 ASSESSMENT — PATIENT HEALTH QUESTIONNAIRE - PHQ9
SUM OF ALL RESPONSES TO PHQ9 QUESTIONS 1 & 2: 0
SUM OF ALL RESPONSES TO PHQ QUESTIONS 1-9: 0
1. LITTLE INTEREST OR PLEASURE IN DOING THINGS: 0
SUM OF ALL RESPONSES TO PHQ QUESTIONS 1-9: 0
DEPRESSION UNABLE TO ASSESS: FUNCTIONAL CAPACITY MOTIVATION LIMITS ACCURACY
2. FEELING DOWN, DEPRESSED OR HOPELESS: 0
SUM OF ALL RESPONSES TO PHQ QUESTIONS 1-9: 0
SUM OF ALL RESPONSES TO PHQ QUESTIONS 1-9: 0

## 2024-02-05 NOTE — PROGRESS NOTES
1. Have you been to the ER, urgent care clinic since your last visit?  Hospitalized since your last visit?     No    2. Have you seen or consulted any other health care providers outside of the [facility] since your last visit?  Include any pap smears or colon screening.      No

## 2024-02-05 NOTE — PROGRESS NOTES
HISTORY OF PRESENT ILLNESS  Zac Kilgore is a 76 y.o. male.    2/12/2021  Patient seen today for new patient evaluation.  He is referred here for evaluation of chest pain.  Patient was in emergency room May 2020 with episodes of chest pain.  Patient sharp pain in the chest at that time.  Since that time he has not had any recurrence of chest pain.  He denies any shortness of breath, dizziness palpitation or other symptom.  Patient subsequent stress test in 12/2020 that was reported abnormal due to low ejection fraction.  It was thought to be under calculation possibly from LVH.  Perfusion was reported normal.  3/16/2021  Patient presents to f/u for HTN and echo results.  He reports Coreg recently increased to 25 mg /BID by PCP, and b/p remains elevated.      Review of Systems   Constitutional:  Negative for chills and fever.   HENT:  Negative for nosebleeds.    Eyes:  Negative for blurred vision and double vision.   Respiratory:  Negative for cough, hemoptysis, sputum production, shortness of breath and wheezing.    Cardiovascular:  Negative for chest pain, palpitations, orthopnea, claudication, leg swelling and PND.   Gastrointestinal:  Negative for abdominal pain, heartburn, nausea and vomiting.   Musculoskeletal:  Negative for myalgias.   Skin:  Negative for rash.   Neurological:  Negative for dizziness, weakness and headaches.   Endo/Heme/Allergies:  Does not bruise/bleed easily.       Family History   Problem Relation Age of Onset    Cancer Father        Past Medical History:   Diagnosis Date    Cancer (HCC) 2012    throat    Gout     Hx of colonoscopy with polypectomy 11/15/2016    rpt 5 years 2021, dr. mireles    Hypertension     Pancreatitis 1989    Thyroid disease        Past Surgical History:   Procedure Laterality Date    COLONOSCOPY  11/15/2016    repeat 5 years 2021, Dr Oni Mireles polyp    ID UNLISTED PROCEDURE ABDOMEN PERITONEUM & OMENTUM         Social History     Tobacco Use    Smoking status:

## 2024-02-29 RX ORDER — LEVOTHYROXINE SODIUM 0.15 MG/1
150 TABLET ORAL
Qty: 90 TABLET | Refills: 3 | Status: SHIPPED | OUTPATIENT
Start: 2024-02-29

## 2024-03-06 DIAGNOSIS — I10 ESSENTIAL (PRIMARY) HYPERTENSION: Primary | ICD-10-CM

## 2024-03-06 LAB
A/G RATIO: 1.3 RATIO (ref 1.1–2.6)
ALBUMIN SERPL-MCNC: 3.7 G/DL (ref 3.5–5)
ALP BLD-CCNC: 84 U/L (ref 40–125)
ALT SERPL-CCNC: <5 U/L (ref 5–40)
ANION GAP SERPL CALCULATED.3IONS-SCNC: 10 MMOL/L (ref 3–15)
AST SERPL-CCNC: 12 U/L (ref 10–37)
BASOPHILS # BLD: 1 % (ref 0–2)
BASOPHILS ABSOLUTE: 0 K/UL (ref 0–0.2)
BILIRUB SERPL-MCNC: 0.6 MG/DL (ref 0.2–1.2)
BUN BLDV-MCNC: 19 MG/DL (ref 6–22)
CALCIUM SERPL-MCNC: 8.3 MG/DL (ref 8.4–10.5)
CHLORIDE BLD-SCNC: 108 MMOL/L (ref 98–110)
CHOLESTEROL/HDL RATIO: 2.1 (ref 0–5)
CHOLESTEROL: 156 MG/DL (ref 110–200)
CO2: 23 MMOL/L (ref 20–32)
CREAT SERPL-MCNC: 1.7 MG/DL (ref 0.8–1.6)
EOSINOPHIL # BLD: 3 % (ref 0–6)
EOSINOPHILS ABSOLUTE: 0.1 K/UL (ref 0–0.5)
GLOBULIN: 2.8 G/DL (ref 2–4)
GLOMERULAR FILTRATION RATE: 40 ML/MIN/1.73 SQ.M.
GLUCOSE: 88 MG/DL (ref 70–99)
HCT VFR BLD CALC: 43.2 % (ref 37.8–52.2)
HDLC SERPL-MCNC: 73 MG/DL
HEMOGLOBIN: 13.9 G/DL (ref 12.6–17.1)
LDL CHOLESTEROL CALCULATED: 68 MG/DL (ref 50–99)
LDL/HDL RATIO: 0.9
LYMPHOCYTES # BLD: 28 % (ref 20–45)
LYMPHOCYTES ABSOLUTE: 1.1 K/UL (ref 1–4.8)
MCH RBC QN AUTO: 29 PG (ref 26–34)
MCHC RBC AUTO-ENTMCNC: 32 G/DL (ref 31–36)
MCV RBC AUTO: 91 FL (ref 80–95)
MONOCYTES ABSOLUTE: 0.4 K/UL (ref 0.1–1)
MONOCYTES: 11 % (ref 3–12)
NEUTROPHILS ABSOLUTE: 2.2 K/UL (ref 1.8–7.7)
NEUTROPHILS: 57 % (ref 40–75)
NON-HDL CHOLESTEROL: 83 MG/DL
PDW BLD-RTO: 14.6 % (ref 10–15.5)
PLATELET # BLD: 193 K/UL (ref 140–440)
PMV BLD AUTO: 10.1 FL (ref 9–13)
POTASSIUM SERPL-SCNC: 4 MMOL/L (ref 3.5–5.5)
RBC: 4.75 M/UL (ref 3.8–5.8)
SODIUM BLD-SCNC: 141 MMOL/L (ref 133–145)
TOTAL PROTEIN: 6.5 G/DL (ref 6.2–8.1)
TRIGL SERPL-MCNC: 74 MG/DL (ref 40–149)
VLDLC SERPL CALC-MCNC: 15 MG/DL (ref 8–30)
WBC: 3.9 K/UL (ref 4–11)

## 2024-03-06 RX ORDER — FELODIPINE 10 MG/1
10 TABLET, EXTENDED RELEASE ORAL DAILY
Qty: 90 TABLET | Refills: 3 | Status: SHIPPED | OUTPATIENT
Start: 2024-03-06

## 2024-03-06 NOTE — TELEPHONE ENCOUNTER
Requested Prescriptions     Pending Prescriptions Disp Refills    felodipine (PLENDIL) 10 MG extended release tablet 90 tablet 3     Sig: Take 1 tablet by mouth daily

## 2024-03-11 RX ORDER — VALSARTAN 80 MG/1
80 TABLET ORAL DAILY
Qty: 90 TABLET | Refills: 1 | Status: SHIPPED | OUTPATIENT
Start: 2024-03-11

## 2024-03-11 NOTE — TELEPHONE ENCOUNTER
LOV 12/05/2023  F/U 03/12/2024   Has one pill left. Is aware that he has an appointment tomorrow.

## 2024-03-12 ENCOUNTER — OFFICE VISIT (OUTPATIENT)
Age: 79
End: 2024-03-12
Payer: MEDICARE

## 2024-03-12 VITALS
DIASTOLIC BLOOD PRESSURE: 68 MMHG | TEMPERATURE: 98.2 F | OXYGEN SATURATION: 98 % | WEIGHT: 144 LBS | SYSTOLIC BLOOD PRESSURE: 116 MMHG | HEIGHT: 66 IN | RESPIRATION RATE: 18 BRPM | HEART RATE: 50 BPM | BODY MASS INDEX: 23.14 KG/M2

## 2024-03-12 DIAGNOSIS — J43.9 PULMONARY EMPHYSEMA, UNSPECIFIED EMPHYSEMA TYPE (HCC): ICD-10-CM

## 2024-03-12 DIAGNOSIS — I10 ESSENTIAL HYPERTENSION: Primary | ICD-10-CM

## 2024-03-12 DIAGNOSIS — E78.00 PURE HYPERCHOLESTEROLEMIA: ICD-10-CM

## 2024-03-12 DIAGNOSIS — Z72.0 TOBACCO USE: ICD-10-CM

## 2024-03-12 DIAGNOSIS — E03.9 ACQUIRED HYPOTHYROIDISM: ICD-10-CM

## 2024-03-12 DIAGNOSIS — N18.31 STAGE 3A CHRONIC KIDNEY DISEASE (HCC): ICD-10-CM

## 2024-03-12 PROCEDURE — 3023F SPIROM DOC REV: CPT | Performed by: FAMILY MEDICINE

## 2024-03-12 PROCEDURE — 4004F PT TOBACCO SCREEN RCVD TLK: CPT | Performed by: FAMILY MEDICINE

## 2024-03-12 PROCEDURE — 3074F SYST BP LT 130 MM HG: CPT | Performed by: FAMILY MEDICINE

## 2024-03-12 PROCEDURE — G8420 CALC BMI NORM PARAMETERS: HCPCS | Performed by: FAMILY MEDICINE

## 2024-03-12 PROCEDURE — G8427 DOCREV CUR MEDS BY ELIG CLIN: HCPCS | Performed by: FAMILY MEDICINE

## 2024-03-12 PROCEDURE — 3078F DIAST BP <80 MM HG: CPT | Performed by: FAMILY MEDICINE

## 2024-03-12 PROCEDURE — 99214 OFFICE O/P EST MOD 30 MIN: CPT | Performed by: FAMILY MEDICINE

## 2024-03-12 PROCEDURE — G8484 FLU IMMUNIZE NO ADMIN: HCPCS | Performed by: FAMILY MEDICINE

## 2024-03-12 PROCEDURE — 1123F ACP DISCUSS/DSCN MKR DOCD: CPT | Performed by: FAMILY MEDICINE

## 2024-03-12 ASSESSMENT — PATIENT HEALTH QUESTIONNAIRE - PHQ9
SUM OF ALL RESPONSES TO PHQ QUESTIONS 1-9: 0
SUM OF ALL RESPONSES TO PHQ QUESTIONS 1-9: 0
1. LITTLE INTEREST OR PLEASURE IN DOING THINGS: 0
SUM OF ALL RESPONSES TO PHQ9 QUESTIONS 1 & 2: 0
SUM OF ALL RESPONSES TO PHQ QUESTIONS 1-9: 0
SUM OF ALL RESPONSES TO PHQ QUESTIONS 1-9: 0
2. FEELING DOWN, DEPRESSED OR HOPELESS: 0

## 2024-03-12 NOTE — PROGRESS NOTES
\"Have you been to the ER, urgent care clinic since your last visit?  Hospitalized since your last visit?\"    NO    “Have you seen or consulted any other health care providers outside of [facility] since your last visit?”    NO    The patient reports he is holding off on the Pulmonology referral at this time.        
synthroid/day  Tsh 12/2023 wnl    Tobacco use  Discussed complete cessation    Stage 3a chronic kidney disease  Monitoring  Bmp/cbc    Atypical chest pain   improved  Stress test/echo 2/2021 normal  Following cardiology, has appt this month dr. SEMAJ higgins    Pure hypercholesterolemia  Good range, Cont mevacor    Pulmonary nodule  Smoker  Advised to pursue pulm consult    Pulmonary emphysema, unspecified emphysema type (HCC)  Incidental finding on CT, smoker    Abnormal CT scan of lung    Ff-up in 3 months or sooner prn

## 2024-06-04 LAB
ANION GAP SERPL CALCULATED.3IONS-SCNC: 10 MMOL/L (ref 3–15)
BUN BLDV-MCNC: 18 MG/DL (ref 6–22)
CALCIUM SERPL-MCNC: 8.5 MG/DL (ref 8.4–10.5)
CHLORIDE BLD-SCNC: 109 MMOL/L (ref 98–110)
CO2: 23 MMOL/L (ref 20–32)
CREAT SERPL-MCNC: 1.5 MG/DL (ref 0.8–1.6)
GFR, ESTIMATED: 46.2 ML/MIN/1.73 SQ.M.
GLUCOSE: 86 MG/DL (ref 70–99)
HCT VFR BLD CALC: 42.9 % (ref 37.8–52.2)
HEMOGLOBIN: 13.8 G/DL (ref 12.6–17.1)
MCH RBC QN AUTO: 29 PG (ref 26–34)
MCHC RBC AUTO-ENTMCNC: 32 G/DL (ref 31–36)
MCV RBC AUTO: 91 FL (ref 80–95)
PDW BLD-RTO: 14.2 % (ref 10–15.5)
PLATELET # BLD: 171 K/UL (ref 140–440)
PMV BLD AUTO: 11.5 FL (ref 9–13)
POTASSIUM SERPL-SCNC: 3.8 MMOL/L (ref 3.5–5.5)
RBC # BLD: 4.74 M/UL (ref 3.8–5.8)
SODIUM BLD-SCNC: 142 MMOL/L (ref 133–145)
WBC # BLD: 3.3 K/UL (ref 4–11)

## 2024-06-12 ENCOUNTER — OFFICE VISIT (OUTPATIENT)
Facility: CLINIC | Age: 79
End: 2024-06-12

## 2024-06-12 VITALS
RESPIRATION RATE: 16 BRPM | WEIGHT: 133 LBS | TEMPERATURE: 96.8 F | DIASTOLIC BLOOD PRESSURE: 64 MMHG | HEIGHT: 66 IN | HEART RATE: 50 BPM | OXYGEN SATURATION: 98 % | SYSTOLIC BLOOD PRESSURE: 124 MMHG | BODY MASS INDEX: 21.38 KG/M2

## 2024-06-12 DIAGNOSIS — I10 ESSENTIAL HYPERTENSION: Primary | ICD-10-CM

## 2024-06-12 DIAGNOSIS — J43.9 PULMONARY EMPHYSEMA, UNSPECIFIED EMPHYSEMA TYPE (HCC): ICD-10-CM

## 2024-06-12 DIAGNOSIS — E78.00 PURE HYPERCHOLESTEROLEMIA: ICD-10-CM

## 2024-06-12 DIAGNOSIS — Z72.0 TOBACCO USE: ICD-10-CM

## 2024-06-12 DIAGNOSIS — E03.9 ACQUIRED HYPOTHYROIDISM: ICD-10-CM

## 2024-06-12 SDOH — ECONOMIC STABILITY: FOOD INSECURITY: WITHIN THE PAST 12 MONTHS, YOU WORRIED THAT YOUR FOOD WOULD RUN OUT BEFORE YOU GOT MONEY TO BUY MORE.: OFTEN TRUE

## 2024-06-12 SDOH — ECONOMIC STABILITY: FOOD INSECURITY: WITHIN THE PAST 12 MONTHS, THE FOOD YOU BOUGHT JUST DIDN'T LAST AND YOU DIDN'T HAVE MONEY TO GET MORE.: OFTEN TRUE

## 2024-06-12 SDOH — ECONOMIC STABILITY: INCOME INSECURITY: HOW HARD IS IT FOR YOU TO PAY FOR THE VERY BASICS LIKE FOOD, HOUSING, MEDICAL CARE, AND HEATING?: VERY HARD

## 2024-06-12 NOTE — PROGRESS NOTES
Zac Kilgore, 78 y.o.,  male    SUBJECTIVE  Ff-up    HTN- Continues to take diovan 80, felodipine 10,  hydralazine dose to 50 mg tid and coreg 25 mg bid.    Pt with h/o CKD.     HL-on mevacor    He is a chronic smoker, about 3 cig/ day.  aaa screen neg.    Hypothyroidism-reports  Compliant with 150 mcgs synthroid/day.     Remote h/o throat ca s/p radiation/chemotherapy 2003- says consulted with ENT for left ear pain, reviewed CT chest/neck 8/2023 care everywhere [facility]. Ct chest showing 3 mm R middle lobe nodule, low suspicion for metastatic diease and moderate centrilobular emphysema.  And bronchus defect likely polyp. He denies any sob, wheezing. CT neg neg for Lymph nodes.  Has pulm appt 10/2024        ROS:  See HPI, all others negative        Patient Active Problem List   Diagnosis Code    Essential hypertension I10    Acquired hypothyroidism E03.9    Tobacco use Z72.0    Stage 3a chronic kidney disease (HCC) N18.31    Abnormal stress test R94.39    Cardiomyopathy (HCC) I42.9       Current Outpatient Medications:     valsartan (DIOVAN) 80 MG tablet, Take 1 tablet by mouth daily, Disp: 90 tablet, Rfl: 1    felodipine (PLENDIL) 10 MG extended release tablet, Take 1 tablet by mouth daily, Disp: 90 tablet, Rfl: 3    levothyroxine (SYNTHROID) 150 MCG tablet, Take 1 tablet by mouth every morning (before breakfast), Disp: 90 tablet, Rfl: 3    carvedilol (COREG) 25 MG tablet, TAKE 1 TABLET BY MOUTH TWICE A DAY WITH MEALS, Disp: 180 tablet, Rfl: 1    sildenafil (VIAGRA) 50 MG tablet, Take 1 tablet by mouth as needed for Erectile Dysfunction, Disp: 30 tablet, Rfl: 1    hydrALAZINE (APRESOLINE) 50 MG tablet, Take 1 tablet by mouth 3 times daily, Disp: 270 tablet, Rfl: 3    lovastatin (MEVACOR) 20 MG tablet, Take 2 tablets by mouth, Disp: , Rfl:     potassium chloride (KLOR-CON M) 20 MEQ extended release tablet, Take 1 tablet by mouth 2 times daily, Disp: , Rfl:         Allergies   Allergen Reactions    Lisinopril

## 2024-06-12 NOTE — PROGRESS NOTES
\"Have you been to the ER, urgent care clinic since your last visit?  Hospitalized since your last visit?\"    NO    “Have you seen or consulted any other [facility] care providers [facility]?”    NO      Appt: Dr. Villegas 08/01/2024 Cards            Dr. Berry 10/09/2024 pulm      Click Here for Release of Records Request

## 2024-08-14 ENCOUNTER — OFFICE VISIT (OUTPATIENT)
Age: 79
End: 2024-08-14
Payer: MEDICARE

## 2024-08-14 VITALS
SYSTOLIC BLOOD PRESSURE: 118 MMHG | BODY MASS INDEX: 20.83 KG/M2 | TEMPERATURE: 97.6 F | HEIGHT: 66 IN | HEART RATE: 55 BPM | WEIGHT: 129.6 LBS | DIASTOLIC BLOOD PRESSURE: 50 MMHG | OXYGEN SATURATION: 97 % | RESPIRATION RATE: 16 BRPM

## 2024-08-14 DIAGNOSIS — J32.9 RHINOSINUSITIS: ICD-10-CM

## 2024-08-14 DIAGNOSIS — R63.4 ABNORMAL WEIGHT LOSS: ICD-10-CM

## 2024-08-14 DIAGNOSIS — J43.2 CENTRILOBULAR EMPHYSEMA (HCC): Primary | ICD-10-CM

## 2024-08-14 DIAGNOSIS — Z72.0 TOBACCO ABUSE: ICD-10-CM

## 2024-08-14 PROCEDURE — G8420 CALC BMI NORM PARAMETERS: HCPCS | Performed by: INTERNAL MEDICINE

## 2024-08-14 PROCEDURE — 3023F SPIROM DOC REV: CPT | Performed by: INTERNAL MEDICINE

## 2024-08-14 PROCEDURE — 3078F DIAST BP <80 MM HG: CPT | Performed by: INTERNAL MEDICINE

## 2024-08-14 PROCEDURE — G8428 CUR MEDS NOT DOCUMENT: HCPCS | Performed by: INTERNAL MEDICINE

## 2024-08-14 PROCEDURE — 4004F PT TOBACCO SCREEN RCVD TLK: CPT | Performed by: INTERNAL MEDICINE

## 2024-08-14 PROCEDURE — 99406 BEHAV CHNG SMOKING 3-10 MIN: CPT | Performed by: INTERNAL MEDICINE

## 2024-08-14 PROCEDURE — 3074F SYST BP LT 130 MM HG: CPT | Performed by: INTERNAL MEDICINE

## 2024-08-14 PROCEDURE — 1123F ACP DISCUSS/DSCN MKR DOCD: CPT | Performed by: INTERNAL MEDICINE

## 2024-08-14 PROCEDURE — 99204 OFFICE O/P NEW MOD 45 MIN: CPT | Performed by: INTERNAL MEDICINE

## 2024-08-14 RX ORDER — NICOTINE 21 MG/24HR
1 PATCH, TRANSDERMAL 24 HOURS TRANSDERMAL DAILY
Qty: 42 PATCH | Refills: 0 | Status: SHIPPED | OUTPATIENT
Start: 2024-08-14 | End: 2024-09-25

## 2024-08-14 RX ORDER — NICOTINE 21 MG/24HR
1 PATCH, TRANSDERMAL 24 HOURS TRANSDERMAL DAILY
Qty: 14 PATCH | Refills: 0 | Status: SHIPPED | OUTPATIENT
Start: 2024-09-26 | End: 2024-10-10

## 2024-08-14 NOTE — PROGRESS NOTES
[facility]                                                       Pulmonary, Critical Care, and Sleep Medicine      Pulmonary Office Initial Consultation.    Name: Zac Kilgore     : 1945     Date: 2024        Subjective:   Chief complaint: Lung nodule  Patient is a 78 y.o. male with a PMHx of Cancer of the throat, Gout, HTN, pancreatitis, and thyroid disease.    HPI (2024):  Today in clinic, he states that he is here to establish care.  Reports that his ENT told him that his mouth soreness is likely due to his lungs.   Notes that he is always spiting. Notes continued ear ache as well.  Symptoms have been present for the past few months.  Reports weight loss of about 31 Lbs in the past year. Notes decreased appetite due to mouth pain.   Has cut down his alcohol use and has not drank in the past two weeks.  No night sweats, fever, chills, or chest pain.  Notes intermittent wheezing.  Notes prior intermittent dyspnea.  Hemoptysis: none  Denies dysphagia to solids or liquids.    Reports singificant pain in his upper palate, maxillary and frontal sinuses. Worse with eating and drinking water.    H/o Throat cancer - diagnosed 20 years ago; completed RT and Chemo; no surgical intervention.     Current inhalers and/or respiratory treatments:  -none    Prior inhalers and/ or respiratory treatments:  -none    Respiratory childhood illness: none    Tobacco/Substance/Vape/Hookah: smokes 2-3 cigs/day; previously smoked 1/2 ppd x50 years. No MJ, vape or hookah use. Etoh - previously drinking 1 pint per day.  Occupation: retired; previously worked in construction. Prior to this he was working at the [employer] - dust exposure x20 years. Has not worked since .  Travel: none  TB / TB exposure/testing or TB treatment: no known exposures.  VTE/DVT history: none   service: No  Autoimmune disease: none  COVID-19 (+)

## 2024-08-14 NOTE — PATIENT INSTRUCTIONS
What is the plan?  -Complete lung function test (PFT)  -Complete CAT/CT scan of your lungs and sinuses  -Start on Stiolto - take daily wether or not you feel short of breath  -Albuterol rescue inhaler - use ONLY as needed  -Stop use of All TOBACCO products  -Start on Nicotine patches  -Stop use of Alcohol

## 2024-08-14 NOTE — PROGRESS NOTES
Zac Kilgore presents today for   Chief Complaint   Patient presents with    Abnormal CT    Pulmonary Emphysema       Is someone accompanying this pt? Wife    Is the patient using any DME equipment during OV? No    -DME Company No    Depression Screening:        3/12/2024     9:42 AM   PHQ-9 Questionaire   Little interest or pleasure in doing things 0   Feeling down, depressed, or hopeless 0   PHQ-9 Total Score 0       Learning Assessment:    Failed to redirect to the Timeline version of the [employer] SmartLink.    Abuse Screening:         No data to display                Fall Risk    Failed to redirect to the Timeline version of the [employer] SmartLink.    Coordination of Care:    1. Have you been to the ER, urgent care clinic since your last visit? Hospitalized since your last visit? No    2. Have you seen or consulted any other health care providers outside of the [facility] since your last visit? Include any pap smears or colon screening. No    Medication list has been update per patient.

## 2024-08-14 NOTE — TELEPHONE ENCOUNTER
This patient contacted office for the following prescriptions to be filled:    Medication requested : Carvedilol 25 mg  PCP: Johnny  Pharmacy or Print: [place]  Mail order or Local pharmacy [address] [facility]    Scheduled appointment if not seen by current providers in office: lov 6/12/2024 rocael 12/12/2024

## 2024-08-15 RX ORDER — CARVEDILOL 25 MG/1
25 TABLET ORAL 2 TIMES DAILY WITH MEALS
Qty: 180 TABLET | Refills: 1 | Status: SHIPPED | OUTPATIENT
Start: 2024-08-15

## 2024-08-15 NOTE — TELEPHONE ENCOUNTER
Last OV 06/12/2024  Next OV 12/12/2024  Last lab 06/04/2024  Last filled 11/09/2023  180 tabs  1 Rf

## 2024-08-20 ENCOUNTER — TELEPHONE (OUTPATIENT)
Age: 79
End: 2024-08-20

## 2024-08-20 ENCOUNTER — HOSPITAL ENCOUNTER (OUTPATIENT)
Facility: HOSPITAL | Age: 79
Discharge: HOME | End: 2024-08-23
Attending: INTERNAL MEDICINE
Payer: MEDICARE

## 2024-08-20 DIAGNOSIS — Z72.0 TOBACCO ABUSE: ICD-10-CM

## 2024-08-20 DIAGNOSIS — R63.4 ABNORMAL WEIGHT LOSS: ICD-10-CM

## 2024-08-20 DIAGNOSIS — J43.2 CENTRILOBULAR EMPHYSEMA (HCC): ICD-10-CM

## 2024-08-20 DIAGNOSIS — J32.9 RHINOSINUSITIS: ICD-10-CM

## 2024-08-20 PROCEDURE — 70486 CT MAXILLOFACIAL W/O DYE: CPT

## 2024-08-20 PROCEDURE — 71250 CT THORAX DX C-: CPT

## 2024-08-20 NOTE — TELEPHONE ENCOUNTER
Sofia from [facility] CT Radiology [phone] stated if the sinus CT needs to have contrasst or w/o contrast please advise ASAP pt is currently at the appt.

## 2024-08-20 NOTE — TELEPHONE ENCOUNTER
Patient is there now for the CT they do not normally do the ct of the sinuses w/wo contrast she is going to change the order per the scanning protocol and send you a order to sign off on

## 2024-08-20 NOTE — TELEPHONE ENCOUNTER
Jerry from [facility] CT Radiology needs to know wants to know if the CT Sinus w/wo contrast was it done as a mistake or needed? Please advise [phone]

## 2024-08-20 NOTE — TELEPHONE ENCOUNTER
Orders were sent for ct/chest and ct/sinuses they are wanting to confirm that you wanted both scans done.  Please advise

## 2024-08-22 ENCOUNTER — TELEPHONE (OUTPATIENT)
Age: 79
End: 2024-08-22

## 2024-08-22 NOTE — TELEPHONE ENCOUNTER
Patient is having a lot of mouth pain wife would like to know if he can get something for the pain he can't put his dentures in and food and drink just hurts/burns the roof of his mouth.  Patient did have the CT scans done yesterday that you ordered.  Please advise

## 2024-08-27 ENCOUNTER — TELEPHONE (OUTPATIENT)
Age: 79
End: 2024-08-27

## 2024-08-27 NOTE — TELEPHONE ENCOUNTER
I returned patient's spouse's call concerning his continued oral/upper palate pain.  Patient states he is unable to eat much.  I reviewed results of CT of the sinuses as well as CT of the chest.  Of note, some mild mucosal changes noted on CT sinuses.  Concern for continued mucous within distal trachea seen on repeat CT chest.  Recommend repeat imaging in November.  In the event that this persists, I do recommend direct verbalization with bronchoscopy.  Patient understands and is agreeable.  Pertaining to his oral pain, recommend follow-up with either PCP or dentist.  Patient agreeable.  All questions answered to his satisfaction.  Will CC PCP's on this note.    Anton Berry DO   08/27/24  Pulmonary, Critical Care Medicine  [facility]

## 2024-09-04 ENCOUNTER — TELEPHONE (OUTPATIENT)
Facility: CLINIC | Age: 79
End: 2024-09-04

## 2024-09-04 ENCOUNTER — OFFICE VISIT (OUTPATIENT)
Facility: CLINIC | Age: 79
End: 2024-09-04
Payer: MEDICARE

## 2024-09-04 VITALS
TEMPERATURE: 98.1 F | SYSTOLIC BLOOD PRESSURE: 118 MMHG | WEIGHT: 127.4 LBS | HEART RATE: 55 BPM | RESPIRATION RATE: 16 BRPM | HEIGHT: 66 IN | DIASTOLIC BLOOD PRESSURE: 60 MMHG | OXYGEN SATURATION: 100 % | BODY MASS INDEX: 20.48 KG/M2

## 2024-09-04 DIAGNOSIS — I10 ESSENTIAL HYPERTENSION: ICD-10-CM

## 2024-09-04 DIAGNOSIS — J43.9 PULMONARY EMPHYSEMA, UNSPECIFIED EMPHYSEMA TYPE (HCC): ICD-10-CM

## 2024-09-04 DIAGNOSIS — Z72.0 TOBACCO USE: ICD-10-CM

## 2024-09-04 DIAGNOSIS — K13.79 MOUTH PAIN: Primary | ICD-10-CM

## 2024-09-04 DIAGNOSIS — N18.31 STAGE 3A CHRONIC KIDNEY DISEASE (HCC): ICD-10-CM

## 2024-09-04 DIAGNOSIS — E78.00 PURE HYPERCHOLESTEROLEMIA: ICD-10-CM

## 2024-09-04 DIAGNOSIS — E03.9 ACQUIRED HYPOTHYROIDISM: ICD-10-CM

## 2024-09-04 DIAGNOSIS — N28.1 BILATERAL RENAL CYSTS: ICD-10-CM

## 2024-09-04 DIAGNOSIS — R63.4 WEIGHT LOSS: ICD-10-CM

## 2024-09-04 PROCEDURE — G8420 CALC BMI NORM PARAMETERS: HCPCS | Performed by: FAMILY MEDICINE

## 2024-09-04 PROCEDURE — 1123F ACP DISCUSS/DSCN MKR DOCD: CPT | Performed by: FAMILY MEDICINE

## 2024-09-04 PROCEDURE — G8427 DOCREV CUR MEDS BY ELIG CLIN: HCPCS | Performed by: FAMILY MEDICINE

## 2024-09-04 PROCEDURE — 4004F PT TOBACCO SCREEN RCVD TLK: CPT | Performed by: FAMILY MEDICINE

## 2024-09-04 PROCEDURE — 3074F SYST BP LT 130 MM HG: CPT | Performed by: FAMILY MEDICINE

## 2024-09-04 PROCEDURE — 3078F DIAST BP <80 MM HG: CPT | Performed by: FAMILY MEDICINE

## 2024-09-04 PROCEDURE — 3023F SPIROM DOC REV: CPT | Performed by: FAMILY MEDICINE

## 2024-09-04 PROCEDURE — 99214 OFFICE O/P EST MOD 30 MIN: CPT | Performed by: FAMILY MEDICINE

## 2024-09-04 NOTE — TELEPHONE ENCOUNTER
Patient identified with 2 identifiers (name and ).    Spoke with patient and he is aware that the mouth was was e scribe to [employer] [facility] on [facility].

## 2024-09-04 NOTE — PROGRESS NOTES
\"Have you been to the ER, urgent care clinic since your last visit?  Hospitalized since your last visit?\"    NO    “Have you seen or consulted any other health care providers outside of [facility] since your last visit?”    NO            Click Here for Release of Records Request  
(STIOLTO RESPIMAT) 2.5-2.5 MCG/ACT AERS, Inhale 2 puffs into the lungs daily, Disp: 2 each, Rfl: 5    valsartan (DIOVAN) 80 MG tablet, Take 1 tablet by mouth daily, Disp: 90 tablet, Rfl: 1    felodipine (PLENDIL) 10 MG extended release tablet, Take 1 tablet by mouth daily, Disp: 90 tablet, Rfl: 3    levothyroxine (SYNTHROID) 150 MCG tablet, Take 1 tablet by mouth every morning (before breakfast), Disp: 90 tablet, Rfl: 3    sildenafil (VIAGRA) 50 MG tablet, Take 1 tablet by mouth as needed for Erectile Dysfunction, Disp: 30 tablet, Rfl: 1    hydrALAZINE (APRESOLINE) 50 MG tablet, Take 1 tablet by mouth 3 times daily, Disp: 270 tablet, Rfl: 3    lovastatin (MEVACOR) 20 MG tablet, Take 2 tablets by mouth, Disp: , Rfl:     potassium chloride (KLOR-CON M) 20 MEQ extended release tablet, Take 1 tablet by mouth 2 times daily, Disp: , Rfl:     nicotine (NICODERM CQ) 21 MG/24HR, Place 1 patch onto the skin daily (Patient not taking: Reported on 9/4/2024), Disp: 42 patch, Rfl: 0    [START ON 9/26/2024] nicotine (NICODERM CQ) 14 MG/24HR, Place 1 patch onto the skin daily for 14 days (Patient not taking: Reported on 9/4/2024), Disp: 14 patch, Rfl: 0    [START ON 10/11/2024] nicotine (NICODERM CQ) 7 MG/24HR, Place 1 patch onto the skin daily for 14 days (Patient not taking: Reported on 9/4/2024), Disp: 14 patch, Rfl: 0        Allergies   Allergen Reactions    Lisinopril Rash    Norvasc [Amlodipine] Itching       Past Medical History:   Diagnosis Date    Cancer (HCC) 2012    throat    Gout     Hx of colonoscopy with polypectomy 11/15/2016    rpt 5 years 2021, dr. mireles    Hypertension     Pancreatitis 1989    Thyroid disease        Social History     Socioeconomic History    Marital status:      Spouse name: Not on file    Number of children: Not on file    Years of education: Not on file    Highest education level: Not on file   Occupational History    Not on file   Tobacco Use    Smoking status: Every Day     Packs/day:

## 2024-09-04 NOTE — TELEPHONE ENCOUNTER
[facility] pharmacy called and stated that they do not carry the prescription Magic Mouthwash for (Miracle Mouth).

## 2024-09-05 ENCOUNTER — TELEPHONE (OUTPATIENT)
Facility: CLINIC | Age: 79
End: 2024-09-05

## 2024-09-11 RX ORDER — VALSARTAN 80 MG/1
80 TABLET ORAL DAILY
Qty: 90 TABLET | Refills: 1 | Status: SHIPPED | OUTPATIENT
Start: 2024-09-11

## 2024-11-05 ENCOUNTER — PROCEDURE VISIT (OUTPATIENT)
Age: 79
End: 2024-11-05

## 2024-11-05 VITALS — HEIGHT: 66 IN | BODY MASS INDEX: 20.41 KG/M2 | WEIGHT: 127 LBS

## 2024-11-05 DIAGNOSIS — J43.9 PULMONARY EMPHYSEMA, UNSPECIFIED EMPHYSEMA TYPE (HCC): Primary | ICD-10-CM

## 2024-11-08 ENCOUNTER — OFFICE VISIT (OUTPATIENT)
Age: 79
End: 2024-11-08
Payer: MEDICARE

## 2024-11-08 DIAGNOSIS — J44.9 CHRONIC OBSTRUCTIVE PULMONARY DISEASE, UNSPECIFIED COPD TYPE (HCC): Primary | ICD-10-CM

## 2024-11-08 DIAGNOSIS — Z72.0 TOBACCO ABUSE: ICD-10-CM

## 2024-11-08 DIAGNOSIS — J43.2 CENTRILOBULAR EMPHYSEMA (HCC): ICD-10-CM

## 2024-11-08 PROCEDURE — G8484 FLU IMMUNIZE NO ADMIN: HCPCS | Performed by: INTERNAL MEDICINE

## 2024-11-08 PROCEDURE — 99406 BEHAV CHNG SMOKING 3-10 MIN: CPT | Performed by: INTERNAL MEDICINE

## 2024-11-08 PROCEDURE — 99214 OFFICE O/P EST MOD 30 MIN: CPT | Performed by: INTERNAL MEDICINE

## 2024-11-08 PROCEDURE — 1160F RVW MEDS BY RX/DR IN RCRD: CPT | Performed by: INTERNAL MEDICINE

## 2024-11-08 PROCEDURE — G8420 CALC BMI NORM PARAMETERS: HCPCS | Performed by: INTERNAL MEDICINE

## 2024-11-08 PROCEDURE — 1159F MED LIST DOCD IN RCRD: CPT | Performed by: INTERNAL MEDICINE

## 2024-11-08 PROCEDURE — 4004F PT TOBACCO SCREEN RCVD TLK: CPT | Performed by: INTERNAL MEDICINE

## 2024-11-08 PROCEDURE — 3023F SPIROM DOC REV: CPT | Performed by: INTERNAL MEDICINE

## 2024-11-08 PROCEDURE — G8427 DOCREV CUR MEDS BY ELIG CLIN: HCPCS | Performed by: INTERNAL MEDICINE

## 2024-11-08 PROCEDURE — 1123F ACP DISCUSS/DSCN MKR DOCD: CPT | Performed by: INTERNAL MEDICINE

## 2024-11-08 RX ORDER — NICOTINE 21 MG/24HR
1 PATCH, TRANSDERMAL 24 HOURS TRANSDERMAL DAILY
Qty: 14 PATCH | Refills: 1 | Status: SHIPPED | OUTPATIENT
Start: 2024-11-08

## 2024-11-08 RX ORDER — FLUTICASONE PROPIONATE 50 MCG
1 SPRAY, SUSPENSION (ML) NASAL DAILY PRN
Qty: 16 G | Refills: 3 | Status: SHIPPED | OUTPATIENT
Start: 2024-11-08

## 2024-11-08 NOTE — PROGRESS NOTES
Zac Green presents today for   Chief Complaint   Patient presents with    Centrilobular emphysema    Lightheadedness       Is someone accompanying this pt? Wife    Is the patient using any DME equipment during OV? No    -DME Company No    Depression Screening:        3/12/2024     9:42 AM   PHQ-9 Questionaire   Little interest or pleasure in doing things 0   Feeling down, depressed, or hopeless 0   PHQ-9 Total Score 0       Learning Assessment:    Failed to redirect to the Timeline version of the [employer] SmartLink.    Abuse Screening:         No data to display                Fall Risk    Failed to redirect to the Timeline version of the [employer].    Coordination of Care:    1. Have you been to the ER, urgent care clinic since your last visit? Hospitalized since your last visit? No    2. Have you seen or consulted any other health care providers outside of the [facility] since your last visit? Include any pap smears or colon screening. No    Medication list has been update per patient.

## 2024-11-08 NOTE — PATIENT INSTRUCTIONS
What is the plan?  -Stiolto respimat as prescribed - hold for 1 week and re-evaluate dry mouth, if no change then resume Stiolto respimat  -Start on Flonase nasal spray  -Continue to follow-up with Dentist and Ear-Nose-Throat  -Stop use of ALL tobacco products.  -Start on Nicotine patches    -Complete Influenza & updated COVID-19 booster vaccination

## 2024-11-08 NOTE — PROGRESS NOTES
[facility]                                                       Pulmonary, Critical Care, and Sleep Medicine      Pulmonary Office Follow-up.    Name: Zac Kilgore     : 1945     Date: 2024        Subjective:   Chief complaint: Lung nodule  Patient is a 79 y.o. male with a PMHx of Cancer of the throat, Gout, HTN, pancreatitis, and thyroid disease.    HPI(24):  Today in clinic, he states having some lightheadedness.  He states that he has no issues with breathing. No cough,wheezing, chest pain or tightness.  Admits to continued weight loss due to not being able to eat - edentulous. No night sweats.  Notes increased oral secretions and needs to spit it out at night.  Hemoptysis: none.  Tobacco - smoking 2 ciggs/day.  No sinus congestion but admits to intermittent episode of post-nasal drip.    Current inhalers and/or respiratory treatments:  -Stiolto respimat - using 2 puff daily  -Albuterol rescue inhaler - using PRN.      HPI (2024):  Today in clinic, he states that he is here to establish care.  Reports that his ENT told him that his mouth soreness is likely due to his lungs.   Notes that he is always spiting. Notes continued ear ache as well.  Symptoms have been present for the past few months.  Reports weight loss of about 31 Lbs in the past year. Notes decreased appetite due to mouth pain.   Has cut down his alcohol use and has not drank in the past two weeks.  No night sweats, fever, chills, or chest pain.  Notes intermittent wheezing.  Notes prior intermittent dyspnea.  Hemoptysis: none  Denies dysphagia to solids or liquids.    Reports singificant pain in his upper palate, maxillary and frontal sinuses. Worse with eating and drinking water.    H/o Throat cancer - diagnosed 20 years ago; completed RT and Chemo; no surgical intervention.     Current inhalers and/or respiratory treatments:  -none    Prior

## 2024-11-25 ENCOUNTER — APPOINTMENT (OUTPATIENT)
Facility: HOSPITAL | Age: 79
End: 2024-11-25
Payer: MEDICARE

## 2024-11-25 ENCOUNTER — HOSPITAL ENCOUNTER (EMERGENCY)
Facility: HOSPITAL | Age: 79
Discharge: HOME | End: 2024-11-25
Attending: EMERGENCY MEDICINE
Payer: MEDICARE

## 2024-11-25 VITALS
TEMPERATURE: 97.7 F | BODY MASS INDEX: 19.29 KG/M2 | OXYGEN SATURATION: 99 % | HEIGHT: 66 IN | WEIGHT: 120 LBS | RESPIRATION RATE: 17 BRPM | SYSTOLIC BLOOD PRESSURE: 131 MMHG | DIASTOLIC BLOOD PRESSURE: 65 MMHG | HEART RATE: 60 BPM

## 2024-11-25 DIAGNOSIS — K13.79 ORAL PAIN: ICD-10-CM

## 2024-11-25 DIAGNOSIS — R09.81 NASAL CONGESTION: ICD-10-CM

## 2024-11-25 DIAGNOSIS — H66.90 ACUTE OTITIS MEDIA, UNSPECIFIED OTITIS MEDIA TYPE: Primary | ICD-10-CM

## 2024-11-25 PROCEDURE — 70360 X-RAY EXAM OF NECK: CPT

## 2024-11-25 PROCEDURE — 6370000000 HC RX 637 (ALT 250 FOR IP): Performed by: EMERGENCY MEDICINE

## 2024-11-25 PROCEDURE — 99283 EMERGENCY DEPT VISIT LOW MDM: CPT

## 2024-11-25 RX ORDER — GUAIFENESIN 600 MG/1
600 TABLET, EXTENDED RELEASE ORAL 2 TIMES DAILY PRN
Qty: 20 TABLET | Refills: 0 | Status: SHIPPED | OUTPATIENT
Start: 2024-11-25 | End: 2024-12-02

## 2024-11-25 RX ORDER — AMOXICILLIN 500 MG/1
500 CAPSULE ORAL 3 TIMES DAILY
Qty: 21 CAPSULE | Refills: 0 | Status: SHIPPED | OUTPATIENT
Start: 2024-11-25 | End: 2024-12-02

## 2024-11-25 RX ORDER — TRAMADOL HYDROCHLORIDE 50 MG/1
50 TABLET ORAL
Status: COMPLETED | OUTPATIENT
Start: 2024-11-25 | End: 2024-11-25

## 2024-11-25 RX ORDER — AMOXICILLIN 250 MG/1
500 CAPSULE ORAL
Status: COMPLETED | OUTPATIENT
Start: 2024-11-25 | End: 2024-11-25

## 2024-11-25 RX ADMIN — AMOXICILLIN 500 MG: 250 CAPSULE ORAL at 11:36

## 2024-11-25 RX ADMIN — TRAMADOL HYDROCHLORIDE 50 MG: 50 TABLET ORAL at 11:36

## 2024-11-25 ASSESSMENT — PAIN - FUNCTIONAL ASSESSMENT
PAIN_FUNCTIONAL_ASSESSMENT: 0-10
PAIN_FUNCTIONAL_ASSESSMENT: ACTIVITIES ARE NOT PREVENTED

## 2024-11-25 ASSESSMENT — PAIN DESCRIPTION - LOCATION: LOCATION: MOUTH

## 2024-11-25 ASSESSMENT — PAIN SCALES - GENERAL
PAINLEVEL_OUTOF10: 6
PAINLEVEL_OUTOF10: 8

## 2024-11-25 ASSESSMENT — PAIN DESCRIPTION - DESCRIPTORS: DESCRIPTORS: THROBBING

## 2024-11-25 NOTE — ED TRIAGE NOTES
Ambulatory to [facility] with c/o pain and swelling to roof of mouth > 1 year with thick mucous production and difficulty swallowing. States has been to 6 different Dr's including 2 different ENT's and has had multiple xrays, CT scans, lab work etc and they can't find anything wrong. Has had dentures 30+ years. States 50# weight loss in past year.

## 2024-11-25 NOTE — ED PROVIDER NOTES
[facility] EMERGENCY DEPT  EMERGENCY DEPARTMENT ENCOUNTER      Pt Name: Zac Kilgore  MRN: [id number]  Birthdate 1945  Date of evaluation: 11/25/2024  Provider: Marlon Macario MD    CHIEF COMPLAINT       Chief Complaint   Patient presents with    Oral Swelling       HPI:  Zac Kilgore is a 79 y.o. male who presents to the emergency department pt c/o  nasal congestion, pain on roof of mouth., c/o thick post nasal drip, wt loss, rt ear pain.  Seen by ent mult times, had ct, neg w/up. Sx's cont x months.     HPI    Nursing Notes were reviewed.    REVIEW OF SYSTEMS    (2-9 systems for level 4, 10 or more for level 5)     Review of Systems    Except as noted above the remainder of the review of systems was reviewed and negative.       PAST MEDICAL HISTORY     Past Medical History:   Diagnosis Date    Cancer (HCC) 2012    throat    Gout     Hx of colonoscopy with polypectomy 11/15/2016    rpt 5 years 2021, dr. mireles    Hypertension     Pancreatitis 1989    Thyroid disease          SURGICAL HISTORY       Past Surgical History:   Procedure Laterality Date    COLONOSCOPY  11/15/2016    repeat 5 years 2021, Dr Oni Mireles polyp    [place] UNLISTED PROCEDURE ABDOMEN PERITONEUM & OMENTUM           CURRENT MEDICATIONS       Previous Medications    CARVEDILOL (COREG) 25 MG TABLET    Take 1 tablet by mouth with breakfast and with evening meal    FELODIPINE (PLENDIL) 10 MG EXTENDED RELEASE TABLET    Take 1 tablet by mouth daily    FLUTICASONE (FLONASE) 50 MCG/ACT NASAL SPRAY    1 spray by Each Nostril route daily as needed for Rhinitis    HYDRALAZINE (APRESOLINE) 50 MG TABLET    Take 1 tablet by mouth 3 times daily    LEVOTHYROXINE (SYNTHROID) 150 MCG TABLET    Take 1 tablet by mouth every morning (before breakfast)    LOVASTATIN (MEVACOR) 20 MG TABLET    Take 2 tablets by mouth    MAGIC MOUTHWASH (MIRACLE MOUTHWASH)    Swish and spit 5 mLs 4 times daily as needed for Irritation    NICOTINE (NICODERM CQ) 14 MG/24HR    Place 1

## 2024-12-05 ENCOUNTER — HOSPITAL ENCOUNTER (OUTPATIENT)
Facility: HOSPITAL | Age: 79
Discharge: HOME | End: 2024-12-08
Attending: FAMILY MEDICINE
Payer: MEDICARE

## 2024-12-05 ENCOUNTER — HOSPITAL ENCOUNTER (OUTPATIENT)
Facility: HOSPITAL | Age: 79
Discharge: HOME | End: 2024-12-08
Payer: MEDICARE

## 2024-12-05 DIAGNOSIS — E78.00 PURE HYPERCHOLESTEROLEMIA: ICD-10-CM

## 2024-12-05 DIAGNOSIS — N28.1 BILATERAL RENAL CYSTS: ICD-10-CM

## 2024-12-05 DIAGNOSIS — I10 ESSENTIAL HYPERTENSION: ICD-10-CM

## 2024-12-05 DIAGNOSIS — E03.9 ACQUIRED HYPOTHYROIDISM: ICD-10-CM

## 2024-12-05 LAB
ALBUMIN SERPL-MCNC: 3.3 G/DL (ref 3.4–5)
ALBUMIN/GLOB SERPL: 0.9 (ref 0.8–1.7)
ALP SERPL-CCNC: 76 U/L (ref 45–117)
ALT SERPL-CCNC: 14 U/L (ref 16–61)
ANION GAP SERPL CALC-SCNC: 6 MMOL/L (ref 3–18)
AST SERPL-CCNC: 13 U/L (ref 10–38)
BILIRUB SERPL-MCNC: 0.4 MG/DL (ref 0.2–1)
BUN SERPL-MCNC: 26 MG/DL (ref 7–18)
BUN/CREAT SERPL: 18 (ref 12–20)
CALCIUM SERPL-MCNC: 9.4 MG/DL (ref 8.5–10.1)
CHLORIDE SERPL-SCNC: 106 MMOL/L (ref 100–111)
CHOLEST SERPL-MCNC: 129 MG/DL
CO2 SERPL-SCNC: 25 MMOL/L (ref 21–32)
CREAT SERPL-MCNC: 1.47 MG/DL (ref 0.6–1.3)
GLOBULIN SER CALC-MCNC: 3.8 G/DL (ref 2–4)
GLUCOSE SERPL-MCNC: 101 MG/DL (ref 74–99)
HDLC SERPL-MCNC: 59 MG/DL (ref 40–60)
HDLC SERPL: 2.2 (ref 0–5)
LDLC SERPL CALC-MCNC: 58.2 MG/DL (ref 0–100)
LIPID PANEL: NORMAL
POTASSIUM SERPL-SCNC: 5.2 MMOL/L (ref 3.5–5.5)
PROT SERPL-MCNC: 7.1 G/DL (ref 6.4–8.2)
SODIUM SERPL-SCNC: 137 MMOL/L (ref 136–145)
TRIGL SERPL-MCNC: 59 MG/DL
TSH SERPL DL<=0.05 MIU/L-ACNC: 0.07 UIU/ML (ref 0.36–3.74)
VLDLC SERPL CALC-MCNC: 11.8 MG/DL

## 2024-12-05 PROCEDURE — 76770 US EXAM ABDO BACK WALL COMP: CPT

## 2024-12-05 PROCEDURE — 84443 ASSAY THYROID STIM HORMONE: CPT

## 2024-12-05 PROCEDURE — 80053 COMPREHEN METABOLIC PANEL: CPT

## 2024-12-05 PROCEDURE — 36415 COLL VENOUS BLD VENIPUNCTURE: CPT

## 2024-12-05 PROCEDURE — 80061 LIPID PANEL: CPT

## 2024-12-12 ENCOUNTER — TELEPHONE (OUTPATIENT)
Dept: PHARMACY | Facility: CLINIC | Age: 79
End: 2024-12-12

## 2024-12-12 ENCOUNTER — OFFICE VISIT (OUTPATIENT)
Facility: CLINIC | Age: 79
End: 2024-12-12

## 2024-12-12 VITALS
TEMPERATURE: 97.5 F | RESPIRATION RATE: 16 BRPM | SYSTOLIC BLOOD PRESSURE: 104 MMHG | DIASTOLIC BLOOD PRESSURE: 60 MMHG | WEIGHT: 115.8 LBS | HEIGHT: 66 IN | BODY MASS INDEX: 18.61 KG/M2 | OXYGEN SATURATION: 99 % | HEART RATE: 50 BPM

## 2024-12-12 DIAGNOSIS — K13.79 MOUTH PAIN: ICD-10-CM

## 2024-12-12 DIAGNOSIS — R63.4 WEIGHT LOSS: ICD-10-CM

## 2024-12-12 DIAGNOSIS — Z87.891 FORMER SMOKER: ICD-10-CM

## 2024-12-12 DIAGNOSIS — N18.31 STAGE 3A CHRONIC KIDNEY DISEASE (HCC): ICD-10-CM

## 2024-12-12 DIAGNOSIS — E03.9 ACQUIRED HYPOTHYROIDISM: ICD-10-CM

## 2024-12-12 DIAGNOSIS — J43.9 PULMONARY EMPHYSEMA, UNSPECIFIED EMPHYSEMA TYPE (HCC): ICD-10-CM

## 2024-12-12 DIAGNOSIS — I10 ESSENTIAL HYPERTENSION: ICD-10-CM

## 2024-12-12 DIAGNOSIS — Z00.00 MEDICARE ANNUAL WELLNESS VISIT, SUBSEQUENT: Primary | ICD-10-CM

## 2024-12-12 DIAGNOSIS — E78.00 PURE HYPERCHOLESTEROLEMIA: ICD-10-CM

## 2024-12-12 DIAGNOSIS — N28.89 RIGHT KIDNEY MASS: ICD-10-CM

## 2024-12-12 RX ORDER — GABAPENTIN 100 MG/1
CAPSULE ORAL
COMMUNITY
Start: 2024-12-03 | End: 2025-03-03

## 2024-12-12 RX ORDER — LEVOTHYROXINE SODIUM 125 UG/1
125 TABLET ORAL DAILY
Qty: 60 TABLET | Refills: 0 | Status: SHIPPED | OUTPATIENT
Start: 2024-12-12

## 2024-12-12 ASSESSMENT — PATIENT HEALTH QUESTIONNAIRE - PHQ9
2. FEELING DOWN, DEPRESSED OR HOPELESS: NOT AT ALL
1. LITTLE INTEREST OR PLEASURE IN DOING THINGS: NOT AT ALL
SUM OF ALL RESPONSES TO PHQ QUESTIONS 1-9: 0
SUM OF ALL RESPONSES TO PHQ QUESTIONS 1-9: 0
SUM OF ALL RESPONSES TO PHQ9 QUESTIONS 1 & 2: 0
SUM OF ALL RESPONSES TO PHQ QUESTIONS 1-9: 0
SUM OF ALL RESPONSES TO PHQ QUESTIONS 1-9: 0

## 2024-12-12 ASSESSMENT — LIFESTYLE VARIABLES
HOW MANY STANDARD DRINKS CONTAINING ALCOHOL DO YOU HAVE ON A TYPICAL DAY: PATIENT DOES NOT DRINK
HOW OFTEN DO YOU HAVE A DRINK CONTAINING ALCOHOL: NEVER

## 2024-12-12 NOTE — PROGRESS NOTES
\"Have you been to the ER, urgent care clinic since your last visit?  Hospitalized since your last visit?\"    YES - When: approximately 2  weeks ago.  Where and Why: [facility]bv ed oral swelling .    “Have you seen or consulted any other health care providers outside our system since your last visit?”    NO           
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 Rdz ENT was also referred to neurology Dr. Reich    Follow-up in 2 months for CT review and blood pressure recheck bring BP log

## 2024-12-12 NOTE — TELEPHONE ENCOUNTER
Reason for referral: Comprehensive Medication Review  Referring provider: Dr Turner  Referring provider office:  [facility]  Referred to: Jairo Peguero, PharmD, BCACP, BC-ADM  Status of Patient: New Patient  Length of Appt: 60 minutes  Type of Appt: In Person   Patient need address: [address] [place], [place] [address]

## 2024-12-12 NOTE — TELEPHONE ENCOUNTER
**[facility]**    Attempt made to contact patient at the mobile number.    Unable to LVM    Carla Hinton [facility]   Ambulatory Pharmacy Clinical   [phone]  Department, toll free: [phone], option 2

## 2024-12-12 NOTE — PATIENT INSTRUCTIONS
deductible, co-insurance, and/or copay.    Some of these benefits include a comprehensive review of your medical history including lifestyle, illnesses that may run in your family, and various assessments and screenings as appropriate.    After reviewing your medical record and screening and assessments performed today your provider may have ordered immunizations, labs, imaging, and/or referrals for you.  A list of these orders (if applicable) as well as your Preventive Care list are included within your After Visit Summary for your review.    Other Preventive Recommendations:    A preventive eye exam performed by an eye specialist is recommended every 1-2 years to screen for glaucoma; cataracts, macular degeneration, and other eye disorders.  A preventive dental visit is recommended every 6 months.  Try to get at least 150 minutes of exercise per week or 10,000 steps per day on a pedometer .  Order or download the FREE \"Exercise & Physical Activity: Your Everyday Guide\" from [employer]. Call 1-890.392.5745 or search [employer] online.  You need 5213-6251 mg of calcium and 1705-7642 IU of vitamin D per day. It is possible to meet your calcium requirement with diet alone, but a vitamin D supplement is usually necessary to meet this goal.  When exposed to the sun, use a sunscreen that protects against both UVA and UVB radiation with an SPF of 30 or greater. Reapply every 2 to 3 hours or after sweating, drying off with a towel, or swimming.  Always wear a seat belt when traveling in a car. Always wear a helmet when riding a bicycle or motorcycle.

## 2024-12-13 NOTE — TELEPHONE ENCOUNTER
**[facility]**     Second Attempt made to contact patient at the mobile number.     Unable to LVM     Carla Hinton [facility]   Ambulatory Pharmacy Clinical   [phone]  Department, toll free: [phone], option 2

## 2024-12-16 NOTE — TELEPHONE ENCOUNTER
**[facility] is to [facility] with [facility]**     Third Attempt made to contact patient at the mobile number.     Unable to LVM    *Letter Sent*     Carla Hinton [facility]   Ambulatory Pharmacy Clinical   [phone]  Department, toll free: [phone], option 2     For Pharmacy Admin Tracking Only    Program: Medical Group  Gap Closed?: No   Time Spent (min): 5

## 2025-01-07 ENCOUNTER — TELEPHONE (OUTPATIENT)
Age: 80
End: 2025-01-07

## 2025-01-07 NOTE — TELEPHONE ENCOUNTER
Received a message from JUAN Estrada to schedule the patient with Dr. Villegas following a hospitalization with abn ECHO

## 2025-04-14 RX ORDER — VALSARTAN 80 MG/1
80 TABLET ORAL DAILY
Qty: 90 TABLET | Refills: 1 | OUTPATIENT
Start: 2025-04-14

## 2025-04-15 NOTE — TELEPHONE ENCOUNTER
This pharmacy faxed over request for the following prescriptions to be filled:    Medication requested : Valsartan 80MG  PCP: Dr. Turner   Pharmacy or Print:  [facility]   Mail order or Local pharmacy [facility]     Scheduled appointment if not seen by current providers in office: [place]  12/12/2024, F/u    no

## 2025-04-16 RX ORDER — VALSARTAN 80 MG/1
80 TABLET ORAL DAILY
Qty: 90 TABLET | Refills: 1 | Status: SHIPPED | OUTPATIENT
Start: 2025-04-16

## 2025-04-24 RX ORDER — VALSARTAN 80 MG/1
80 TABLET ORAL DAILY
Qty: 90 TABLET | Refills: 1 | OUTPATIENT
Start: 2025-04-24

## 2025-04-24 NOTE — TELEPHONE ENCOUNTER
Pt wife states that she and her  \"no longer see her or care to.\"   Will remove PCP from patient and wife chart